# Patient Record
Sex: FEMALE | Race: WHITE | NOT HISPANIC OR LATINO | Employment: OTHER | URBAN - METROPOLITAN AREA
[De-identification: names, ages, dates, MRNs, and addresses within clinical notes are randomized per-mention and may not be internally consistent; named-entity substitution may affect disease eponyms.]

---

## 2017-01-18 ENCOUNTER — ALLSCRIPTS OFFICE VISIT (OUTPATIENT)
Dept: OTHER | Facility: OTHER | Age: 82
End: 2017-01-18

## 2017-09-11 ENCOUNTER — ALLSCRIPTS OFFICE VISIT (OUTPATIENT)
Dept: OTHER | Facility: OTHER | Age: 82
End: 2017-09-11

## 2017-09-11 DIAGNOSIS — E05.90 THYROTOXICOSIS WITHOUT THYROID STORM: ICD-10-CM

## 2017-09-11 DIAGNOSIS — E78.5 HYPERLIPIDEMIA: ICD-10-CM

## 2017-09-11 DIAGNOSIS — I10 ESSENTIAL (PRIMARY) HYPERTENSION: ICD-10-CM

## 2017-11-13 ENCOUNTER — ALLSCRIPTS OFFICE VISIT (OUTPATIENT)
Dept: OTHER | Facility: OTHER | Age: 82
End: 2017-11-13

## 2017-11-13 DIAGNOSIS — Z12.31 ENCOUNTER FOR SCREENING MAMMOGRAM FOR MALIGNANT NEOPLASM OF BREAST: ICD-10-CM

## 2018-01-13 VITALS
HEIGHT: 59 IN | SYSTOLIC BLOOD PRESSURE: 138 MMHG | BODY MASS INDEX: 20.99 KG/M2 | WEIGHT: 104.13 LBS | DIASTOLIC BLOOD PRESSURE: 70 MMHG

## 2018-01-14 VITALS
DIASTOLIC BLOOD PRESSURE: 70 MMHG | HEIGHT: 59 IN | SYSTOLIC BLOOD PRESSURE: 128 MMHG | RESPIRATION RATE: 16 BRPM | WEIGHT: 108 LBS | BODY MASS INDEX: 21.77 KG/M2 | HEART RATE: 74 BPM | TEMPERATURE: 98 F

## 2018-01-18 NOTE — PROGRESS NOTES
Assessment    1  Encounter for preventive health examination (V70 0) (Z00 00)    Plan  Encounter for screening mammogram for breast cancer    · * MAMMO SCREENING BILATERAL W CAD; Status:Hold For - Scheduling; Requested  for:13Nov2017;   PMH: History of polymyalgia rheumatica    · TraMADol HCl - 50 MG Oral Tablet; TAKE 1 TABLET 3 TIMES DAILY AS NEEDED    Discussion/Summary    Vitamin D 4000 units/day, tetanus shot at pharmacy  Impression: Initial Annual Wellness Visit, with preventive exam as well as age and risk appropriate counseling completed  Cardiovascular screening and counseling: screening is current  Diabetes screening and counseling: screening is current  Colorectal cancer screening and counseling: screening not indicated  Breast cancer screening and counseling: due for a screening mammogram    Cervical cancer screening and counseling: screening not indicated  Osteoporosis screening and counseling: screening not indicated  Abdominal aortic aneurysm screening and counseling: screening not indicated  Glaucoma screening and counseling: screening is current  HIV screening and counseling: screening not indicated  Immunizations: the patient declines the influenza vaccination, the lifetime pneumococcal vaccine has been completed, the risks and benefits of the Zostavax vaccine were discussed with the patient and the risks and benefits of the Tdap vaccine were discussed with the patient  Possible side effects of new medications were reviewed with the patient/guardian today  The treatment plan was reviewed with the patient/guardian  The patient/guardian understands and agrees with the treatment plan      Chief Complaint  Pt here for AWV      Advance Directives  Advance Directive [facility]:   YES - Patient has an advance health care directive     Durable Power of  For Healthcare:    Name: Aamda Fu   Relationship:    Alternate Health Care Proxy:    Name: Amada Fu   Relationship: son Capacity/Competence: This patient has full decision making capacity for discussion of advance care planning and This patient has full decision making competency for discussion of advance care planning  History of Present Illness  Welcome to Medicare and Wellness Visits: The patient is being seen for the initial annual wellness visit  Medicare Screening and Risk Factors   Hospitalizations: no previous hospitalizations and no hospitalization  Once per lifetime medicare screening tests: ECG has not been done and AAA screening US has not yet been done  Medicare Screening Tests Risk Questions   Abdominal aortic aneurysm risk assessment: none indicated  Osteoporosis risk assessment: , female gender and over 48years of age  HIV risk assessment: none indicated  Drug and Alcohol Use: The patient is a former cigarette smoker and quit smoking 30 years ago  The patient reports never drinking alcohol  Alcohol concern:   The patient has no concerns about alcohol abuse  She has never used illicit drugs  Diet and Physical Activity: Current diet includes well balanced meals  She exercises daily  Exercise: walking 30 minutes per day  Mood Disorder and Cognitive Impairment Screening: PHQ-9 Depression Scale   Over the past 2 weeks, how often have you been bothered by the following problems? 1 ) Little interest or pleasure in doing things? Not at all    2 ) Feeling down, depressed or hopeless? Not at all    3 ) Trouble falling asleep or sleeping too much? Not at all    4 ) Feeling tired or having little energy? Several days  5 ) Poor appetite or overeating? Not at all    6 ) Feeling bad about yourself, or that you are a failure, or have let yourself or your family down? Not at all    7 ) Trouble concentrating on things, such as reading a newspaper or watching television?  Not at all    8 ) Moving or speaking so slowly that other people could have noticed, or the opposite, moving or speaking faster than usual? Not at all  How difficult have these problems made it for you to do your work, take care of things at home, or get along with people? Not at all  Cognitive impairment screening: denies difficulty learning/retaining new information, denies difficulty handling complex tasks, denies difficulty with reasoning, denies difficulty with spatial ability and orientation, denies difficulty with language and denies difficulty with behavior  Functional Ability/Level of Safety: Hearing is normal bilaterally, normal in the right ear and normal in the left ear  She does not use a hearing aid  The patient is currently able to do activities of daily living with limitations, able to do instrumental activities of daily living with limitations, able to participate in social activities with limitations and unable to drive  Activities of daily living details: transportation help needed, needs help shopping, meal preparation help needed and needs help doing housework, but does not need help using the phone, does not need help doing laundry, does not need help managing medications and does not need help managing money  Fall risk factors: The patient fell 1 times in the past 12 months  tripped over walker  Injury History: previous fall  Home safety risk factors:  no unfamiliar surroundings, no loose rugs, no poor household lighting, no uneven floors, no household clutter, grab bars in the bathroom and handrails on the stairs  Advance Directives: Advance directives: living will, but no durable power of  for health care directives and no advance directives  Co-Managers and Medical Equipment/Suppliers: See Patient Care Team      Review of Systems    Constitutional: no malaise and no fatigue  Eyes: visual loss  ENT: no earache and no sore throat  Cardiovascular: no chest pain  Respiratory: no cough  Musculoskeletal: diffuse joint pain and back pain  Active Problems    1   Anxiety (300 00) (F41 9) 2  Arthritis (716 90) (M19 90)   3  Asymptomatic postmenopausal state (V49 81) (Z78 0)   4  Cancer (199 1) (C80 1)   5  Flu vaccine need (V04 81) (Z23)   6  Hyperlipidemia (272 4) (E78 5)   7  Hypertension (401 9) (I10)   8  Hyperthyroidism (242 90) (E05 90)   9  Insomnia (780 52) (G47 00)   10  Meningioma (225 2) (D32 9)   11  Need for Tdap vaccination (V06 1) (Z23)   12  Need for Zostavax administration (V04 89) (Z23)   13  Screening for genitourinary condition (V81 6) (Z13 89)    Past Medical History    · Denied: History of alcohol abuse   · Denied: History of mental problems   · Denied: History of substance abuse    The active problems and past medical history were reviewed and updated today  Surgical History    · History of Abdominal / Peritoneal Surgery   · History of Appendectomy   · History of Hysterectomy    The surgical history was reviewed and updated today  Family History  Father    · Family history of cardiac disorder (V17 49) (Z82 49)   · Family history of pulmonary embolism (V17 49) (Z82 49)  Sister    · Family history of dementia (V17 2) (Z81 8)  Other    · Family history of lung cancer (V16 1) (Z80 1)  Family History    · Denied: Family history of alcohol abuse   · Denied: Family history of mental disorder   · Denied: Family history of substance abuse    The family history was reviewed and updated today         Social History    · Active advance directive (V49 89) (Z78 9)   · Advance directive information unavailable   · Always uses seat belt   · Apartment   · lives in apartment as part of daughter's house   · Former smoker (I02 13) (Q40 357)   · Denied: History of domestic violence   ·    · No alcohol use   · Patient has living will (V49 89) (Z78 9)   · Power of  in existence   · Primary language is English   · Denied: History of Problem with [employer] beliefs regarding medical care   · Retired   · [employer]   · Smoking for 15 years   · Supportive and safe   · Two children  The social history was reviewed and updated today  Current Meds   1  Aspirin 81 MG TABS; Take 1 tablet daily Recorded   2  ClonazePAM 1 MG Oral Tablet; TAKE 1 TABLET 3 TIMES DAILY; Last Rx:03Oct2017   Ordered   3  Rozerem 8 MG Oral Tablet; TAKE 1 TABLET AT BEDTIME; Therapy: [id number] to (Evaluate:18Apr2017); Last Rx:18Jan2017 Ordered   4  TraMADol HCl - 50 MG Oral Tablet; TAKE 1 TABLET 3 TIMES DAILY AS NEEDED; Therapy: [id number] to (Evaluate:11Aug2017)  Requested for: 12Jun2017; Last   Rx:12Jun2017 Ordered   5  Triamterene-HCTZ 37 5-25 MG Oral Capsule; Take one capsule daily; Therapy: 16SXH9704 to (Evaluate:18May2017)  Requested for: 55RWG2710; Last   Rx:18Jan2017 Ordered   6  Triamterene-HCTZ 37 5-25 MG Oral Tablet; TAKE 1 TABLET DAILY; Therapy: 73YXT7546 to (Evaluate:27Jun2017)  Requested for: 12Jun2017; Last   Rx:12Jun2017 Ordered   7  Zetia 10 MG Oral Tablet; 1 po q day  Requested for: 21NVF1901; Last Rx:18Jan2017   Ordered    The medication list was reviewed and updated today  Allergies    1  No Known Drug Allergies    Immunizations   ** Printed in Appendix #1 below  Vitals  Signs    Temperature: 97 7 F, Temporal  Heart Rate: 72  Respiration: 14  Systolic: 363  Diastolic: 74  Height: 4 ft 11 in  Weight: 106 lb 4 oz  BMI Calculated: 21 46  BSA Calculated: 1 41    Physical Exam    Constitutional   General appearance: No acute distress, well appearing and well nourished  Ears, Nose, Mouth, and Throat   Otoscopic examination: Tympanic membranes translucent with normal light reflex  Canals patent without erythema  Oropharynx: Normal with no erythema, edema, exudate or lesions  Neck   Thyroid: Normal, no thyromegaly  Pulmonary   Auscultation of lungs: Clear to auscultation  Cardiovascular   Auscultation of heart: Normal rate and rhythm, normal S1 and S2, no murmurs  Abdomen   Abdomen: Non-tender, no masses      Lymphatic   Palpation of lymph nodes in neck: No lymphadenopathy  Musculoskeletal   Joints, bones, and muscles: Abnormal   Appearance - swelling  Palpation - lower mid-lumbar tenderness  Future Appointments    Date/Time Provider Specialty Site   2018 10:30 AM Xuan Horn MD [facility] [place] [address]     Signatures   Electronically signed by :  Conner Delgado MD; 2017 11:40AM EST                       (Author)    Appendix #1     Patient: Manuel Pedraza ; : 1933; MRN: [id number]      1 2 3 4    Influenza  Temporarily Deferred: Pt refuses, As of: [id number], Defer for 1 Years -2013  (08H) -2014  (15I) 2015  (79S)    Pneumo Other  Oct 2002  (68y) 26-Mar-2014  (80y)      Tdap  Temporarily Deferred: Pt refuses, NOT COVERED BY INSURANCE, As of: [id number], Defer for 2 Years       Zoster  Temporarily Deferred: Pt requests deferral, As of: [id number], Defer for 2 Years

## 2018-01-22 VITALS
TEMPERATURE: 97.7 F | RESPIRATION RATE: 14 BRPM | WEIGHT: 106.25 LBS | HEART RATE: 72 BPM | DIASTOLIC BLOOD PRESSURE: 74 MMHG | HEIGHT: 59 IN | BODY MASS INDEX: 21.42 KG/M2 | SYSTOLIC BLOOD PRESSURE: 122 MMHG

## 2018-02-19 ENCOUNTER — TELEPHONE (OUTPATIENT)
Dept: FAMILY MEDICINE CLINIC | Facility: CLINIC | Age: 83
End: 2018-02-19

## 2018-02-19 DIAGNOSIS — F41.9 ANXIETY: ICD-10-CM

## 2018-02-19 DIAGNOSIS — M19.90 ARTHRITIS: Primary | ICD-10-CM

## 2018-02-19 RX ORDER — TRAMADOL HYDROCHLORIDE 50 MG/1
1 TABLET ORAL 3 TIMES DAILY PRN
COMMUNITY
Start: 2014-07-08 | End: 2018-02-19 | Stop reason: SDUPTHER

## 2018-02-19 RX ORDER — EZETIMIBE 10 MG/1
TABLET ORAL DAILY
COMMUNITY
End: 2018-10-01 | Stop reason: SDUPTHER

## 2018-02-19 RX ORDER — CLONAZEPAM 1 MG/1
1 TABLET ORAL 3 TIMES DAILY
COMMUNITY
End: 2018-02-19 | Stop reason: SDUPTHER

## 2018-02-19 RX ORDER — TRIAMTERENE AND HYDROCHLOROTHIAZIDE 37.5; 25 MG/1; MG/1
1 CAPSULE ORAL DAILY
COMMUNITY
Start: 2014-08-11 | End: 2018-10-01 | Stop reason: SDUPTHER

## 2018-02-19 RX ORDER — TRAMADOL HYDROCHLORIDE 50 MG/1
50 TABLET ORAL 3 TIMES DAILY PRN
Qty: 90 TABLET | Refills: 0 | Status: SHIPPED | OUTPATIENT
Start: 2018-02-19 | End: 2018-04-11 | Stop reason: SDUPTHER

## 2018-02-19 RX ORDER — CLONAZEPAM 1 MG/1
1 TABLET ORAL 3 TIMES DAILY
Qty: 90 TABLET | Refills: 2 | Status: SHIPPED | OUTPATIENT
Start: 2018-02-19 | End: 2018-07-06 | Stop reason: SDUPTHER

## 2018-02-19 NOTE — TELEPHONE ENCOUNTER
Gina Ndiaye called in for wife  She needs her tramadol and her clonazepam  Only problem they do not have transportation to get to office  I will have to speak w/ rosana to see if we can call this in for patient(tramadol) and fax over clonazepam to [facility]  Call once sent or ready for

## 2018-02-19 NOTE — TELEPHONE ENCOUNTER
Justino Gore gave the okay this one time for patient's tramadol to be called into pharmacy and for clonazepam to be faxed to [facility]'s pharmacy  Please make patient aware that any future medications will need to be picked up  Thank you

## 2018-03-12 ENCOUNTER — TELEPHONE (OUTPATIENT)
Dept: FAMILY MEDICINE CLINIC | Facility: CLINIC | Age: 83
End: 2018-03-12

## 2018-03-12 NOTE — TELEPHONE ENCOUNTER
Wants to know when controlled substances can be sent to pharmacy and  feeling sick so cancelling appt-told her network working on e-prescribing of controlled substances

## 2018-04-11 DIAGNOSIS — M19.90 ARTHRITIS: ICD-10-CM

## 2018-04-11 RX ORDER — TRAMADOL HYDROCHLORIDE 50 MG/1
50 TABLET ORAL 3 TIMES DAILY PRN
Qty: 90 TABLET | Refills: 0 | Status: SHIPPED | OUTPATIENT
Start: 2018-04-11 | End: 2018-05-14 | Stop reason: SDUPTHER

## 2018-05-14 DIAGNOSIS — M19.90 ARTHRITIS: ICD-10-CM

## 2018-05-14 RX ORDER — TRAMADOL HYDROCHLORIDE 50 MG/1
50 TABLET ORAL 3 TIMES DAILY PRN
Qty: 90 TABLET | Refills: 0 | Status: SHIPPED | OUTPATIENT
Start: 2018-05-14 | End: 2018-06-14 | Stop reason: SDUPTHER

## 2018-05-24 ENCOUNTER — OFFICE VISIT (OUTPATIENT)
Dept: FAMILY MEDICINE CLINIC | Facility: CLINIC | Age: 83
End: 2018-05-24
Payer: COMMERCIAL

## 2018-05-24 VITALS
HEART RATE: 74 BPM | HEIGHT: 59 IN | RESPIRATION RATE: 14 BRPM | BODY MASS INDEX: 22.05 KG/M2 | WEIGHT: 109.38 LBS | SYSTOLIC BLOOD PRESSURE: 112 MMHG | DIASTOLIC BLOOD PRESSURE: 70 MMHG | TEMPERATURE: 98.3 F

## 2018-05-24 DIAGNOSIS — E78.5 HYPERLIPIDEMIA, UNSPECIFIED HYPERLIPIDEMIA TYPE: Primary | ICD-10-CM

## 2018-05-24 DIAGNOSIS — E05.90 HYPERTHYROIDISM: ICD-10-CM

## 2018-05-24 DIAGNOSIS — F41.9 ANXIETY: ICD-10-CM

## 2018-05-24 DIAGNOSIS — I10 ESSENTIAL HYPERTENSION: ICD-10-CM

## 2018-05-24 PROCEDURE — 99214 OFFICE O/P EST MOD 30 MIN: CPT | Performed by: FAMILY MEDICINE

## 2018-05-24 PROCEDURE — 1101F PT FALLS ASSESS-DOCD LE1/YR: CPT | Performed by: FAMILY MEDICINE

## 2018-05-24 PROCEDURE — 3725F SCREEN DEPRESSION PERFORMED: CPT | Performed by: FAMILY MEDICINE

## 2018-05-24 NOTE — PROGRESS NOTES
Assessment/Plan:    No problem-specific Assessment & Plan notes found for this encounter  Diagnoses and all orders for this visit:    Hyperlipidemia, unspecified hyperlipidemia type    Anxiety    Essential hypertension          Subjective:      Patient ID: Tramaine Velazquez is a 80 y o  female  Hypertension   This is a chronic problem  The current episode started more than 1 year ago  The problem is unchanged  The problem is controlled  Associated symptoms include anxiety  Pertinent negatives include no chest pain, headaches, palpitations, peripheral edema or shortness of breath  There are no associated agents to hypertension  Past treatments include diuretics  The current treatment provides significant improvement  There are no compliance problems  The following portions of the patient's history were reviewed and updated as appropriate: allergies, current medications, past family history, past medical history, past social history, past surgical history and problem list     Review of Systems   Constitutional: Negative for chills, fatigue and unexpected weight change  Respiratory: Negative for shortness of breath  Cardiovascular: Negative for chest pain and palpitations  Musculoskeletal: Positive for arthralgias  Neurological: Negative for headaches  Psychiatric/Behavioral: The patient is nervous/anxious  Objective:      /70 (BP Location: Left arm, Patient Position: Sitting, Cuff Size: Adult)   Pulse 74   Temp 98 3 °F (36 8 °C) (Temporal)   Resp 14   Ht 4' 11 01" (1 499 m)   Wt 49 6 kg (109 lb 6 oz)   BMI 22 08 kg/m²          Physical Exam   Constitutional: She appears well-developed and well-nourished  Neck: No thyromegaly present  Cardiovascular: Normal rate, regular rhythm and normal heart sounds  Pulmonary/Chest: Breath sounds normal    Musculoskeletal: She exhibits no edema  Lymphadenopathy:     She has no cervical adenopathy  Vitals reviewed

## 2018-06-14 DIAGNOSIS — M19.90 ARTHRITIS: ICD-10-CM

## 2018-06-14 RX ORDER — TRAMADOL HYDROCHLORIDE 50 MG/1
50 TABLET ORAL 3 TIMES DAILY PRN
Qty: 90 TABLET | Refills: 0 | Status: SHIPPED | OUTPATIENT
Start: 2018-06-14 | End: 2018-07-11 | Stop reason: SDUPTHER

## 2018-06-15 ENCOUNTER — TELEPHONE (OUTPATIENT)
Dept: FAMILY MEDICINE CLINIC | Facility: CLINIC | Age: 83
End: 2018-06-15

## 2018-06-22 ENCOUNTER — TELEPHONE (OUTPATIENT)
Dept: FAMILY MEDICINE CLINIC | Facility: CLINIC | Age: 83
End: 2018-06-22

## 2018-06-22 NOTE — TELEPHONE ENCOUNTER
Pt called in and asked If we can cancel her home draw blood work appointment due to her  being inside the hospital, I did call [facility] home draw and left a detail message to cancel appointment

## 2018-07-06 DIAGNOSIS — F41.9 ANXIETY: ICD-10-CM

## 2018-07-06 DIAGNOSIS — M19.90 ARTHRITIS: ICD-10-CM

## 2018-07-06 RX ORDER — CLONAZEPAM 1 MG/1
1 TABLET ORAL 3 TIMES DAILY
Qty: 90 TABLET | Refills: 1 | OUTPATIENT
Start: 2018-07-06

## 2018-07-06 RX ORDER — TRAMADOL HYDROCHLORIDE 50 MG/1
50 TABLET ORAL 3 TIMES DAILY PRN
Qty: 90 TABLET | Refills: 0 | OUTPATIENT
Start: 2018-07-06

## 2018-07-06 RX ORDER — CLONAZEPAM 1 MG/1
1 TABLET ORAL 3 TIMES DAILY
Qty: 90 TABLET | Refills: 2 | Status: SHIPPED | OUTPATIENT
Start: 2018-07-06 | End: 2018-11-27 | Stop reason: SDUPTHER

## 2018-07-06 NOTE — TELEPHONE ENCOUNTER
Pt forgot to mention she will be out of tramadol this weekend  Needs refill on tramadol 50mg tabs  pls send today  Thank you

## 2018-07-11 DIAGNOSIS — M19.90 ARTHRITIS: ICD-10-CM

## 2018-07-11 RX ORDER — TRAMADOL HYDROCHLORIDE 50 MG/1
50 TABLET ORAL 3 TIMES DAILY PRN
Qty: 90 TABLET | Refills: 0 | Status: SHIPPED | OUTPATIENT
Start: 2018-07-11 | End: 2018-08-15 | Stop reason: SDUPTHER

## 2018-08-15 DIAGNOSIS — M19.90 ARTHRITIS: ICD-10-CM

## 2018-08-15 RX ORDER — TRAMADOL HYDROCHLORIDE 50 MG/1
50 TABLET ORAL 3 TIMES DAILY PRN
Qty: 90 TABLET | Refills: 0 | Status: SHIPPED | OUTPATIENT
Start: 2018-08-15 | End: 2018-09-17 | Stop reason: SDUPTHER

## 2018-08-15 NOTE — TELEPHONE ENCOUNTER
Pt called requesting med refill for her tramadol 50mg to be sent to [facility]   Pt last seen 5/24/18, last bw 6/5/18

## 2018-09-17 DIAGNOSIS — M19.90 ARTHRITIS: ICD-10-CM

## 2018-09-17 RX ORDER — TRAMADOL HYDROCHLORIDE 50 MG/1
50 TABLET ORAL 3 TIMES DAILY PRN
Qty: 90 TABLET | Refills: 0 | Status: SHIPPED | OUTPATIENT
Start: 2018-09-17 | End: 2018-10-17 | Stop reason: SDUPTHER

## 2018-10-01 ENCOUNTER — TELEPHONE (OUTPATIENT)
Dept: FAMILY MEDICINE CLINIC | Facility: CLINIC | Age: 83
End: 2018-10-01

## 2018-10-01 ENCOUNTER — OFFICE VISIT (OUTPATIENT)
Dept: FAMILY MEDICINE CLINIC | Facility: CLINIC | Age: 83
End: 2018-10-01
Payer: COMMERCIAL

## 2018-10-01 VITALS
HEIGHT: 59 IN | SYSTOLIC BLOOD PRESSURE: 110 MMHG | HEART RATE: 56 BPM | BODY MASS INDEX: 20.72 KG/M2 | TEMPERATURE: 97.6 F | RESPIRATION RATE: 18 BRPM | WEIGHT: 102.8 LBS | DIASTOLIC BLOOD PRESSURE: 70 MMHG

## 2018-10-01 DIAGNOSIS — Z29.9 PREVENTIVE MEASURE: Primary | ICD-10-CM

## 2018-10-01 DIAGNOSIS — E05.90 HYPERTHYROIDISM: ICD-10-CM

## 2018-10-01 DIAGNOSIS — E78.5 HYPERLIPIDEMIA, UNSPECIFIED HYPERLIPIDEMIA TYPE: ICD-10-CM

## 2018-10-01 DIAGNOSIS — I10 ESSENTIAL HYPERTENSION: Primary | ICD-10-CM

## 2018-10-01 PROCEDURE — 1160F RVW MEDS BY RX/DR IN RCRD: CPT | Performed by: FAMILY MEDICINE

## 2018-10-01 PROCEDURE — 1036F TOBACCO NON-USER: CPT | Performed by: FAMILY MEDICINE

## 2018-10-01 PROCEDURE — 99214 OFFICE O/P EST MOD 30 MIN: CPT | Performed by: FAMILY MEDICINE

## 2018-10-01 PROCEDURE — 3078F DIAST BP <80 MM HG: CPT | Performed by: FAMILY MEDICINE

## 2018-10-01 PROCEDURE — 3074F SYST BP LT 130 MM HG: CPT | Performed by: FAMILY MEDICINE

## 2018-10-01 PROCEDURE — 4040F PNEUMOC VAC/ADMIN/RCVD: CPT | Performed by: FAMILY MEDICINE

## 2018-10-01 RX ORDER — TRIAMTERENE AND HYDROCHLOROTHIAZIDE 37.5; 25 MG/1; MG/1
1 CAPSULE ORAL DAILY
Qty: 90 CAPSULE | Refills: 1 | Status: SHIPPED | OUTPATIENT
Start: 2018-10-01 | End: 2020-08-19 | Stop reason: CLARIF

## 2018-10-01 RX ORDER — EZETIMIBE 10 MG/1
10 TABLET ORAL DAILY
Qty: 90 TABLET | Refills: 1 | Status: SHIPPED | OUTPATIENT
Start: 2018-10-01 | End: 2019-10-09 | Stop reason: CLARIF

## 2018-10-01 RX ORDER — OSELTAMIVIR PHOSPHATE 75 MG/1
75 CAPSULE ORAL DAILY
Qty: 5 CAPSULE | Refills: 0 | Status: SHIPPED | OUTPATIENT
Start: 2018-10-01 | End: 2018-10-06

## 2018-10-01 NOTE — TELEPHONE ENCOUNTER
It is for preventive treatment IT exposed to flu-should fill but NOT take unless has exposure-she and her  have problems with flu shot, same preventive treatment applies to him, DO NOT TAKe unless exposed

## 2018-10-01 NOTE — PROGRESS NOTES
Assessment/Plan:    Hypertension  bp stable    Hyperthyroidism  Await lab    Hyperlipidemia  Await lab       Diagnoses and all orders for this visit:    Essential hypertension    Hyperlipidemia, unspecified hyperlipidemia type    Hyperthyroidism          Subjective:      Patient ID: Sofie Pierce is a 80 y o  female  Hypertension   This is a chronic problem  The current episode started more than 1 year ago  The problem is unchanged  The problem is controlled  Pertinent negatives include no anxiety, blurred vision, chest pain, headaches, palpitations, peripheral edema or shortness of breath  There are no associated agents to hypertension  Risk factors for coronary artery disease include post-menopausal state and sedentary lifestyle  Past treatments include diuretics  The current treatment provides significant improvement  There are no compliance problems  Hyperlipidemia   Pertinent negatives include no chest pain or shortness of breath  The following portions of the patient's history were reviewed and updated as appropriate: allergies, current medications, past family history, past medical history, past social history, past surgical history and problem list       Review of Systems   Constitutional: Negative for activity change, appetite change, fatigue and unexpected weight change  Eyes: Negative for blurred vision  Respiratory: Negative for shortness of breath  Cardiovascular: Negative for chest pain and palpitations  Musculoskeletal: Positive for arthralgias  Neurological: Negative for headaches  Objective:      /70 (BP Location: Right arm, Patient Position: Sitting, Cuff Size: Adult)   Pulse 56   Temp 97 6 °F (36 4 °C) (Temporal)   Resp 18   Ht 4' 11 01" (1 499 m)   Wt 46 6 kg (102 lb 12 8 oz)   BMI 20 76 kg/m²          Physical Exam   Constitutional: She appears well-developed and well-nourished  Neck: No thyromegaly present     Cardiovascular: Normal rate, regular rhythm and normal heart sounds  Pulmonary/Chest: Breath sounds normal    Musculoskeletal: She exhibits no edema  Lymphadenopathy:     She has no cervical adenopathy  Vitals reviewed

## 2018-10-02 NOTE — TELEPHONE ENCOUNTER
Tamiflu is one per day for prevention if exposed and one twice per day for treatment-I wrote rx for one per day for prevention-pharmacist seems like he or she not quite sure, should fill as one per day as written please

## 2018-10-02 NOTE — TELEPHONE ENCOUNTER
One twice a day for 5 days if for treatment    If for prevention its one per day for 10 days per guidelines

## 2018-10-02 NOTE — TELEPHONE ENCOUNTER
As per [facility]él[facility] pharmacist concern lies on SIG  As per  of Tamiflu the preventive SIG is take 1 capsule daily for 10 days  Your SIG read take 1 capsule by PO for 5 days  Pls clarify

## 2018-10-03 NOTE — TELEPHONE ENCOUNTER
If new guidelines for prevention  Is 10 days instead of 5 days call pharmacy and change quantity to 10 for both pt and

## 2018-10-04 ENCOUNTER — TELEPHONE (OUTPATIENT)
Dept: FAMILY MEDICINE CLINIC | Facility: CLINIC | Age: 83
End: 2018-10-04

## 2018-10-04 DIAGNOSIS — R82.90 MALODOROUS URINE: Primary | ICD-10-CM

## 2018-10-04 RX ORDER — NITROFURANTOIN 25; 75 MG/1; MG/1
100 CAPSULE ORAL 2 TIMES DAILY
Qty: 14 CAPSULE | Refills: 0 | Status: SHIPPED | OUTPATIENT
Start: 2018-10-04 | End: 2018-10-11

## 2018-10-04 NOTE — TELEPHONE ENCOUNTER
Pt has strong odor to her urine and asked if you can send Rx for UTI to the pharmacy on file for her   She said she doesn't not have any pain associated with UTI complaint
Pt notify
rx sent
Clear

## 2018-10-17 DIAGNOSIS — M19.90 ARTHRITIS: ICD-10-CM

## 2018-10-17 RX ORDER — TRAMADOL HYDROCHLORIDE 50 MG/1
50 TABLET ORAL 3 TIMES DAILY PRN
Qty: 90 TABLET | Refills: 0 | Status: SHIPPED | OUTPATIENT
Start: 2018-10-17 | End: 2018-11-15 | Stop reason: SDUPTHER

## 2018-10-17 NOTE — TELEPHONE ENCOUNTER
Patient called in requesting medication refill on her Tramadol to be sent to [address]    Last ov was 10/1/2018

## 2018-11-02 ENCOUNTER — TELEPHONE (OUTPATIENT)
Dept: FAMILY MEDICINE CLINIC | Facility: CLINIC | Age: 83
End: 2018-11-02

## 2018-11-02 NOTE — TELEPHONE ENCOUNTER
Patient called in stating she was waiting for [facility] to come to her house to do her blood work  I called [facility] and left a voice mail for them to call me back  I told Delmer Botello I will call her back as soon as they return my call

## 2018-11-15 DIAGNOSIS — M19.90 ARTHRITIS: ICD-10-CM

## 2018-11-15 RX ORDER — TRAMADOL HYDROCHLORIDE 50 MG/1
50 TABLET ORAL 3 TIMES DAILY PRN
Qty: 90 TABLET | Refills: 0 | Status: SHIPPED | OUTPATIENT
Start: 2018-11-15 | End: 2018-12-15

## 2018-11-15 NOTE — TELEPHONE ENCOUNTER
Patient called in for refill   traMADol  50 mg tablet  30 days 90 qty  Sent to [facility] pharmacy on file  Last ov was 10/01/18

## 2018-11-27 DIAGNOSIS — F41.9 ANXIETY: ICD-10-CM

## 2018-11-27 RX ORDER — CLONAZEPAM 1 MG/1
1 TABLET ORAL 3 TIMES DAILY
Qty: 90 TABLET | Refills: 0 | Status: SHIPPED | OUTPATIENT
Start: 2018-11-27 | End: 2019-01-08 | Stop reason: SDUPTHER

## 2018-11-27 NOTE — TELEPHONE ENCOUNTER
Patient called in requesting medication refill on her Clonazepam to be sent to [facility]    Last ov was:10/1/2018

## 2018-12-17 ENCOUNTER — TELEPHONE (OUTPATIENT)
Dept: FAMILY MEDICINE CLINIC | Facility: CLINIC | Age: 83
End: 2018-12-17

## 2018-12-17 DIAGNOSIS — M19.90 ARTHRITIS: Primary | ICD-10-CM

## 2018-12-17 DIAGNOSIS — Z87.39 HX OF POLYMYALGIA RHEUMATICA: Primary | ICD-10-CM

## 2018-12-17 RX ORDER — TRAMADOL HYDROCHLORIDE 50 MG/1
50 TABLET ORAL EVERY 6 HOURS PRN
Qty: 90 TABLET | Refills: 0 | Status: SHIPPED | OUTPATIENT
Start: 2018-12-17 | End: 2019-01-21 | Stop reason: SDUPTHER

## 2018-12-17 RX ORDER — TRAMADOL HYDROCHLORIDE 50 MG/1
50 TABLET ORAL EVERY 6 HOURS PRN
Qty: 270 TABLET | Refills: 0 | Status: CANCELLED | OUTPATIENT
Start: 2018-12-17

## 2018-12-17 NOTE — TELEPHONE ENCOUNTER
As per [employer] pt takes tramadol HCl 50mg for hx of polymyalgia rheumatica take 1 tab 3x a day as needed 270 qty 90 days

## 2019-01-07 ENCOUNTER — TELEPHONE (OUTPATIENT)
Dept: FAMILY MEDICINE CLINIC | Facility: CLINIC | Age: 84
End: 2019-01-07

## 2019-01-07 NOTE — TELEPHONE ENCOUNTER
Needs refill on :  Klonopin 1 mg for a 30 day supply called into [facility] (she is aware she is calling in early)

## 2019-01-08 ENCOUNTER — TELEPHONE (OUTPATIENT)
Dept: FAMILY MEDICINE CLINIC | Facility: CLINIC | Age: 84
End: 2019-01-08

## 2019-01-08 DIAGNOSIS — F41.9 ANXIETY: ICD-10-CM

## 2019-01-08 RX ORDER — CLONAZEPAM 1 MG/1
1 TABLET ORAL 3 TIMES DAILY
Qty: 90 TABLET | Refills: 0 | Status: SHIPPED | OUTPATIENT
Start: 2019-01-08 | End: 2019-02-21 | Stop reason: SDUPTHER

## 2019-01-08 NOTE — TELEPHONE ENCOUNTER
Per Aristeo Haskins:      Needs refill on :  Klonopin 1 mg for a 30 day supply called into [facility] (she is aware she is calling in early)   Last ov was 10/1/2018

## 2019-01-10 DIAGNOSIS — M19.90 ARTHRITIS: ICD-10-CM

## 2019-01-10 DIAGNOSIS — F41.9 ANXIETY: ICD-10-CM

## 2019-01-10 RX ORDER — CLONAZEPAM 1 MG/1
1 TABLET ORAL 3 TIMES DAILY
Qty: 90 TABLET | Refills: 0 | OUTPATIENT
Start: 2019-01-10

## 2019-01-10 RX ORDER — TRAMADOL HYDROCHLORIDE 50 MG/1
50 TABLET ORAL EVERY 6 HOURS PRN
Qty: 90 TABLET | Refills: 0 | Status: CANCELLED | OUTPATIENT
Start: 2019-01-10

## 2019-01-11 NOTE — TELEPHONE ENCOUNTER
Pharmacy called back and is aware that Dr Tadeo Salines to continue with the patient taking clonazepam and tramadol

## 2019-01-21 ENCOUNTER — TELEPHONE (OUTPATIENT)
Dept: FAMILY MEDICINE CLINIC | Facility: CLINIC | Age: 84
End: 2019-01-21

## 2019-01-21 DIAGNOSIS — M19.90 ARTHRITIS: ICD-10-CM

## 2019-01-21 RX ORDER — TRAMADOL HYDROCHLORIDE 50 MG/1
50 TABLET ORAL EVERY 6 HOURS PRN
Qty: 90 TABLET | Refills: 0 | Status: SHIPPED | OUTPATIENT
Start: 2019-01-21 | End: 2019-02-21 | Stop reason: SDUPTHER

## 2019-01-21 NOTE — TELEPHONE ENCOUNTER
Ed from [address]  [facility] called stating patient is also taking Klonopin and Tramadol, her insurance considers a dangerous combination  Pharmacy wants to know if you are still okay prescribing together?

## 2019-02-21 DIAGNOSIS — M19.90 ARTHRITIS: ICD-10-CM

## 2019-02-21 DIAGNOSIS — F41.9 ANXIETY: ICD-10-CM

## 2019-02-21 RX ORDER — TRAMADOL HYDROCHLORIDE 50 MG/1
50 TABLET ORAL EVERY 6 HOURS PRN
Qty: 90 TABLET | Refills: 0 | Status: SHIPPED | OUTPATIENT
Start: 2019-02-21 | End: 2019-03-25 | Stop reason: SDUPTHER

## 2019-02-21 RX ORDER — CLONAZEPAM 1 MG/1
1 TABLET ORAL 3 TIMES DAILY
Qty: 90 TABLET | Refills: 0 | Status: SHIPPED | OUTPATIENT
Start: 2019-02-21 | End: 2019-04-03 | Stop reason: SDUPTHER

## 2019-03-25 ENCOUNTER — TELEPHONE (OUTPATIENT)
Dept: FAMILY MEDICINE CLINIC | Facility: CLINIC | Age: 84
End: 2019-03-25

## 2019-03-25 DIAGNOSIS — M19.90 ARTHRITIS: ICD-10-CM

## 2019-03-25 RX ORDER — TRAMADOL HYDROCHLORIDE 50 MG/1
50 TABLET ORAL EVERY 6 HOURS PRN
Qty: 90 TABLET | Refills: 0 | Status: SHIPPED | OUTPATIENT
Start: 2019-03-25 | End: 2019-04-24 | Stop reason: SDUPTHER

## 2019-03-25 NOTE — TELEPHONE ENCOUNTER
Patient called in requesting a refill on her Tramadol 50 mg to be sent to her [facility]    Last ov was:10/1/2018

## 2019-03-25 NOTE — TELEPHONE ENCOUNTER
Pharmacy called stating the received an e script for Tramdol for pt but insurance is denying  Insurance states pt is also taking Clonazepam  Insurance states those 2 medications are dangerous to take together and wants to know if provider prescribes these tow meds  Pharmacy would like you to call them back   At [phone]

## 2019-04-03 ENCOUNTER — OFFICE VISIT (OUTPATIENT)
Dept: FAMILY MEDICINE CLINIC | Facility: CLINIC | Age: 84
End: 2019-04-03
Payer: COMMERCIAL

## 2019-04-03 VITALS
WEIGHT: 103 LBS | HEART RATE: 64 BPM | RESPIRATION RATE: 17 BRPM | BODY MASS INDEX: 20.76 KG/M2 | DIASTOLIC BLOOD PRESSURE: 60 MMHG | HEIGHT: 59 IN | TEMPERATURE: 98.2 F | SYSTOLIC BLOOD PRESSURE: 138 MMHG

## 2019-04-03 DIAGNOSIS — E78.5 HYPERLIPIDEMIA, UNSPECIFIED HYPERLIPIDEMIA TYPE: ICD-10-CM

## 2019-04-03 DIAGNOSIS — I10 ESSENTIAL HYPERTENSION: ICD-10-CM

## 2019-04-03 DIAGNOSIS — Z00.00 MEDICARE ANNUAL WELLNESS VISIT, SUBSEQUENT: Primary | ICD-10-CM

## 2019-04-03 DIAGNOSIS — F41.9 ANXIETY: ICD-10-CM

## 2019-04-03 DIAGNOSIS — M19.90 ARTHRITIS: ICD-10-CM

## 2019-04-03 PROCEDURE — 1036F TOBACCO NON-USER: CPT | Performed by: FAMILY MEDICINE

## 2019-04-03 PROCEDURE — 1125F AMNT PAIN NOTED PAIN PRSNT: CPT | Performed by: FAMILY MEDICINE

## 2019-04-03 PROCEDURE — 1160F RVW MEDS BY RX/DR IN RCRD: CPT | Performed by: FAMILY MEDICINE

## 2019-04-03 PROCEDURE — G0439 PPPS, SUBSEQ VISIT: HCPCS | Performed by: FAMILY MEDICINE

## 2019-04-03 PROCEDURE — 1170F FXNL STATUS ASSESSED: CPT | Performed by: FAMILY MEDICINE

## 2019-04-03 PROCEDURE — 99214 OFFICE O/P EST MOD 30 MIN: CPT | Performed by: FAMILY MEDICINE

## 2019-04-03 RX ORDER — CLONAZEPAM 1 MG/1
1 TABLET ORAL 3 TIMES DAILY
Qty: 90 TABLET | Refills: 0 | Status: SHIPPED | OUTPATIENT
Start: 2019-04-03 | End: 2019-04-05 | Stop reason: SDUPTHER

## 2019-04-05 ENCOUNTER — TELEPHONE (OUTPATIENT)
Dept: FAMILY MEDICINE CLINIC | Facility: CLINIC | Age: 84
End: 2019-04-05

## 2019-04-05 DIAGNOSIS — F41.9 ANXIETY: ICD-10-CM

## 2019-04-05 RX ORDER — CLONAZEPAM 1 MG/1
1 TABLET ORAL 3 TIMES DAILY
Qty: 90 TABLET | Refills: 0 | Status: SHIPPED | OUTPATIENT
Start: 2019-04-05 | End: 2019-05-23 | Stop reason: SDUPTHER

## 2019-04-24 ENCOUNTER — TELEPHONE (OUTPATIENT)
Dept: FAMILY MEDICINE CLINIC | Facility: CLINIC | Age: 84
End: 2019-04-24

## 2019-04-24 DIAGNOSIS — M19.90 ARTHRITIS: ICD-10-CM

## 2019-04-24 RX ORDER — TRAMADOL HYDROCHLORIDE 50 MG/1
50 TABLET ORAL EVERY 6 HOURS PRN
Qty: 90 TABLET | Refills: 0 | Status: SHIPPED | OUTPATIENT
Start: 2019-04-24 | End: 2019-05-22 | Stop reason: SDUPTHER

## 2019-05-22 DIAGNOSIS — M19.90 ARTHRITIS: ICD-10-CM

## 2019-05-22 RX ORDER — TRAMADOL HYDROCHLORIDE 50 MG/1
50 TABLET ORAL EVERY 6 HOURS PRN
Qty: 90 TABLET | Refills: 0 | Status: SHIPPED | OUTPATIENT
Start: 2019-05-22 | End: 2019-06-25 | Stop reason: SDUPTHER

## 2019-05-23 DIAGNOSIS — F41.9 ANXIETY: ICD-10-CM

## 2019-05-23 RX ORDER — CLONAZEPAM 1 MG/1
1 TABLET ORAL 3 TIMES DAILY
Qty: 90 TABLET | Refills: 0 | Status: SHIPPED | OUTPATIENT
Start: 2019-05-23 | End: 2019-06-25 | Stop reason: SDUPTHER

## 2019-05-24 ENCOUNTER — TELEPHONE (OUTPATIENT)
Dept: FAMILY MEDICINE CLINIC | Facility: CLINIC | Age: 84
End: 2019-05-24

## 2019-06-25 DIAGNOSIS — F41.9 ANXIETY: ICD-10-CM

## 2019-06-25 DIAGNOSIS — M19.90 ARTHRITIS: ICD-10-CM

## 2019-06-25 RX ORDER — CLONAZEPAM 1 MG/1
1 TABLET ORAL 3 TIMES DAILY
Qty: 90 TABLET | Refills: 0 | Status: SHIPPED | OUTPATIENT
Start: 2019-06-25 | End: 2019-07-25 | Stop reason: SDUPTHER

## 2019-06-25 RX ORDER — TRAMADOL HYDROCHLORIDE 50 MG/1
50 TABLET ORAL EVERY 6 HOURS PRN
Qty: 90 TABLET | Refills: 0 | Status: SHIPPED | OUTPATIENT
Start: 2019-06-25 | End: 2019-07-25 | Stop reason: SDUPTHER

## 2019-06-26 ENCOUNTER — TELEPHONE (OUTPATIENT)
Dept: FAMILY MEDICINE CLINIC | Facility: CLINIC | Age: 84
End: 2019-06-26

## 2019-07-25 DIAGNOSIS — F41.9 ANXIETY: ICD-10-CM

## 2019-07-25 DIAGNOSIS — M19.90 ARTHRITIS: ICD-10-CM

## 2019-07-25 RX ORDER — CLONAZEPAM 1 MG/1
1 TABLET ORAL 3 TIMES DAILY
Qty: 90 TABLET | Refills: 0 | Status: SHIPPED | OUTPATIENT
Start: 2019-07-25 | End: 2019-08-27 | Stop reason: SDUPTHER

## 2019-07-25 RX ORDER — TRAMADOL HYDROCHLORIDE 50 MG/1
50 TABLET ORAL EVERY 6 HOURS PRN
Qty: 90 TABLET | Refills: 0 | Status: SHIPPED | OUTPATIENT
Start: 2019-07-25 | End: 2019-08-27 | Stop reason: SDUPTHER

## 2019-08-27 ENCOUNTER — TELEPHONE (OUTPATIENT)
Dept: FAMILY MEDICINE CLINIC | Facility: CLINIC | Age: 84
End: 2019-08-27

## 2019-08-27 DIAGNOSIS — M19.90 ARTHRITIS: ICD-10-CM

## 2019-08-27 DIAGNOSIS — F41.9 ANXIETY: ICD-10-CM

## 2019-08-27 RX ORDER — TRAMADOL HYDROCHLORIDE 50 MG/1
50 TABLET ORAL EVERY 6 HOURS PRN
Qty: 90 TABLET | Refills: 0 | Status: SHIPPED | OUTPATIENT
Start: 2019-08-27 | End: 2019-09-25 | Stop reason: SDUPTHER

## 2019-08-27 RX ORDER — CLONAZEPAM 1 MG/1
1 TABLET ORAL 3 TIMES DAILY
Qty: 90 TABLET | Refills: 0 | Status: SHIPPED | OUTPATIENT
Start: 2019-08-27 | End: 2019-09-25 | Stop reason: SDUPTHER

## 2019-08-27 NOTE — TELEPHONE ENCOUNTER
Ed from [employer] called regarding insurance feels that there could be an interaction with patient's Tramadol and Clonazepam  Please verify with physician that she can have both and call pharmacy

## 2019-08-27 NOTE — TELEPHONE ENCOUNTER
MF, Pt states she has a strong odor to her urine and would like to something called into pharmacy, Can we call something in or does pt need an appt?

## 2019-08-28 ENCOUNTER — TELEPHONE (OUTPATIENT)
Dept: FAMILY MEDICINE CLINIC | Facility: CLINIC | Age: 84
End: 2019-08-28

## 2019-08-28 NOTE — TELEPHONE ENCOUNTER
Patient called back and stated that nothing was called in for her UTI  Can something be called into [facility]

## 2019-08-29 ENCOUNTER — TELEPHONE (OUTPATIENT)
Dept: FAMILY MEDICINE CLINIC | Facility: CLINIC | Age: 84
End: 2019-08-29

## 2019-08-29 NOTE — TELEPHONE ENCOUNTER
MF, Pt called the other day and states she has a UTI and would like you to call something into the pharmacy for her, Can we do this or does pt need an appt? SEE OTHER TASK

## 2019-08-30 ENCOUNTER — OFFICE VISIT (OUTPATIENT)
Dept: URGENT CARE | Facility: CLINIC | Age: 84
End: 2019-08-30
Payer: COMMERCIAL

## 2019-08-30 VITALS
SYSTOLIC BLOOD PRESSURE: 138 MMHG | OXYGEN SATURATION: 97 % | DIASTOLIC BLOOD PRESSURE: 76 MMHG | TEMPERATURE: 97.6 F | RESPIRATION RATE: 18 BRPM | HEART RATE: 63 BPM | WEIGHT: 105 LBS | BODY MASS INDEX: 21.21 KG/M2

## 2019-08-30 DIAGNOSIS — N39.0 URINARY TRACT INFECTION WITHOUT HEMATURIA, SITE UNSPECIFIED: Primary | ICD-10-CM

## 2019-08-30 DIAGNOSIS — R35.0 URINARY FREQUENCY: ICD-10-CM

## 2019-08-30 LAB
SL AMB  POCT GLUCOSE, UA: NEGATIVE
SL AMB LEUKOCYTE ESTERASE,UA: ABNORMAL
SL AMB POCT BILIRUBIN,UA: NEGATIVE
SL AMB POCT BLOOD,UA: ABNORMAL
SL AMB POCT CLARITY,UA: ABNORMAL
SL AMB POCT COLOR,UA: ABNORMAL
SL AMB POCT KETONES,UA: NEGATIVE
SL AMB POCT NITRITE,UA: ABNORMAL
SL AMB POCT PH,UA: 5
SL AMB POCT SPECIFIC GRAVITY,UA: 1.01
SL AMB POCT URINE PROTEIN: 30
SL AMB POCT UROBILINOGEN: 0.2

## 2019-08-30 PROCEDURE — 87186 SC STD MICRODIL/AGAR DIL: CPT | Performed by: PHYSICIAN ASSISTANT

## 2019-08-30 PROCEDURE — 87077 CULTURE AEROBIC IDENTIFY: CPT | Performed by: PHYSICIAN ASSISTANT

## 2019-08-30 PROCEDURE — 99203 OFFICE O/P NEW LOW 30 MIN: CPT | Performed by: PHYSICIAN ASSISTANT

## 2019-08-30 PROCEDURE — 87086 URINE CULTURE/COLONY COUNT: CPT | Performed by: PHYSICIAN ASSISTANT

## 2019-08-30 RX ORDER — SULFAMETHOXAZOLE AND TRIMETHOPRIM 800; 160 MG/1; MG/1
1 TABLET ORAL 2 TIMES DAILY
Qty: 14 TABLET | Refills: 0 | Status: SHIPPED | OUTPATIENT
Start: 2019-08-30 | End: 2019-09-06

## 2019-08-30 NOTE — PATIENT INSTRUCTIONS
Urinary Tract Infection in Women   AMBULATORY CARE:   A urinary tract infection (UTI)  is caused by bacteria that get inside your urinary tract  Most bacteria that enter your urinary tract come out when you urinate  If the bacteria stay in your urinary tract, you may get an infection  Your urinary tract includes your kidneys, ureters, bladder, and urethra  Urine is made in your kidneys, and it flows from the ureters to the bladder  Urine leaves the bladder through the urethra  A UTI is more common in your lower urinary tract, which includes your bladder and urethra  Common symptoms include the following:   · Urinating more often or waking from sleep to urinate    · Pain or burning when you urinate    · Pain or pressure in your lower abdomen     · Urine that smells bad    · Blood in your urine    · Leaking urine  Seek care immediately if:   · You are urinating very little or not at all  · You have a high fever with shaking chills  · You have side or back pain that gets worse  Contact your healthcare provider if:   · You have a fever  · You do not feel better after 2 days of taking antibiotics  · You are vomiting  · You have questions or concerns about your condition or care  Treatment for a UTI  may include medicines to treat a bacterial infection  You may also need medicines to decrease pain and burning, or decrease the urge to urinate often  Prevent a UTI:   · Empty your bladder often  Urinate and empty your bladder as soon as you feel the need  Do not hold your urine for long periods of time  · Wipe from front to back after you urinate or have a bowel movement  This will help prevent germs from getting into your urinary tract through your urethra  · Drink liquids as directed  Ask how much liquid to drink each day and which liquids are best for you  You may need to drink more liquids than usual to help flush out the bacteria  Do not drink alcohol, caffeine, or citrus juices  These can irritate your bladder and increase your symptoms  Your healthcare provider may recommend cranberry juice to help prevent a UTI  · Urinate after you have sex  This can help flush out bacteria passed during sex  · Do not douche or use feminine deodorants  These can change the chemical balance in your vagina  · Change sanitary pads or tampons often  This will help prevent germs from getting into your urinary tract  · Do pelvic muscle exercises often  Pelvic muscle exercises may help you start and stop urinating  Strong pelvic muscles may help you empty your bladder easier  Squeeze these muscles tightly for 5 seconds like you are trying to hold back urine  Then relax for 5 seconds  Gradually work up to squeezing for 10 seconds  Do 3 sets of 15 repetitions a day, or as directed  Follow up with your healthcare provider as directed:  Write down your questions so you remember to ask them during your visits  © 2017 [employer] Information is for End User's use only and may not be sold, redistributed or otherwise used for commercial purposes  All illustrations and images included in CareNotes® are the copyrighted property of [employer]  or Jaden Wong  The above information is an  only  It is not intended as medical advice for individual conditions or treatments  Talk to your doctor, nurse or pharmacist before following any medical regimen to see if it is safe and effective for you

## 2019-08-30 NOTE — PROGRESS NOTES
[facility]        NAME: Amee Gamez is a 80 y o  female  : 1933    MRN: [id number]  DATE: 2019  TIME: 11:50 AM    Assessment and Plan   Urinary tract infection without hematuria, site unspecified [N39 0]  1  Urinary tract infection without hematuria, site unspecified  sulfamethoxazole-trimethoprim (BACTRIM DS) 800-160 mg per tablet   2  Urinary frequency  POCT urine dip auto non-scope    Urine culture         Patient Instructions     Start Bactrim as directed  Drink plenty of fluids  Follow up with PCP in 3-5 days  Proceed to  ER if symptoms worsen  Chief Complaint     Chief Complaint   Patient presents with    Urinary Frequency     since monday          History of Present Illness       Patient presents with urinary frequency past 4 days  She states the urine has an odor but denies any fever chills abdominal pain flank pain  Last urinary tract infection was approximately 2 years ago  Review of Systems   Review of Systems   Constitutional: Negative for chills and fever  Gastrointestinal: Negative for nausea and vomiting  Genitourinary: Positive for frequency  Negative for difficulty urinating, dysuria, flank pain and hematuria  Skin: Negative for rash  Neurological: Negative for light-headedness  Hematological: Negative for adenopathy           Current Medications       Current Outpatient Medications:     aspirin 81 MG tablet, Take 1 tablet by mouth daily, Disp: , Rfl:     clonazePAM (KlonoPIN) 1 mg tablet, Take 1 tablet (1 mg total) by mouth 3 (three) times a day, Disp: 90 tablet, Rfl: 0    ezetimibe (ZETIA) 10 mg tablet, Take 1 tablet (10 mg total) by mouth daily, Disp: 90 tablet, Rfl: 1    sulfamethoxazole-trimethoprim (BACTRIM DS) 800-160 mg per tablet, Take 1 tablet by mouth 2 (two) times a day for 7 days, Disp: 14 tablet, Rfl: 0    traMADol (ULTRAM) 50 mg tablet, Take 1 tablet (50 mg total) by mouth every 6 (six) hours as needed for moderate pain, Disp: 90 tablet, Rfl: 0    triamterene-hydrochlorothiazide (DYAZIDE) 37 5-25 mg per capsule, Take 1 capsule by mouth daily, Disp: 90 capsule, Rfl: 1    Current Allergies     Allergies as of 08/30/2019    (No Known Allergies)            The following portions of the patient's history were reviewed and updated as appropriate: allergies, current medications, past family history, past medical history, past social history, past surgical history and problem list      Past Medical History:   Diagnosis Date    Anxiety     Arthritis     Hyperlipidemia     Hypertension     Hyperthyroidism        Past Surgical History:   Procedure Laterality Date    ABDOMINAL SURGERY      peritoneal     APPENDECTOMY      HYSTERECTOMY         Family History   Problem Relation Age of Onset    Heart disease Father     Pulmonary embolism Father     Dementia Sister     Lung cancer Other     Diabetes Mother          Medications have been verified  Objective   /76   Pulse 63   Temp 97 6 °F (36 4 °C)   Resp 18   Wt 47 6 kg (105 lb)   SpO2 97%   BMI 21 21 kg/m²        Physical Exam     Physical Exam   Constitutional: She is oriented to person, place, and time  She appears well-developed and well-nourished  HENT:   Head: Normocephalic and atraumatic  Neck: Normal range of motion  Cardiovascular: Normal rate and regular rhythm  Pulmonary/Chest: Effort normal    Neurological: She is alert and oriented to person, place, and time  Skin: Skin is warm and dry  Psychiatric: She has a normal mood and affect  Her behavior is normal    Nursing note and vitals reviewed

## 2019-08-30 NOTE — TELEPHONE ENCOUNTER
MF, I spoke to pt and she refuses to make an appt or go to an Urgent Care but I did make pt aware how dangerous this could be the elderly if she does have a UTI

## 2019-09-01 LAB — BACTERIA UR CULT: ABNORMAL

## 2019-09-25 DIAGNOSIS — M19.90 ARTHRITIS: ICD-10-CM

## 2019-09-25 DIAGNOSIS — F41.9 ANXIETY: ICD-10-CM

## 2019-09-27 RX ORDER — TRAMADOL HYDROCHLORIDE 50 MG/1
50 TABLET ORAL EVERY 6 HOURS PRN
Qty: 90 TABLET | Refills: 0 | Status: SHIPPED | OUTPATIENT
Start: 2019-09-27 | End: 2019-11-12 | Stop reason: SDUPTHER

## 2019-09-27 RX ORDER — CLONAZEPAM 1 MG/1
1 TABLET ORAL 2 TIMES DAILY
Qty: 60 TABLET | Refills: 0 | Status: SHIPPED | OUTPATIENT
Start: 2019-09-27 | End: 2019-10-30 | Stop reason: SDUPTHER

## 2019-10-09 ENCOUNTER — OFFICE VISIT (OUTPATIENT)
Dept: FAMILY MEDICINE CLINIC | Facility: CLINIC | Age: 84
End: 2019-10-09
Payer: COMMERCIAL

## 2019-10-09 VITALS
HEART RATE: 56 BPM | SYSTOLIC BLOOD PRESSURE: 116 MMHG | DIASTOLIC BLOOD PRESSURE: 62 MMHG | RESPIRATION RATE: 16 BRPM | TEMPERATURE: 98.1 F

## 2019-10-09 DIAGNOSIS — I10 ESSENTIAL HYPERTENSION: ICD-10-CM

## 2019-10-09 DIAGNOSIS — E78.5 HYPERLIPIDEMIA, UNSPECIFIED HYPERLIPIDEMIA TYPE: ICD-10-CM

## 2019-10-09 DIAGNOSIS — M19.90 ARTHRITIS: ICD-10-CM

## 2019-10-09 DIAGNOSIS — F41.9 ANXIETY: Primary | ICD-10-CM

## 2019-10-09 PROCEDURE — 99214 OFFICE O/P EST MOD 30 MIN: CPT | Performed by: FAMILY MEDICINE

## 2019-10-09 RX ORDER — BUSPIRONE HYDROCHLORIDE 5 MG/1
5 TABLET ORAL 2 TIMES DAILY
Qty: 60 TABLET | Refills: 5 | Status: SHIPPED | OUTPATIENT
Start: 2019-10-09 | End: 2020-04-30 | Stop reason: SDUPTHER

## 2019-10-09 NOTE — PATIENT INSTRUCTIONS
rec fish oil 3000 mg/day-keep in frig, change to buspar and make Klonopin as needed, rec tetanus shot at pharmacy

## 2019-10-09 NOTE — PROGRESS NOTES
Assessment and Plan:    Problem List Items Addressed This Visit        Cardiovascular and Mediastinum    Hypertension     BP stable            Musculoskeletal and Integument    Arthritis     Taking 3 tramadol per day for arthritis, could increase baby aspirin to 1 twice /day, suggested to decrease to 1 tramadol in am, 1/2 at lunch, 1 at bedtiem            Other    Anxiety - Primary     Will change to low dose buspar and then take Klonopin  Only as needed         Relevant Medications    busPIRone (BUSPAR) 5 mg tablet    Hyperlipidemia     rec fish oil-this should help joints also                      Diagnoses and all orders for this visit:    Anxiety  -     busPIRone (BUSPAR) 5 mg tablet; Take 1 tablet (5 mg total) by mouth 2 (two) times a day    Hyperlipidemia, unspecified hyperlipidemia type    Essential hypertension    Arthritis              Subjective:      Patient ID: Riya Mendez is a 80 y o  female  CC:    Chief Complaint   Patient presents with    Follow-up     Patietnt present today for her Routine follow up  Patient does not have any concerns at this time  HPI:    F/u anxiety , HTN and chronic pain-now ankles hurting, stopped cholesterol med after        The following portions of the patient's history were reviewed and updated as appropriate: allergies, current medications, past family history, past medical history, past social history, past surgical history and problem list       Review of Systems   Constitutional: Negative for activity change, appetite change and unexpected weight change  Respiratory: Negative for shortness of breath  Cardiovascular: Negative for chest pain  Neurological: Negative for dizziness and headaches  Psychiatric/Behavioral: The patient is nervous/anxious            Data to review:       Objective:    Vitals:    10/09/19 1026   BP: 116/62   BP Location: Right arm   Patient Position: Sitting   Cuff Size: Standard   Pulse: 56   Resp: 16   Temp: 98 1 °F (36 7 °C)   TempSrc: Temporal        Physical Exam   Constitutional: She appears well-developed and well-nourished  Neck: No thyromegaly present  Cardiovascular: Normal rate, regular rhythm and normal heart sounds  Pulmonary/Chest: Effort normal and breath sounds normal    Musculoskeletal: She exhibits tenderness  She exhibits no edema  Ankles sore to touch   Lymphadenopathy:     She has no cervical adenopathy  Vitals reviewed

## 2019-10-09 NOTE — ASSESSMENT & PLAN NOTE
Taking 3 tramadol per day for arthritis, could increase baby aspirin to 1 twice /day, suggested to decrease to 1 tramadol in am, 1/2 at lunch, 1 at bedtiem

## 2019-10-30 DIAGNOSIS — F41.9 ANXIETY: ICD-10-CM

## 2019-10-30 RX ORDER — CLONAZEPAM 1 MG/1
1 TABLET ORAL 2 TIMES DAILY
Qty: 60 TABLET | Refills: 2 | Status: SHIPPED | OUTPATIENT
Start: 2019-10-30 | End: 2019-12-03 | Stop reason: SDUPTHER

## 2019-11-08 DIAGNOSIS — M19.90 ARTHRITIS: ICD-10-CM

## 2019-11-08 RX ORDER — TRAMADOL HYDROCHLORIDE 50 MG/1
50 TABLET ORAL EVERY 6 HOURS PRN
Qty: 90 TABLET | Refills: 0 | Status: CANCELLED | OUTPATIENT
Start: 2019-11-08

## 2019-11-12 DIAGNOSIS — M19.90 ARTHRITIS: ICD-10-CM

## 2019-11-12 RX ORDER — TRAMADOL HYDROCHLORIDE 50 MG/1
50 TABLET ORAL EVERY 6 HOURS PRN
Qty: 90 TABLET | Refills: 0 | Status: SHIPPED | OUTPATIENT
Start: 2019-11-12 | End: 2019-12-20 | Stop reason: SDUPTHER

## 2019-12-02 ENCOUNTER — TELEPHONE (OUTPATIENT)
Dept: FAMILY MEDICINE CLINIC | Facility: CLINIC | Age: 84
End: 2019-12-02

## 2019-12-02 NOTE — TELEPHONE ENCOUNTER
PT IS CALLING IN FOR A REFILL ON HER  LORAZEPAM, HER "PAIN PILL"  PHARM[facility]Y IS [facility] [place], PHONE NUMBER IS  [phone]  THANK YOU

## 2019-12-03 ENCOUNTER — TELEPHONE (OUTPATIENT)
Dept: FAMILY MEDICINE CLINIC | Facility: CLINIC | Age: 84
End: 2019-12-03

## 2019-12-03 DIAGNOSIS — F41.9 ANXIETY: ICD-10-CM

## 2019-12-03 RX ORDER — CLONAZEPAM 1 MG/1
TABLET ORAL
Qty: 45 TABLET | Refills: 2 | Status: SHIPPED | OUTPATIENT
Start: 2019-12-03 | End: 2020-02-27 | Stop reason: SDUPTHER

## 2019-12-03 NOTE — TELEPHONE ENCOUNTER
Pharmacy called stating that when they put pts Rx through insurance it comes up Clonazepam Denied due to pt also taking Tramadol, Pharmacy states this risky for pt to take both and they were just making you aware of this, Do you still want to prescribe this?

## 2019-12-03 NOTE — TELEPHONE ENCOUNTER
Pt takes clonazepam not lorazepam and is not due for pain pill until next Wednesday at earliest-how many tramadol does she have left?

## 2019-12-20 DIAGNOSIS — M19.90 ARTHRITIS: ICD-10-CM

## 2019-12-20 RX ORDER — TRAMADOL HYDROCHLORIDE 50 MG/1
50 TABLET ORAL EVERY 6 HOURS PRN
Qty: 90 TABLET | Refills: 0 | Status: SHIPPED | OUTPATIENT
Start: 2019-12-20 | End: 2020-01-31 | Stop reason: SDUPTHER

## 2019-12-20 NOTE — TELEPHONE ENCOUNTER
11/12/2019  2  11/12/2019  TRAMADOL HCL 50 MG TABLET  90 0  22  MA SHERRY  [id number]  [facility] ([id number])  0  20 45 MME  [place]  [place]

## 2020-01-31 DIAGNOSIS — M19.90 ARTHRITIS: ICD-10-CM

## 2020-01-31 RX ORDER — TRAMADOL HYDROCHLORIDE 50 MG/1
50 TABLET ORAL EVERY 6 HOURS PRN
Qty: 90 TABLET | Refills: 0 | Status: SHIPPED | OUTPATIENT
Start: 2020-01-31 | End: 2020-03-19 | Stop reason: SDUPTHER

## 2020-02-04 DIAGNOSIS — M19.90 ARTHRITIS: ICD-10-CM

## 2020-02-04 RX ORDER — TRAMADOL HYDROCHLORIDE 50 MG/1
50 TABLET ORAL EVERY 6 HOURS PRN
Qty: 90 TABLET | Refills: 0 | OUTPATIENT
Start: 2020-02-04

## 2020-02-05 ENCOUNTER — TELEPHONE (OUTPATIENT)
Dept: FAMILY MEDICINE CLINIC | Facility: CLINIC | Age: 85
End: 2020-02-05

## 2020-02-05 NOTE — TELEPHONE ENCOUNTER
Yes I know she is taking both and has done so for quite some time-am working on  Weaning pt once nancy rnicer

## 2020-02-05 NOTE — TELEPHONE ENCOUNTER
[facility] calling in regards to patients medication  States that insurance would like to verify with Dr Dori Gaxiola that patient is suppose to be taking tramadol and klonopin   Please clarify and call pharmacy

## 2020-02-27 DIAGNOSIS — F41.9 ANXIETY: ICD-10-CM

## 2020-02-27 RX ORDER — CLONAZEPAM 1 MG/1
TABLET ORAL
Qty: 45 TABLET | Refills: 2 | Status: SHIPPED | OUTPATIENT
Start: 2020-02-27 | End: 2020-05-21 | Stop reason: SDUPTHER

## 2020-02-28 ENCOUNTER — TELEPHONE (OUTPATIENT)
Dept: FAMILY MEDICINE CLINIC | Facility: CLINIC | Age: 85
End: 2020-02-28

## 2020-02-28 NOTE — TELEPHONE ENCOUNTER
MF, Pharmacy states that there is a risk between Tramadol and Clonazepam, Pharmacy states that the 2 meds together increases the risk of overdose, Do you still want pt to take both?

## 2020-03-19 DIAGNOSIS — M19.90 ARTHRITIS: ICD-10-CM

## 2020-03-19 RX ORDER — TRAMADOL HYDROCHLORIDE 50 MG/1
50 TABLET ORAL EVERY 6 HOURS PRN
Qty: 90 TABLET | Refills: 0 | Status: SHIPPED | OUTPATIENT
Start: 2020-03-19 | End: 2020-05-05 | Stop reason: SDUPTHER

## 2020-04-02 DIAGNOSIS — R30.0 DYSURIA: Primary | ICD-10-CM

## 2020-04-09 ENCOUNTER — TELEMEDICINE (OUTPATIENT)
Dept: FAMILY MEDICINE CLINIC | Facility: CLINIC | Age: 85
End: 2020-04-09
Payer: COMMERCIAL

## 2020-04-09 DIAGNOSIS — I10 ESSENTIAL HYPERTENSION: ICD-10-CM

## 2020-04-09 DIAGNOSIS — M19.90 ARTHRITIS: ICD-10-CM

## 2020-04-09 DIAGNOSIS — R35.0 URINARY FREQUENCY: Primary | ICD-10-CM

## 2020-04-09 PROCEDURE — 99213 OFFICE O/P EST LOW 20 MIN: CPT | Performed by: FAMILY MEDICINE

## 2020-04-09 PROCEDURE — 3288F FALL RISK ASSESSMENT DOCD: CPT | Performed by: FAMILY MEDICINE

## 2020-04-09 PROCEDURE — 1101F PT FALLS ASSESS-DOCD LE1/YR: CPT | Performed by: FAMILY MEDICINE

## 2020-04-09 PROCEDURE — 1160F RVW MEDS BY RX/DR IN RCRD: CPT | Performed by: FAMILY MEDICINE

## 2020-04-09 RX ORDER — NITROFURANTOIN 25; 75 MG/1; MG/1
100 CAPSULE ORAL 2 TIMES DAILY
Qty: 10 CAPSULE | Refills: 0 | Status: SHIPPED | OUTPATIENT
Start: 2020-04-09 | End: 2020-04-14

## 2020-04-30 DIAGNOSIS — F41.9 ANXIETY: ICD-10-CM

## 2020-04-30 RX ORDER — BUSPIRONE HYDROCHLORIDE 5 MG/1
5 TABLET ORAL 2 TIMES DAILY
Qty: 60 TABLET | Refills: 5 | Status: SHIPPED | OUTPATIENT
Start: 2020-04-30 | End: 2020-10-30 | Stop reason: SDUPTHER

## 2020-05-05 ENCOUNTER — TELEPHONE (OUTPATIENT)
Dept: FAMILY MEDICINE CLINIC | Facility: CLINIC | Age: 85
End: 2020-05-05

## 2020-05-05 DIAGNOSIS — M19.90 ARTHRITIS: ICD-10-CM

## 2020-05-05 RX ORDER — TRAMADOL HYDROCHLORIDE 50 MG/1
50 TABLET ORAL EVERY 6 HOURS PRN
Qty: 90 TABLET | Refills: 0 | Status: SHIPPED | OUTPATIENT
Start: 2020-05-05 | End: 2020-06-17 | Stop reason: SDUPTHER

## 2020-05-21 DIAGNOSIS — F41.9 ANXIETY: ICD-10-CM

## 2020-05-21 RX ORDER — CLONAZEPAM 1 MG/1
TABLET ORAL
Qty: 45 TABLET | Refills: 2 | Status: SHIPPED | OUTPATIENT
Start: 2020-05-21 | End: 2020-08-17 | Stop reason: SDUPTHER

## 2020-06-17 DIAGNOSIS — M19.90 ARTHRITIS: ICD-10-CM

## 2020-06-18 RX ORDER — TRAMADOL HYDROCHLORIDE 50 MG/1
50 TABLET ORAL EVERY 6 HOURS PRN
Qty: 90 TABLET | Refills: 0 | Status: SHIPPED | OUTPATIENT
Start: 2020-06-18 | End: 2020-07-29 | Stop reason: SDUPTHER

## 2020-07-29 DIAGNOSIS — F41.9 ANXIETY: Primary | ICD-10-CM

## 2020-07-29 DIAGNOSIS — M19.90 ARTHRITIS: ICD-10-CM

## 2020-07-29 RX ORDER — TRAMADOL HYDROCHLORIDE 50 MG/1
50 TABLET ORAL EVERY 6 HOURS PRN
Qty: 90 TABLET | Refills: 0 | Status: SHIPPED | OUTPATIENT
Start: 2020-07-29 | End: 2020-09-15 | Stop reason: SDUPTHER

## 2020-07-29 RX ORDER — NALOXONE HYDROCHLORIDE 4 MG/.1ML
SPRAY NASAL
Qty: 1 EACH | Refills: 1 | Status: SHIPPED | OUTPATIENT
Start: 2020-07-29

## 2020-07-29 NOTE — TELEPHONE ENCOUNTER
Needs rx for narcan nasal spray since on both pain meds and anxiety med sin case takes too many pain pills

## 2020-07-30 NOTE — TELEPHONE ENCOUNTER
Patient present today requesting medication refill on her Tramadol to be sent to her [address] [place] [address],9D  Last Ov was 5/24/2018  Neuro

## 2020-08-17 DIAGNOSIS — F41.9 ANXIETY: ICD-10-CM

## 2020-08-17 RX ORDER — CLONAZEPAM 1 MG/1
TABLET ORAL
Qty: 45 TABLET | Refills: 2 | Status: SHIPPED | OUTPATIENT
Start: 2020-08-17 | End: 2020-11-16 | Stop reason: SDUPTHER

## 2020-08-17 NOTE — TELEPHONE ENCOUNTER
07/29/2020  1  07/29/2020  TRAMADOL HCL 50 MG TABLET  90 0  22  MA SHERRY  [id number]  [facility] (8392)  0  20 45 MME  [place]  [place]    07/16/2020  1  05/21/2020  CLONAZEPAM 1 MG TABLET  45 0  30  MA SHERRY  [id number]  [facility] (8392)  2   [place]    06/18/2020  1  06/18/2020  TRAMADOL HCL 50 MG TABLET  90 0  22  MA SHERRY  [id number]  [facility] (8392)  0  20 45 MME  [place]    06/17/2020  1  05/21/2020  CLONAZEPAM 1 MG TABLET  45 0  30  MA SHERRY  [id number]  [facility] (8392)  1   [place]    05/21/2020  1  05/21/2020  CLONAZEPAM 1 MG TABLET  45 0  30  MA SHERRY  [id number]  [facility] (8392)  0   [place]    05/05/2020  1  05/05/2020  TRAMADOL HCL 50 MG TABLET  90 0  22  MA SHERRY  [id number]  [facility] (8392)  0  20 45 MME  [place]    04/24/2020  1  02/27/2020  CLONAZEPAM 1 MG TABLET  45 0  30  MA SHERRY  [id number]  [facility] (8392)  2   [place]

## 2020-08-19 ENCOUNTER — TELEMEDICINE (OUTPATIENT)
Dept: FAMILY MEDICINE CLINIC | Facility: CLINIC | Age: 85
End: 2020-08-19
Payer: COMMERCIAL

## 2020-08-19 ENCOUNTER — TELEPHONE (OUTPATIENT)
Dept: FAMILY MEDICINE CLINIC | Facility: CLINIC | Age: 85
End: 2020-08-19

## 2020-08-19 VITALS — DIASTOLIC BLOOD PRESSURE: 73 MMHG | SYSTOLIC BLOOD PRESSURE: 142 MMHG | HEART RATE: 66 BPM

## 2020-08-19 DIAGNOSIS — Z00.00 MEDICARE ANNUAL WELLNESS VISIT, SUBSEQUENT: ICD-10-CM

## 2020-08-19 DIAGNOSIS — E05.90 HYPERTHYROIDISM: ICD-10-CM

## 2020-08-19 DIAGNOSIS — M19.90 ARTHRITIS: ICD-10-CM

## 2020-08-19 DIAGNOSIS — I10 ESSENTIAL HYPERTENSION: ICD-10-CM

## 2020-08-19 DIAGNOSIS — E78.5 HYPERLIPIDEMIA, UNSPECIFIED HYPERLIPIDEMIA TYPE: ICD-10-CM

## 2020-08-19 DIAGNOSIS — F41.9 ANXIETY: Primary | ICD-10-CM

## 2020-08-19 PROCEDURE — 4040F PNEUMOC VAC/ADMIN/RCVD: CPT | Performed by: FAMILY MEDICINE

## 2020-08-19 PROCEDURE — 1036F TOBACCO NON-USER: CPT | Performed by: FAMILY MEDICINE

## 2020-08-19 PROCEDURE — 1160F RVW MEDS BY RX/DR IN RCRD: CPT | Performed by: FAMILY MEDICINE

## 2020-08-19 PROCEDURE — 1125F AMNT PAIN NOTED PAIN PRSNT: CPT | Performed by: FAMILY MEDICINE

## 2020-08-19 PROCEDURE — 3077F SYST BP >= 140 MM HG: CPT | Performed by: FAMILY MEDICINE

## 2020-08-19 PROCEDURE — 3078F DIAST BP <80 MM HG: CPT | Performed by: FAMILY MEDICINE

## 2020-08-19 PROCEDURE — 99214 OFFICE O/P EST MOD 30 MIN: CPT | Performed by: FAMILY MEDICINE

## 2020-08-19 PROCEDURE — 1170F FXNL STATUS ASSESSED: CPT | Performed by: FAMILY MEDICINE

## 2020-08-19 PROCEDURE — G0439 PPPS, SUBSEQ VISIT: HCPCS | Performed by: FAMILY MEDICINE

## 2020-08-19 NOTE — PROGRESS NOTES
Virtual Regular Visit      Assessment/Plan:    Problem List Items Addressed This Visit        Endocrine    Hyperthyroidism    Relevant Orders    TSH, 3rd generation    CBC and differential       Cardiovascular and Mediastinum    Hypertension     bp stable         Relevant Orders    Comprehensive metabolic panel    CBC and differential       Musculoskeletal and Integument    Arthritis     Stretching meds out, no early refills         Relevant Orders    TRAMADOL, URINE       Other    Anxiety - Primary     Doing well on meds         Relevant Orders    Toxicology screen, urine    Hyperlipidemia    Relevant Orders    Lipid panel               Reason for visit is   Chief Complaint   Patient presents with    Follow-up     4 month    Virtual Regular Visit        Encounter provider Tony Castillo MD    Provider located at [address] [place] [address] [address]      Recent Visits  No visits were found meeting these conditions  Showing recent visits within past 7 days and meeting all other requirements     Today's Visits  Date Type Provider Dept   08/19/20 Telemedicine Tony Castillo MD [facility]   Showing today's visits and meeting all other requirements     Future Appointments  No visits were found meeting these conditions  Showing future appointments within next 150 days and meeting all other requirements        The patient was identified by name and date of birth  Gaye Fidencio was informed that this is a telemedicine visit and that the visit is being conducted through [employer] and patient was informed that this is not a secure, HIPAA-complaint platform  She agrees to proceed     My office door was closed  No one else was in the room  She acknowledged consent and understanding of privacy and security of the video platform  The patient has agreed to participate and understands they can discontinue the visit at any time      Patient is aware this is a billable service  Subjective  Vinita Nair is a 80 y o  female       F/u chronic pain from arthritis and anxiety-doing well on meds, does not take Tramadol or clonazepam every day, stopped BP med  And BP fine, not retaining any fluid       Past Medical History:   Diagnosis Date    Anxiety     Arthritis     Hyperlipidemia     Hypertension     Hyperthyroidism        Past Surgical History:   Procedure Laterality Date    ABDOMINAL SURGERY      peritoneal     APPENDECTOMY      HYSTERECTOMY         Current Outpatient Medications   Medication Sig Dispense Refill    aspirin 81 MG tablet Take 1 tablet by mouth daily      busPIRone (BUSPAR) 5 mg tablet Take 1 tablet (5 mg total) by mouth 2 (two) times a day 60 tablet 5    clonazePAM (KlonoPIN) 1 mg tablet 1/2 am, whole pm 45 tablet 2    naloxone (NARCAN) 4 mg/0 1 mL nasal spray Administer 1 spray into a nostril if takes extra Tramadol  1 each 1    traMADol (ULTRAM) 50 mg tablet Take 1 tablet (50 mg total) by mouth every 6 (six) hours as needed for moderate pain 90 tablet 0     No current facility-administered medications for this visit  No Known Allergies    Review of Systems   Constitutional: Negative for activity change, appetite change, fatigue and unexpected weight change  Respiratory: Negative for shortness of breath  Cardiovascular: Negative for chest pain and leg swelling  Musculoskeletal: Positive for arthralgias  Neurological: Negative for dizziness and headaches  Psychiatric/Behavioral: The patient is nervous/anxious  Video Exam    Vitals:    08/19/20 0901   BP: 142/73   Pulse: 66       Physical Exam  Vitals signs reviewed  Constitutional:       Appearance: Normal appearance  Eyes:      General:         Right eye: No discharge  Left eye: No discharge  Pulmonary:      Effort: Pulmonary effort is normal  No respiratory distress     Musculoskeletal:      Comments: Mildly uncomfortable   Neurological: Mental Status: She is alert  Psychiatric:         Mood and Affect: Mood normal           I spent 10 minutes directly with the patient during this visit      VIRTUAL VISIT Anne Marie Vaughn acknowledges that she has consented to an online visit or consultation  She understands that the online visit is based solely on information provided by her, and that, in the absence of a face-to-face physical evaluation by the physician, the diagnosis she receives is both limited and provisional in terms of accuracy and completeness  This is not intended to replace a full medical face-to-face evaluation by the physician  Joanna Husain understands and accepts these terms

## 2020-08-19 NOTE — PROGRESS NOTES
Assessment and Plan:     Problem List Items Addressed This Visit        Endocrine    Hyperthyroidism    Relevant Orders    TSH, 3rd generation    CBC and differential       Cardiovascular and Mediastinum    Hypertension     bp stable         Relevant Orders    Comprehensive metabolic panel    CBC and differential       Musculoskeletal and Integument    Arthritis     Stretching meds out, no early refills         Relevant Orders    TRAMADOL, URINE       Other    Anxiety - Primary     Doing well on meds         Relevant Orders    Toxicology screen, urine    Hyperlipidemia    Relevant Orders    Lipid panel           Preventive health issues were discussed with patient, and age appropriate screening tests were ordered as noted in patient's After Visit Summary  Personalized health advice and appropriate referrals for health education or preventive services given if needed, as noted in patient's After Visit Summary       History of Present Illness:     Patient presents for Medicare Annual Wellness visit    Patient Care Team:  Jorge Littlejohn MD as PCP - General     Problem List:     Patient Active Problem List   Diagnosis    Anxiety    Arthritis    Hyperlipidemia    Hypertension    Hyperthyroidism    Meningioma [facility])      Past Medical and Surgical History:     Past Medical History:   Diagnosis Date    Anxiety     Arthritis     Hyperlipidemia     Hypertension     Hyperthyroidism      Past Surgical History:   Procedure Laterality Date    ABDOMINAL SURGERY      peritoneal     APPENDECTOMY      HYSTERECTOMY        Family History:     Family History   Problem Relation Age of Onset    Heart disease Father     Pulmonary embolism Father     Dementia Sister     Lung cancer Other     Diabetes Mother       Social History:        Social History     Socioeconomic History    Marital status: /Civil Union     Spouse name: None    Number of children: 2    Years of education: None    Highest education level: None Occupational History    Occupation: retired   Social Needs    Financial resource strain: None    Food insecurity     Worry: None     Inability: None    Transportation needs     Medical: None     Non-medical: None   Tobacco Use    Smoking status: Former Smoker     Years: 15 00    Smokeless tobacco: Never Used   Substance and Sexual Activity    Alcohol use: No    Drug use: No    Sexual activity: Never   Lifestyle    Physical activity     Days per week: None     Minutes per session: None    Stress: None   Relationships    Social connections     Talks on phone: None     Gets together: None     Attends Spiritism service: None     Active member of club or organization: None     Attends meetings of clubs or organizations: None     Relationship status: None    Intimate partner violence     Fear of current or ex partner: None     Emotionally abused: None     Physically abused: None     Forced sexual activity: None   Other Topics Concern    None   Social History Narrative    Always uses seatbelt    Apartment lives in apt as part of daughters house    Pt has a living will    Power of  in existence    Jain     Supportive and safe    Active advance directive    Advance directive information unavailable      Medications and Allergies:     Current Outpatient Medications   Medication Sig Dispense Refill    aspirin 81 MG tablet Take 1 tablet by mouth daily      busPIRone (BUSPAR) 5 mg tablet Take 1 tablet (5 mg total) by mouth 2 (two) times a day 60 tablet 5    clonazePAM (KlonoPIN) 1 mg tablet 1/2 am, whole pm 45 tablet 2    naloxone (NARCAN) 4 mg/0 1 mL nasal spray Administer 1 spray into a nostril if takes extra Tramadol  1 each 1    traMADol (ULTRAM) 50 mg tablet Take 1 tablet (50 mg total) by mouth every 6 (six) hours as needed for moderate pain 90 tablet 0     No current facility-administered medications for this visit        No Known Allergies   Immunizations:     Immunization History Administered Date(s) Administered    Influenza Quadrivalent Preservative Free 3 years and older IM 11/24/2014    Influenza Split High Dose Preservative Free IM 11/12/2015    Influenza TIV (IM) 11/06/2013    Pneumococcal Polysaccharide PPV23 10/01/2002, 03/26/2014      Health Maintenance: There are no preventive care reminders to display for this patient  There are no preventive care reminders to display for this patient  Medicare Health Risk Assessment:     /73   Pulse 66      Alexis Snare is here for her Subsequent Wellness visit  Health Risk Assessment:   Patient rates overall health as good  Patient feels that their physical health rating is same  Eyesight was rated as slightly worse  Hearing was rated as same  Patient feels that their emotional and mental health rating is same  Pain experienced in the last 7 days has been some  Patient's pain rating has been 5/10  Patient states that she has experienced no weight loss or gain in last 6 months  back    Fall Risk Screening: In the past year, patient has experienced: no history of falling in past year      Urinary Incontinence Screening:   Patient has not leaked urine accidently in the last six months  Nutrition:   Current diet is Regular  Medications:   Patient is currently taking over-the-counter supplements  OTC medications include: see medication list  Patient is able to manage medications  Activities of Daily Living (ADLs)/Instrumental Activities of Daily Living (IADLs):   Walk and transfer into and out of bed and chair?: Yes  Dress and groom yourself?: Yes    Bathe or shower yourself?: Yes    Feed yourself?  Yes  Do your laundry/housekeeping?: No  Manage your money, pay your bills and track your expenses?: Yes  Make your own meals?: No    Do your own shopping?: No    ADL comments: Son and daughter in law do shopping, housekeeping,help with food prep and paying bills    Advance Care Planning:   Living will: Yes    Durable POA for healthcare:  Yes    Advanced directive: Yes      Cognitive Screening:   Provider or family/friend/caregiver concerned regarding cognition?: No    PREVENTIVE SCREENINGS      Cardiovascular Screening:    General: History Lipid Disorder and Risks and Benefits Discussed    Due for: Lipid Panel      Diabetes Screening:     General: Risks and Benefits Discussed    Due for: Blood Glucose      Colorectal Cancer Screening:     General: Screening Not Indicated      Breast Cancer Screening:     General: Screening Not Indicated      Cervical Cancer Screening:    General: Screening Not Indicated      Osteoporosis Screening:    General: Screening Not Indicated      Abdominal Aortic Aneurysm (AAA) Screening:        General: Screening Not Indicated      Lung Cancer Screening:     General: Screening Not Indicated      Paris Escalona MD

## 2020-08-20 NOTE — TELEPHONE ENCOUNTER
Is she considered Home Bound? And what lab usually goes to her home? I can't find anything with that info

## 2020-09-15 DIAGNOSIS — M19.90 ARTHRITIS: ICD-10-CM

## 2020-09-15 DIAGNOSIS — M19.90 ARTHRITIS: Primary | ICD-10-CM

## 2020-09-15 DIAGNOSIS — F41.9 ANXIETY: ICD-10-CM

## 2020-09-15 RX ORDER — TRAMADOL HYDROCHLORIDE 50 MG/1
50 TABLET ORAL EVERY 6 HOURS PRN
Qty: 90 TABLET | Refills: 0 | Status: SHIPPED | OUTPATIENT
Start: 2020-09-15 | End: 2020-10-29 | Stop reason: SDUPTHER

## 2020-09-16 ENCOUNTER — TELEPHONE (OUTPATIENT)
Dept: FAMILY MEDICINE CLINIC | Facility: CLINIC | Age: 85
End: 2020-09-16

## 2020-09-16 NOTE — TELEPHONE ENCOUNTER
Call from [facility] stating they are having trouble filling script for Tramadol, insurance is refusing since pt is already on Clonazepam and also indicates a interaction with the Buspar, please call pharmacy and let them know if  is aware of these interactions

## 2020-09-16 NOTE — TELEPHONE ENCOUNTER
Yes I am aware, pt green snot take either tramadol or clonazepam every day if they look at refill history, both meds are prn, ok to fill

## 2020-10-29 DIAGNOSIS — M19.90 ARTHRITIS: ICD-10-CM

## 2020-10-29 DIAGNOSIS — F41.9 ANXIETY: ICD-10-CM

## 2020-10-29 RX ORDER — TRAMADOL HYDROCHLORIDE 50 MG/1
50 TABLET ORAL EVERY 6 HOURS PRN
Qty: 90 TABLET | Refills: 0 | Status: SHIPPED | OUTPATIENT
Start: 2020-10-29 | End: 2020-12-14 | Stop reason: SDUPTHER

## 2020-10-30 RX ORDER — BUSPIRONE HYDROCHLORIDE 5 MG/1
5 TABLET ORAL 2 TIMES DAILY
Qty: 60 TABLET | Refills: 5 | Status: SHIPPED | OUTPATIENT
Start: 2020-10-30 | End: 2021-04-30 | Stop reason: SDUPTHER

## 2020-11-16 DIAGNOSIS — F41.9 ANXIETY: ICD-10-CM

## 2020-11-16 RX ORDER — CLONAZEPAM 1 MG/1
TABLET ORAL
Qty: 45 TABLET | Refills: 2 | Status: SHIPPED | OUTPATIENT
Start: 2020-11-16 | End: 2021-02-15 | Stop reason: SDUPTHER

## 2020-12-14 DIAGNOSIS — M19.90 ARTHRITIS: ICD-10-CM

## 2020-12-14 RX ORDER — TRAMADOL HYDROCHLORIDE 50 MG/1
50 TABLET ORAL EVERY 6 HOURS PRN
Qty: 90 TABLET | Refills: 0 | Status: SHIPPED | OUTPATIENT
Start: 2020-12-14 | End: 2021-01-28 | Stop reason: SDUPTHER

## 2021-01-28 DIAGNOSIS — M19.90 ARTHRITIS: ICD-10-CM

## 2021-01-29 RX ORDER — TRAMADOL HYDROCHLORIDE 50 MG/1
50 TABLET ORAL EVERY 6 HOURS PRN
Qty: 60 TABLET | Refills: 0 | Status: SHIPPED | OUTPATIENT
Start: 2021-01-29 | End: 2021-02-26 | Stop reason: SDUPTHER

## 2021-02-15 DIAGNOSIS — F41.9 ANXIETY: ICD-10-CM

## 2021-02-15 RX ORDER — CLONAZEPAM 1 MG/1
TABLET ORAL
Qty: 45 TABLET | Refills: 2 | Status: SHIPPED | OUTPATIENT
Start: 2021-02-15 | End: 2021-05-12 | Stop reason: SDUPTHER

## 2021-02-15 NOTE — TELEPHONE ENCOUNTER
Spoke to pt, informed her med was sent to pharmacy and f/u was needed   She stated will call in two weeks to schedule f/u

## 2021-02-15 NOTE — TELEPHONE ENCOUNTER
Pt called requesting refill for pending medication  Pts last OV was 8/19/20 for f/u  Please send pending med to [facility] if appropriate   Thank you

## 2021-02-22 NOTE — PROGRESS NOTES
Assessment & Plan:     E05 90  Hyperthyroidism  I have evaluated the patient and found the condition to be: Stable  I have evaluated the patient and: Recommended continue with same treatment plan     I10  Hypertension  I have evaluated the patient and found the condition to be: Stable  I have evaluated the patient and: Recommended continue with same treatment plan     D32 9  Meningioma ([facility] [address] )  I have evaluated the patient and found the condition to be: Stable  I have evaluated the patient and: Recommended continue with same treatment plan  The patient should continue treatment and follow-up with: pt asymptomatic and does not wish further testing    M19 90  Arthritis  I have evaluated the patient and found the condition to be: Worsening  I have evaluated the patient and: Recommended continue with same treatment plan  The patient should continue treatment and follow-up with: pt takes Tramadol , hands crippled arthritis, offered ortho eval    E78 5  Hyperlipidemia  I have evaluated the patient and found the condition to be: Stable  I have evaluated the patient and: Recommended continue with same treatment plan    F41 9  Anxiety  I have evaluated the patient and found the condition to be: Stable  I have evaluated the patient and: Recommended continue with same treatment plan         Subjective:     Patient ID: Annelise Zavala is a 80 y o  female     No chief complaint on file  HPI    Review of Systems    Objective: There were no vitals taken for this visit      Problem List Items Addressed This Visit     None          Physical Exam

## 2021-02-26 DIAGNOSIS — M19.90 ARTHRITIS: ICD-10-CM

## 2021-02-26 RX ORDER — TRAMADOL HYDROCHLORIDE 50 MG/1
50 TABLET ORAL EVERY 6 HOURS PRN
Qty: 60 TABLET | Refills: 0 | Status: SHIPPED | OUTPATIENT
Start: 2021-02-26 | End: 2021-03-29 | Stop reason: SDUPTHER

## 2021-03-04 ENCOUNTER — TELEPHONE (OUTPATIENT)
Dept: FAMILY MEDICINE CLINIC | Facility: CLINIC | Age: 86
End: 2021-03-04

## 2021-03-04 ENCOUNTER — OFFICE VISIT (OUTPATIENT)
Dept: FAMILY MEDICINE CLINIC | Facility: CLINIC | Age: 86
End: 2021-03-04
Payer: COMMERCIAL

## 2021-03-04 VITALS
WEIGHT: 117 LBS | DIASTOLIC BLOOD PRESSURE: 86 MMHG | TEMPERATURE: 97.6 F | BODY MASS INDEX: 23.63 KG/M2 | SYSTOLIC BLOOD PRESSURE: 142 MMHG | HEART RATE: 66 BPM

## 2021-03-04 DIAGNOSIS — E78.5 HYPERLIPIDEMIA, UNSPECIFIED HYPERLIPIDEMIA TYPE: ICD-10-CM

## 2021-03-04 DIAGNOSIS — F41.9 ANXIETY: ICD-10-CM

## 2021-03-04 DIAGNOSIS — M19.90 ARTHRITIS: ICD-10-CM

## 2021-03-04 DIAGNOSIS — E05.90 HYPERTHYROIDISM: ICD-10-CM

## 2021-03-04 DIAGNOSIS — I10 ESSENTIAL HYPERTENSION: Primary | ICD-10-CM

## 2021-03-04 PROCEDURE — 1160F RVW MEDS BY RX/DR IN RCRD: CPT | Performed by: FAMILY MEDICINE

## 2021-03-04 PROCEDURE — T1015 CLINIC SERVICE: HCPCS | Performed by: FAMILY MEDICINE

## 2021-03-04 PROCEDURE — 99214 OFFICE O/P EST MOD 30 MIN: CPT | Performed by: FAMILY MEDICINE

## 2021-03-04 PROCEDURE — 1036F TOBACCO NON-USER: CPT | Performed by: FAMILY MEDICINE

## 2021-03-04 PROCEDURE — 3725F SCREEN DEPRESSION PERFORMED: CPT | Performed by: FAMILY MEDICINE

## 2021-03-04 NOTE — PROGRESS NOTES
Assessment/Plan:    Anxiety  Stable on meds, gave urine with last home draw but never got results    Arthritis  Stable on meds, gave urine with last home draw but never got results    Hypertension  Bp stable    Hyperthyroidism  Lab stable off meds    Hyperlipidemia  refrigerate fish oil, pt does not wish change in meds       Diagnoses and all orders for this visit:    Essential hypertension    Hyperthyroidism    Arthritis    Anxiety    Hyperlipidemia, unspecified hyperlipidemia type          Subjective:      Patient ID: Roxi Cintron is a 80 y o  female  F/u chronic health conditions, no cp sob, headaches, arthritis really bothering her, declines covid shot      The following portions of the patient's history were reviewed and updated as appropriate: allergies, current medications, past family history, past medical history, past social history, past surgical history and problem list       Review of Systems   Constitutional: Negative for activity change, appetite change and fatigue  Respiratory: Negative for shortness of breath  Cardiovascular: Negative for chest pain  Musculoskeletal: Positive for arthralgias and joint swelling  Neurological: Negative for dizziness and headaches  Objective:      /86 (BP Location: Left arm, Patient Position: Sitting, Cuff Size: Adult)   Pulse 66   Temp 97 6 °F (36 4 °C) (Temporal)   Wt 53 1 kg (117 lb)   BMI 23 63 kg/m²          Physical Exam  Vitals signs reviewed  Constitutional:       Appearance: Normal appearance  Neck:      Vascular: No carotid bruit  Cardiovascular:      Rate and Rhythm: Normal rate and regular rhythm  Pulses: Normal pulses  Heart sounds: Normal heart sounds  Pulmonary:      Effort: Pulmonary effort is normal       Breath sounds: Normal breath sounds  Musculoskeletal:         General: Tenderness and deformity present  Lymphadenopathy:      Cervical: No cervical adenopathy     Neurological:      Mental Status: She is alert     Psychiatric:         Mood and Affect: Mood normal

## 2021-03-20 ENCOUNTER — TELEPHONE (OUTPATIENT)
Dept: FAMILY MEDICINE CLINIC | Facility: CLINIC | Age: 86
End: 2021-03-20

## 2021-03-20 NOTE — TELEPHONE ENCOUNTER
Ariana from the lab called asking if the toxicology should be screened for 5,7,9,10? Unsure what she referring to? Can you please clarify what you would prefer

## 2021-03-22 DIAGNOSIS — M19.90 ARTHRITIS: Primary | ICD-10-CM

## 2021-03-23 NOTE — TELEPHONE ENCOUNTER
lilliam from [facility] called again from the [facility] states she still hasnt received the new orders for the klonopin  After looking at the notes it was not ordered but the tramadol was ordered again  So they will just do the tramadol only

## 2021-03-29 DIAGNOSIS — M19.90 ARTHRITIS: ICD-10-CM

## 2021-03-29 RX ORDER — TRAMADOL HYDROCHLORIDE 50 MG/1
50 TABLET ORAL EVERY 6 HOURS PRN
Qty: 60 TABLET | Refills: 0 | Status: SHIPPED | OUTPATIENT
Start: 2021-03-29 | End: 2021-04-27 | Stop reason: SDUPTHER

## 2021-04-15 ENCOUNTER — TELEPHONE (OUTPATIENT)
Dept: FAMILY MEDICINE CLINIC | Facility: CLINIC | Age: 86
End: 2021-04-15

## 2021-04-15 NOTE — TELEPHONE ENCOUNTER
Ed from [facility] called regarding pt's Klonopin  The insurance is concerned about a potential interaction between the Klonopin & the Tramadol  The pharmacy needs to confirm that Dr Sabi Rousseau would like the pt on both medications  You can call the pharmacy at [phone] - they open at 9 & cannot take calls prior to 9  Thank you!

## 2021-04-27 DIAGNOSIS — M19.90 ARTHRITIS: ICD-10-CM

## 2021-04-27 RX ORDER — TRAMADOL HYDROCHLORIDE 50 MG/1
50 TABLET ORAL EVERY 6 HOURS PRN
Qty: 60 TABLET | Refills: 0 | Status: SHIPPED | OUTPATIENT
Start: 2021-04-27 | End: 2021-05-26 | Stop reason: SDUPTHER

## 2021-04-30 DIAGNOSIS — F41.9 ANXIETY: ICD-10-CM

## 2021-04-30 RX ORDER — BUSPIRONE HYDROCHLORIDE 5 MG/1
5 TABLET ORAL 2 TIMES DAILY
Qty: 60 TABLET | Refills: 5 | Status: SHIPPED | OUTPATIENT
Start: 2021-04-30 | End: 2021-10-27 | Stop reason: SDUPTHER

## 2021-05-12 DIAGNOSIS — F41.9 ANXIETY: ICD-10-CM

## 2021-05-12 RX ORDER — CLONAZEPAM 1 MG/1
TABLET ORAL
Qty: 45 TABLET | Refills: 2 | Status: SHIPPED | OUTPATIENT
Start: 2021-05-12 | End: 2021-08-09 | Stop reason: SDUPTHER

## 2021-05-26 DIAGNOSIS — M19.90 ARTHRITIS: ICD-10-CM

## 2021-05-26 RX ORDER — TRAMADOL HYDROCHLORIDE 50 MG/1
50 TABLET ORAL EVERY 6 HOURS PRN
Qty: 60 TABLET | Refills: 0 | Status: SHIPPED | OUTPATIENT
Start: 2021-05-26 | End: 2021-06-24 | Stop reason: SDUPTHER

## 2021-06-24 DIAGNOSIS — M19.90 ARTHRITIS: ICD-10-CM

## 2021-06-24 RX ORDER — TRAMADOL HYDROCHLORIDE 50 MG/1
50 TABLET ORAL EVERY 6 HOURS PRN
Qty: 60 TABLET | Refills: 0 | Status: SHIPPED | OUTPATIENT
Start: 2021-06-24 | End: 2021-07-26 | Stop reason: SDUPTHER

## 2021-06-25 NOTE — TELEPHONE ENCOUNTER
Received call from Treva Hood at [facility], please contact at [phone] for approval of tramadol/clonazepam as noted

## 2021-07-26 DIAGNOSIS — M19.90 ARTHRITIS: ICD-10-CM

## 2021-07-26 DIAGNOSIS — F41.9 ANXIETY: ICD-10-CM

## 2021-07-26 RX ORDER — TRAMADOL HYDROCHLORIDE 50 MG/1
50 TABLET ORAL EVERY 6 HOURS PRN
Qty: 60 TABLET | Refills: 0 | Status: SHIPPED | OUTPATIENT
Start: 2021-07-26 | End: 2021-08-24 | Stop reason: SDUPTHER

## 2021-07-26 RX ORDER — CLONAZEPAM 1 MG/1
TABLET ORAL
Qty: 45 TABLET | Refills: 2 | OUTPATIENT
Start: 2021-07-26

## 2021-07-26 RX ORDER — TRAMADOL HYDROCHLORIDE 50 MG/1
50 TABLET ORAL EVERY 6 HOURS PRN
Qty: 60 TABLET | Refills: 0 | OUTPATIENT
Start: 2021-07-26

## 2021-08-09 ENCOUNTER — TELEPHONE (OUTPATIENT)
Dept: FAMILY MEDICINE CLINIC | Facility: CLINIC | Age: 86
End: 2021-08-09

## 2021-08-09 DIAGNOSIS — F41.9 ANXIETY: ICD-10-CM

## 2021-08-09 RX ORDER — CLONAZEPAM 1 MG/1
TABLET ORAL
Qty: 45 TABLET | Refills: 2 | Status: SHIPPED | OUTPATIENT
Start: 2021-08-09 | End: 2021-11-08 | Stop reason: SDUPTHER

## 2021-08-09 NOTE — TELEPHONE ENCOUNTER
[facility] called in stating patient's insurance denied her rx for Clonazepam because they do not like the combination of Clonazepam and Tramadol  Per Dr Sally Berrios it is okay to take both

## 2021-08-24 DIAGNOSIS — M19.90 ARTHRITIS: ICD-10-CM

## 2021-08-24 RX ORDER — TRAMADOL HYDROCHLORIDE 50 MG/1
50 TABLET ORAL EVERY 6 HOURS PRN
Qty: 60 TABLET | Refills: 0 | Status: SHIPPED | OUTPATIENT
Start: 2021-08-24 | End: 2021-09-23 | Stop reason: SDUPTHER

## 2021-09-08 ENCOUNTER — OFFICE VISIT (OUTPATIENT)
Dept: FAMILY MEDICINE CLINIC | Facility: CLINIC | Age: 86
End: 2021-09-08
Payer: COMMERCIAL

## 2021-09-08 VITALS
WEIGHT: 113.5 LBS | TEMPERATURE: 97.6 F | BODY MASS INDEX: 22.92 KG/M2 | SYSTOLIC BLOOD PRESSURE: 142 MMHG | HEART RATE: 96 BPM | DIASTOLIC BLOOD PRESSURE: 90 MMHG | OXYGEN SATURATION: 97 %

## 2021-09-08 DIAGNOSIS — F41.9 ANXIETY: ICD-10-CM

## 2021-09-08 DIAGNOSIS — I10 ESSENTIAL HYPERTENSION: ICD-10-CM

## 2021-09-08 DIAGNOSIS — M19.90 ARTHRITIS: ICD-10-CM

## 2021-09-08 DIAGNOSIS — Z00.00 MEDICARE ANNUAL WELLNESS VISIT, SUBSEQUENT: Primary | ICD-10-CM

## 2021-09-08 PROCEDURE — 3725F SCREEN DEPRESSION PERFORMED: CPT | Performed by: FAMILY MEDICINE

## 2021-09-08 PROCEDURE — G0439 PPPS, SUBSEQ VISIT: HCPCS | Performed by: FAMILY MEDICINE

## 2021-09-08 PROCEDURE — 1101F PT FALLS ASSESS-DOCD LE1/YR: CPT | Performed by: FAMILY MEDICINE

## 2021-09-08 PROCEDURE — 3288F FALL RISK ASSESSMENT DOCD: CPT | Performed by: FAMILY MEDICINE

## 2021-09-08 PROCEDURE — 1036F TOBACCO NON-USER: CPT | Performed by: FAMILY MEDICINE

## 2021-09-08 PROCEDURE — 1125F AMNT PAIN NOTED PAIN PRSNT: CPT | Performed by: FAMILY MEDICINE

## 2021-09-08 PROCEDURE — 1170F FXNL STATUS ASSESSED: CPT | Performed by: FAMILY MEDICINE

## 2021-09-08 PROCEDURE — 1160F RVW MEDS BY RX/DR IN RCRD: CPT | Performed by: FAMILY MEDICINE

## 2021-09-08 PROCEDURE — 99214 OFFICE O/P EST MOD 30 MIN: CPT | Performed by: FAMILY MEDICINE

## 2021-09-08 NOTE — PROGRESS NOTES
Assessment and Plan:    Problem List Items Addressed This Visit        Cardiovascular and Mediastinum    Hypertension     bp stable            Musculoskeletal and Integument    Arthritis     Stable on Tramadol, no early refills, utd uds, pain contract signed            Other    Anxiety     Stable on Clonazepam           Other Visit Diagnoses     Medicare annual wellness visit, subsequent    -  Primary                 Diagnoses and all orders for this visit:    Medicare annual wellness visit, subsequent    Essential hypertension    Anxiety    Arthritis              Subjective:      Patient ID: Erendira Oliver is a 80 y o  female  CC:    Chief Complaint   Patient presents with    Follow-up     for chronic conditions  mgb    Medicare Wellness Visit     mgb       HPI:    Here for f/u HTN, arthritis, anxiety, stable on meds, no cp, no shortness of breath, no headaches, last lab stable      The following portions of the patient's history were reviewed and updated as appropriate: allergies, current medications, past family history, past medical history, past social history, past surgical history and problem list       Review of Systems   Constitutional: Negative for activity change, appetite change and fatigue  Respiratory: Negative for shortness of breath  Cardiovascular: Negative for chest pain  Musculoskeletal: Positive for arthralgias and myalgias  Neurological: Negative for dizziness and headaches  Psychiatric/Behavioral: The patient is nervous/anxious  Data to review:       Objective:    Vitals:    09/08/21 1110   BP: 142/90   BP Location: Right arm   Patient Position: Sitting   Cuff Size: Standard   Pulse: 96   Temp: 97 6 °F (36 4 °C)   TempSrc: Temporal   SpO2: 97%   Weight: 51 5 kg (113 lb 8 oz)        Physical Exam  Vitals reviewed  Constitutional:       Appearance: Normal appearance  HENT:      Nose: No congestion or rhinorrhea  Neck:      Vascular: No carotid bruit  Cardiovascular:      Rate and Rhythm: Normal rate and regular rhythm  Pulses: Normal pulses  Heart sounds: Normal heart sounds  Pulmonary:      Effort: Pulmonary effort is normal       Breath sounds: Normal breath sounds  Musculoskeletal:      Right lower leg: No edema  Left lower leg: No edema  Lymphadenopathy:      Cervical: No cervical adenopathy  Neurological:      Mental Status: She is alert     Psychiatric:         Mood and Affect: Mood normal

## 2021-09-08 NOTE — PROGRESS NOTES
Assessment and Plan:     Problem List Items Addressed This Visit        Cardiovascular and Mediastinum    Hypertension     bp stable            Musculoskeletal and Integument    Arthritis     Stable on Tramadol, no early refills, utd uds, pain contract signed            Other    Anxiety     Stable on Clonazepam           Other Visit Diagnoses     Medicare annual wellness visit, subsequent    -  Primary           Preventive health issues were discussed with patient, and age appropriate screening tests were ordered as noted in patient's After Visit Summary  Personalized health advice and appropriate referrals for health education or preventive services given if needed, as noted in patient's After Visit Summary       History of Present Illness:     Patient presents for Medicare Annual Wellness visit    Patient Care Team:  Yolis Byrd MD as PCP - Arleth Dickson MD as PCP - PCP-[facility] Attributed-Roster     Problem List:     Patient Active Problem List   Diagnosis    Anxiety    Arthritis    Hyperlipidemia    Hypertension    Hyperthyroidism    Meningioma [facility])      Past Medical and Surgical History:     Past Medical History:   Diagnosis Date    Anxiety     Arthritis     Hyperlipidemia     Hypertension     Hyperthyroidism      Past Surgical History:   Procedure Laterality Date    ABDOMINAL SURGERY      peritoneal     APPENDECTOMY      HYSTERECTOMY        Family History:     Family History   Problem Relation Age of Onset    Heart disease Father     Pulmonary embolism Father     Dementia Sister     Lung cancer Other     Diabetes Mother       Social History:     Social History     Socioeconomic History    Marital status: /Civil Union     Spouse name: None    Number of children: 2    Years of education: None    Highest education level: None   Occupational History    Occupation: retired   Tobacco Use    Smoking status: Former Smoker     Years: 15 00    Smokeless tobacco: Never Used   Substance and Sexual Activity    Alcohol use: No    Drug use: No    Sexual activity: Never   Other Topics Concern    None   Social History Narrative    Always uses seatbelt    Apartment lives in apt as part of daughters house    Pt has a living will    Power of  in existence    Muslim     Supportive and safe    Active advance directive    Advance directive information unavailable     Social Determinants of Health     Financial Resource Strain:     Difficulty of Paying Living Expenses:    Food Insecurity:     Worried About Running Out of Food in the Last Year:     Ran Out of Food in the Last Year:    Transportation Needs:     Lack of Transportation (Medical):  Lack of Transportation (Non-Medical):    Physical Activity:     Days of Exercise per Week:     Minutes of Exercise per Session:    Stress:     Feeling of Stress :    Social Connections:     Frequency of Communication with Friends and Family:     Frequency of Social Gatherings with Friends and Family:     Attends Scientologist Services:     Active Member of Clubs or Organizations:     Attends Club or Organization Meetings:     Marital Status:    Intimate Partner Violence:     Fear of Current or Ex-Partner:     Emotionally Abused:     Physically Abused:     Sexually Abused:       Medications and Allergies:     Current Outpatient Medications   Medication Sig Dispense Refill    aspirin 81 MG tablet Take 1 tablet by mouth daily      busPIRone (BUSPAR) 5 mg tablet Take 1 tablet (5 mg total) by mouth 2 (two) times a day 60 tablet 5    clonazePAM (KlonoPIN) 1 mg tablet 1/2 am, whole pm 45 tablet 2    traMADol (ULTRAM) 50 mg tablet Take 1 tablet (50 mg total) by mouth every 6 (six) hours as needed for moderate pain As per MF Ok to refill  60 tablet 0    naloxone (NARCAN) 4 mg/0 1 mL nasal spray Administer 1 spray into a nostril if takes extra Tramadol   (Patient not taking: Reported on 9/8/2021) 1 each 1     No current facility-administered medications for this visit  No Known Allergies   Immunizations:     Immunization History   Administered Date(s) Administered    Influenza Quadrivalent Preservative Free 3 years and older IM 11/24/2014    Influenza Split High Dose Preservative Free IM 11/12/2015    Influenza, seasonal, injectable 11/06/2013    Pneumococcal Polysaccharide PPV23 10/01/2002, 03/26/2014      Health Maintenance: There are no preventive care reminders to display for this patient  Topic Date Due    COVID-19 Vaccine (1) Never done    Influenza Vaccine (1) 09/01/2021      Medicare Health Risk Assessment:     /90 (BP Location: Right arm, Patient Position: Sitting, Cuff Size: Standard)   Pulse 96   Temp 97 6 °F (36 4 °C) (Temporal)   Wt 51 5 kg (113 lb 8 oz)   SpO2 97%   BMI 22 92 kg/m²      Joy Guerni is here for her Subsequent Wellness visit  Health Risk Assessment:   Patient rates overall health as good  Patient feels that their physical health rating is same  Patient is satisfied with their life  Eyesight was rated as same  Hearing was rated as same  Patient feels that their emotional and mental health rating is same  Patients states they are never, rarely angry  Patient states they are never, rarely unusually tired/fatigued  Pain experienced in the last 7 days has been some  Patient's pain rating has been 8/10  Patient states that she has experienced no weight loss or gain in last 6 months  Arthritis is hurting her all over    Depression Screening:   PHQ-2 Score: 0      Fall Risk Screening: In the past year, patient has experienced: no history of falling in past year      Urinary Incontinence Screening:   Patient has leaked urine accidently in the last six months  Home Safety:  Patient has trouble with stairs inside or outside of their home  Patient has working smoke alarms and has working carbon monoxide detector  Home safety hazards include: none       Nutrition:   Current diet is Regular  Medications:   Patient is currently taking over-the-counter supplements  OTC medications include: see medication list  Patient is able to manage medications  Activities of Daily Living (ADLs)/Instrumental Activities of Daily Living (IADLs):   Walk and transfer into and out of bed and chair?: Yes  Dress and groom yourself?: Yes    Bathe or shower yourself?: Yes    Feed yourself? Yes  Do your laundry/housekeeping?: No  Manage your money, pay your bills and track your expenses?: Yes  Make your own meals?: No    Do your own shopping?: No    Previous Hospitalizations:   Any hospitalizations or ED visits within the last 12 months?: No      Advance Care Planning:   Living will: Yes    Durable POA for healthcare: Yes    Advanced directive: Yes      Cognitive Screening:   Provider or family/friend/caregiver concerned regarding cognition?: No    PREVENTIVE SCREENINGS      Cardiovascular Screening:    General: History Lipid Disorder and Screening Current      Diabetes Screening:     General: Screening Current      Colorectal Cancer Screening:     General: Screening Not Indicated      Breast Cancer Screening:     General: Screening Not Indicated      Cervical Cancer Screening:    General: Screening Not Indicated      Osteoporosis Screening:    General: Screening Not Indicated      Abdominal Aortic Aneurysm (AAA) Screening:        General: Screening Not Indicated      Lung Cancer Screening:     General: Screening Not Indicated      Hepatitis C Screening:    General: Patient Declines    Screening, Brief Intervention, and Referral to Treatment (SBIRT)    Screening  Typical number of drinks in a day: 0  Typical number of drinks in a week: 0  Interpretation: Low risk drinking behavior      Single Item Drug Screening:  How often have you used an illegal drug (including marijuana) or a prescription medication for non-medical reasons in the past year? never    Single Item Drug Screen Score: 0  Interpretation: Negative screen for possible drug use disorder    Review of Current Opioid Use  Opioid Risk Tool (ORT) Score: 0  Opioid Risk Tool (ORT) Interpretation: Score 0-3: Low risk for opioid misuse    Declines covid vaccine, will think about tetanus shot  Musa Alva MD

## 2021-09-23 DIAGNOSIS — M19.90 ARTHRITIS: ICD-10-CM

## 2021-09-23 RX ORDER — TRAMADOL HYDROCHLORIDE 50 MG/1
50 TABLET ORAL EVERY 6 HOURS PRN
Qty: 60 TABLET | Refills: 0 | Status: SHIPPED | OUTPATIENT
Start: 2021-09-23 | End: 2021-10-21 | Stop reason: SDUPTHER

## 2021-10-21 DIAGNOSIS — M19.90 ARTHRITIS: ICD-10-CM

## 2021-10-22 RX ORDER — TRAMADOL HYDROCHLORIDE 50 MG/1
50 TABLET ORAL EVERY 6 HOURS PRN
Qty: 60 TABLET | Refills: 0 | Status: SHIPPED | OUTPATIENT
Start: 2021-10-22 | End: 2021-11-22 | Stop reason: SDUPTHER

## 2021-10-27 DIAGNOSIS — F41.9 ANXIETY: ICD-10-CM

## 2021-10-27 RX ORDER — BUSPIRONE HYDROCHLORIDE 5 MG/1
5 TABLET ORAL 2 TIMES DAILY
Qty: 60 TABLET | Refills: 5 | Status: SHIPPED | OUTPATIENT
Start: 2021-10-27 | End: 2022-04-28 | Stop reason: SDUPTHER

## 2021-11-08 DIAGNOSIS — F41.9 ANXIETY: ICD-10-CM

## 2021-11-08 RX ORDER — CLONAZEPAM 1 MG/1
TABLET ORAL
Qty: 45 TABLET | Refills: 2 | Status: SHIPPED | OUTPATIENT
Start: 2021-11-08 | End: 2022-02-07

## 2021-11-22 DIAGNOSIS — M19.90 ARTHRITIS: ICD-10-CM

## 2021-11-22 RX ORDER — TRAMADOL HYDROCHLORIDE 50 MG/1
50 TABLET ORAL EVERY 6 HOURS PRN
Qty: 60 TABLET | Refills: 0 | Status: SHIPPED | OUTPATIENT
Start: 2021-11-22 | End: 2021-12-22 | Stop reason: SDUPTHER

## 2021-11-23 ENCOUNTER — TELEPHONE (OUTPATIENT)
Dept: FAMILY MEDICINE CLINIC | Facility: CLINIC | Age: 86
End: 2021-11-23

## 2021-11-30 ENCOUNTER — TELEPHONE (OUTPATIENT)
Dept: FAMILY MEDICINE CLINIC | Facility: CLINIC | Age: 86
End: 2021-11-30

## 2021-11-30 DIAGNOSIS — R39.9 UTI SYMPTOMS: Primary | ICD-10-CM

## 2021-11-30 RX ORDER — CEPHALEXIN 500 MG/1
500 CAPSULE ORAL EVERY 8 HOURS SCHEDULED
Qty: 21 CAPSULE | Refills: 0 | Status: SHIPPED | OUTPATIENT
Start: 2021-11-30 | End: 2021-12-07

## 2021-12-22 DIAGNOSIS — M19.90 ARTHRITIS: ICD-10-CM

## 2021-12-22 RX ORDER — TRAMADOL HYDROCHLORIDE 50 MG/1
50 TABLET ORAL EVERY 6 HOURS PRN
Qty: 60 TABLET | Refills: 0 | Status: SHIPPED | OUTPATIENT
Start: 2021-12-22 | End: 2022-01-24 | Stop reason: SDUPTHER

## 2022-01-01 NOTE — TELEPHONE ENCOUNTER
[facility]   HISTORY &  PHYSICAL    PATIENT: Jeff Mejía  AGE: 1   : 2022      CHIEF COMPLAINT: circumcision    HPI: 2-3 week old infant.  Parents desire circumcision.    PRESENT MEDICATIONS: vitamins  ALLERGIES: none  PAST MEDICAL/SURGICAL HISTORY: neg  FAMILY HISTORY: neg  REVIEW OF SYSTEMS: unremarkable    PHYSICAL EXAM:  HEENT: neg  EXTREMITIES:  no edema  HEART: RRR, no murmur  NEURO:  neg  LUNGS:  Clear  MUSCULOSKELETAL: neg  : normal male      IMPRESSION: Male infant  PLAN: circumcision   Pt pharmacy called in and wanted to check the dose for the tamiflu she did state the dose that was giving is not the normal dose and she will like to make sure you wanted pt to take medication for only 5 Days and not 10 days please advise

## 2022-01-24 DIAGNOSIS — M19.90 ARTHRITIS: ICD-10-CM

## 2022-01-24 RX ORDER — TRAMADOL HYDROCHLORIDE 50 MG/1
50 TABLET ORAL EVERY 6 HOURS PRN
Qty: 60 TABLET | Refills: 0 | Status: SHIPPED | OUTPATIENT
Start: 2022-01-24 | End: 2022-02-22 | Stop reason: SDUPTHER

## 2022-02-07 DIAGNOSIS — F41.9 ANXIETY: ICD-10-CM

## 2022-02-07 RX ORDER — CLONAZEPAM 1 MG/1
TABLET ORAL
Qty: 45 TABLET | Refills: 1 | Status: SHIPPED | OUTPATIENT
Start: 2022-02-07 | End: 2022-04-08 | Stop reason: SDUPTHER

## 2022-02-07 NOTE — TELEPHONE ENCOUNTER
Per Ed at [facility], Tramadol and Clonazepam are red flagging taken together  Per Dr Char Abreu, it's ok  She's been on them a long time

## 2022-02-22 DIAGNOSIS — M19.90 ARTHRITIS: ICD-10-CM

## 2022-02-22 RX ORDER — TRAMADOL HYDROCHLORIDE 50 MG/1
50 TABLET ORAL EVERY 6 HOURS PRN
Qty: 60 TABLET | Refills: 0 | Status: SHIPPED | OUTPATIENT
Start: 2022-02-22 | End: 2022-03-22 | Stop reason: SDUPTHER

## 2022-03-21 ENCOUNTER — OFFICE VISIT (OUTPATIENT)
Dept: FAMILY MEDICINE CLINIC | Facility: CLINIC | Age: 87
End: 2022-03-21
Payer: COMMERCIAL

## 2022-03-21 ENCOUNTER — APPOINTMENT (OUTPATIENT)
Dept: LAB | Facility: CLINIC | Age: 87
End: 2022-03-21
Payer: COMMERCIAL

## 2022-03-21 VITALS
HEIGHT: 59 IN | WEIGHT: 115 LBS | SYSTOLIC BLOOD PRESSURE: 120 MMHG | BODY MASS INDEX: 23.18 KG/M2 | DIASTOLIC BLOOD PRESSURE: 68 MMHG | RESPIRATION RATE: 16 BRPM | HEART RATE: 88 BPM

## 2022-03-21 DIAGNOSIS — F41.9 ANXIETY: ICD-10-CM

## 2022-03-21 DIAGNOSIS — R30.0 DYSURIA: ICD-10-CM

## 2022-03-21 DIAGNOSIS — E78.5 HYPERLIPIDEMIA, UNSPECIFIED HYPERLIPIDEMIA TYPE: ICD-10-CM

## 2022-03-21 DIAGNOSIS — E05.90 HYPERTHYROIDISM: ICD-10-CM

## 2022-03-21 DIAGNOSIS — M19.90 ARTHRITIS: ICD-10-CM

## 2022-03-21 DIAGNOSIS — I10 PRIMARY HYPERTENSION: ICD-10-CM

## 2022-03-21 DIAGNOSIS — I10 PRIMARY HYPERTENSION: Primary | ICD-10-CM

## 2022-03-21 LAB
ALBUMIN SERPL BCP-MCNC: 3.8 G/DL (ref 3.5–5)
ALP SERPL-CCNC: 55 U/L (ref 46–116)
ALT SERPL W P-5'-P-CCNC: 18 U/L (ref 12–78)
ANION GAP SERPL CALCULATED.3IONS-SCNC: 4 MMOL/L (ref 4–13)
AST SERPL W P-5'-P-CCNC: 14 U/L (ref 5–45)
BASOPHILS # BLD AUTO: 0.05 THOUSANDS/ΜL (ref 0–0.1)
BASOPHILS NFR BLD AUTO: 1 % (ref 0–1)
BILIRUB SERPL-MCNC: 0.45 MG/DL (ref 0.2–1)
BUN SERPL-MCNC: 22 MG/DL (ref 5–25)
CALCIUM SERPL-MCNC: 9.5 MG/DL (ref 8.3–10.1)
CHLORIDE SERPL-SCNC: 106 MMOL/L (ref 100–108)
CHOLEST SERPL-MCNC: 235 MG/DL
CO2 SERPL-SCNC: 28 MMOL/L (ref 21–32)
CREAT SERPL-MCNC: 1.16 MG/DL (ref 0.6–1.3)
CRP SERPL QL: 4.6 MG/L
EOSINOPHIL # BLD AUTO: 0.13 THOUSAND/ΜL (ref 0–0.61)
EOSINOPHIL NFR BLD AUTO: 2 % (ref 0–6)
ERYTHROCYTE [DISTWIDTH] IN BLOOD BY AUTOMATED COUNT: 12.9 % (ref 11.6–15.1)
GFR SERPL CREATININE-BSD FRML MDRD: 42 ML/MIN/1.73SQ M
GLUCOSE SERPL-MCNC: 102 MG/DL (ref 65–140)
HCT VFR BLD AUTO: 39.7 % (ref 34.8–46.1)
HDLC SERPL-MCNC: 56 MG/DL
HGB BLD-MCNC: 13 G/DL (ref 11.5–15.4)
IMM GRANULOCYTES # BLD AUTO: 0.03 THOUSAND/UL (ref 0–0.2)
IMM GRANULOCYTES NFR BLD AUTO: 0 % (ref 0–2)
LDLC SERPL CALC-MCNC: 157 MG/DL (ref 0–100)
LYMPHOCYTES # BLD AUTO: 1.25 THOUSANDS/ΜL (ref 0.6–4.47)
LYMPHOCYTES NFR BLD AUTO: 19 % (ref 14–44)
MCH RBC QN AUTO: 32.3 PG (ref 26.8–34.3)
MCHC RBC AUTO-ENTMCNC: 32.7 G/DL (ref 31.4–37.4)
MCV RBC AUTO: 99 FL (ref 82–98)
MONOCYTES # BLD AUTO: 0.42 THOUSAND/ΜL (ref 0.17–1.22)
MONOCYTES NFR BLD AUTO: 6 % (ref 4–12)
NEUTROPHILS # BLD AUTO: 4.81 THOUSANDS/ΜL (ref 1.85–7.62)
NEUTS SEG NFR BLD AUTO: 72 % (ref 43–75)
NONHDLC SERPL-MCNC: 179 MG/DL
NRBC BLD AUTO-RTO: 0 /100 WBCS
PLATELET # BLD AUTO: 229 THOUSANDS/UL (ref 149–390)
PMV BLD AUTO: 11.2 FL (ref 8.9–12.7)
POTASSIUM SERPL-SCNC: 4.1 MMOL/L (ref 3.5–5.3)
PROT SERPL-MCNC: 7.8 G/DL (ref 6.4–8.2)
RBC # BLD AUTO: 4.02 MILLION/UL (ref 3.81–5.12)
SODIUM SERPL-SCNC: 138 MMOL/L (ref 136–145)
TRIGL SERPL-MCNC: 110 MG/DL
WBC # BLD AUTO: 6.69 THOUSAND/UL (ref 4.31–10.16)

## 2022-03-21 PROCEDURE — 80061 LIPID PANEL: CPT

## 2022-03-21 PROCEDURE — 86430 RHEUMATOID FACTOR TEST QUAL: CPT

## 2022-03-21 PROCEDURE — 1101F PT FALLS ASSESS-DOCD LE1/YR: CPT | Performed by: FAMILY MEDICINE

## 2022-03-21 PROCEDURE — 3725F SCREEN DEPRESSION PERFORMED: CPT | Performed by: FAMILY MEDICINE

## 2022-03-21 PROCEDURE — 86140 C-REACTIVE PROTEIN: CPT

## 2022-03-21 PROCEDURE — 86618 LYME DISEASE ANTIBODY: CPT

## 2022-03-21 PROCEDURE — 85025 COMPLETE CBC W/AUTO DIFF WBC: CPT

## 2022-03-21 PROCEDURE — 36415 COLL VENOUS BLD VENIPUNCTURE: CPT

## 2022-03-21 PROCEDURE — 3288F FALL RISK ASSESSMENT DOCD: CPT | Performed by: FAMILY MEDICINE

## 2022-03-21 PROCEDURE — 1160F RVW MEDS BY RX/DR IN RCRD: CPT | Performed by: FAMILY MEDICINE

## 2022-03-21 PROCEDURE — 80053 COMPREHEN METABOLIC PANEL: CPT

## 2022-03-21 PROCEDURE — 99214 OFFICE O/P EST MOD 30 MIN: CPT | Performed by: FAMILY MEDICINE

## 2022-03-21 PROCEDURE — 1036F TOBACCO NON-USER: CPT | Performed by: FAMILY MEDICINE

## 2022-03-21 PROCEDURE — 86038 ANTINUCLEAR ANTIBODIES: CPT

## 2022-03-21 RX ORDER — NITROFURANTOIN 25; 75 MG/1; MG/1
100 CAPSULE ORAL 2 TIMES DAILY
Qty: 10 CAPSULE | Refills: 0 | Status: SHIPPED | OUTPATIENT
Start: 2022-03-21 | End: 2022-03-26

## 2022-03-21 NOTE — PROGRESS NOTES
Assessment and Plan:    Problem List Items Addressed This Visit        Endocrine    Hyperthyroidism     Await lab            Cardiovascular and Mediastinum    Hypertension - Primary     BP stable         Relevant Orders    Comprehensive metabolic panel       Musculoskeletal and Integument    Arthritis     Only partial relief with tramadol         Relevant Orders    CBC and differential    GIOVANNI Screen w/ Reflex to Titer/Pattern    RF Screen w/ Reflex to Titer    Lyme Antibody Profile with reflex to WB    C-reactive protein       Other    Anxiety     Stable on meds         Hyperlipidemia     Await  lab         Relevant Orders    Lipid panel      Other Visit Diagnoses     Dysuria        Relevant Medications    nitrofurantoin (MACROBID) 100 mg capsule                 Diagnoses and all orders for this visit:    Primary hypertension  -     Comprehensive metabolic panel; Future    Hyperthyroidism    Hyperlipidemia, unspecified hyperlipidemia type  -     Lipid panel; Future    Arthritis  -     CBC and differential; Future  -     GIOVANNI Screen w/ Reflex to Titer/Pattern; Future  -     RF Screen w/ Reflex to Titer; Future  -     Lyme Antibody Profile with reflex to WB; Future  -     C-reactive protein; Future    Anxiety    Dysuria  -     nitrofurantoin (MACROBID) 100 mg capsule; Take 1 capsule (100 mg total) by mouth 2 (two) times a day for 5 days              Subjective:      Patient ID: Luz Maria Jenkins is a 80 y o  female  CC:    Chief Complaint   Patient presents with    Follow-up     pt here for a follow up   Rcruz       HPI:    F/u lipids, arthritis, anxiety, doesn't feel like Tramadol really working for pain, due for lab, getting really  Hard to get around,still with odor to urine-got Keflex in 11/21, would like something else, unable to give urine      The following portions of the patient's history were reviewed and updated as appropriate: allergies, current medications, past family history, past medical history, past social history, past surgical history and problem list       Review of Systems   Constitutional: Negative for activity change, appetite change and fatigue  Respiratory: Negative for shortness of breath  Cardiovascular: Negative for chest pain  Genitourinary: Positive for dysuria  Musculoskeletal: Positive for arthralgias and myalgias  Neurological: Negative for dizziness and headaches  Psychiatric/Behavioral: The patient is not nervous/anxious  Data to review:       Objective:    Vitals:    03/21/22 1103   BP: 120/68   BP Location: Left arm   Patient Position: Sitting   Cuff Size: Standard   Pulse: 88   Resp: 16   Weight: 52 2 kg (115 lb)   Height: 4' 11" (1 499 m)        Physical Exam  Vitals reviewed  Constitutional:       Appearance: Normal appearance  Cardiovascular:      Rate and Rhythm: Normal rate and regular rhythm  Pulses: Normal pulses  Heart sounds: Normal heart sounds  Pulmonary:      Effort: Pulmonary effort is normal       Breath sounds: Normal breath sounds  Musculoskeletal:      Right lower leg: No edema  Left lower leg: No edema  Lymphadenopathy:      Cervical: No cervical adenopathy  Neurological:      Mental Status: She is alert     Psychiatric:         Mood and Affect: Mood normal

## 2022-03-22 ENCOUNTER — APPOINTMENT (OUTPATIENT)
Dept: LAB | Facility: CLINIC | Age: 87
End: 2022-03-22
Payer: COMMERCIAL

## 2022-03-22 DIAGNOSIS — M19.90 ARTHRITIS: ICD-10-CM

## 2022-03-22 LAB
B BURGDOR IGG+IGM SER-ACNC: 7
RHEUMATOID FACT SER QL LA: NEGATIVE

## 2022-03-22 PROCEDURE — 80307 DRUG TEST PRSMV CHEM ANLYZR: CPT | Performed by: FAMILY MEDICINE

## 2022-03-22 RX ORDER — TRAMADOL HYDROCHLORIDE 50 MG/1
50 TABLET ORAL EVERY 8 HOURS PRN
Qty: 60 TABLET | Refills: 0 | Status: SHIPPED | OUTPATIENT
Start: 2022-03-22 | End: 2022-03-24

## 2022-03-23 LAB
LABORATORY COMMENT REPORT: ABNORMAL
RYE IGE QN: NEGATIVE
TRAMADOL UR QL SCN: POSITIVE NG/ML

## 2022-03-24 ENCOUNTER — TELEPHONE (OUTPATIENT)
Dept: FAMILY MEDICINE CLINIC | Facility: CLINIC | Age: 87
End: 2022-03-24

## 2022-03-24 DIAGNOSIS — M19.90 ARTHRITIS: Primary | ICD-10-CM

## 2022-03-24 RX ORDER — ACETAMINOPHEN AND CODEINE PHOSPHATE 300; 30 MG/1; MG/1
1 TABLET ORAL EVERY 8 HOURS PRN
Qty: 90 TABLET | Refills: 0 | Status: SHIPPED | OUTPATIENT
Start: 2022-03-24 | End: 2022-04-08 | Stop reason: CLARIF

## 2022-03-24 NOTE — TELEPHONE ENCOUNTER
----- Message from Hill Ritchie MD sent at 3/23/2022  1:46 PM EDT -----  No underlying reason for arthritis-would pt like to change to Tylenol with codeine when runs out of Tramadol next month?

## 2022-03-24 NOTE — TELEPHONE ENCOUNTER
Patient notified of lab results and recommendations  Pt is willing to try Tylenol with codeine as suggested below

## 2022-04-04 ENCOUNTER — TELEPHONE (OUTPATIENT)
Dept: FAMILY MEDICINE CLINIC | Facility: CLINIC | Age: 87
End: 2022-04-04

## 2022-04-04 NOTE — TELEPHONE ENCOUNTER
Pt has been taking Tylenol #3 for her arthritis since Thursday 3/31  She takes it 3 times a day  Pt states the medication does not help  She is asking what Dr Sharyle Darner suggests now?

## 2022-04-04 NOTE — TELEPHONE ENCOUNTER
Pt states she would like to go back to tramadol 50 mg, states she does have at home  States taking two a day did not work  How many can she take of this medication a day as she does not want to do Tylenol #3 four times daily

## 2022-04-07 ENCOUNTER — TELEPHONE (OUTPATIENT)
Dept: FAMILY MEDICINE CLINIC | Facility: CLINIC | Age: 87
End: 2022-04-07

## 2022-04-07 DIAGNOSIS — F41.9 ANXIETY: ICD-10-CM

## 2022-04-07 NOTE — TELEPHONE ENCOUNTER
MF, Pt called requesting a refill for her Tramadol 50 mg but I do not see this on her med list anymore, Is pt still taking this?

## 2022-04-08 RX ORDER — CLONAZEPAM 1 MG/1
TABLET ORAL
Qty: 45 TABLET | Refills: 1 | Status: SHIPPED | OUTPATIENT
Start: 2022-04-08 | End: 2022-06-07 | Stop reason: SDUPTHER

## 2022-04-08 RX ORDER — TRAMADOL HYDROCHLORIDE 50 MG/1
50 TABLET ORAL 3 TIMES DAILY
Qty: 90 TABLET | Refills: 1 | Status: CANCELLED | OUTPATIENT
Start: 2022-04-08

## 2022-04-08 NOTE — TELEPHONE ENCOUNTER
Last rx for Tramadol 3/23-should have enough-take 1 twice per day and 1/2 for 3rd dose and see how she does with that, rx sent for Clonazepam

## 2022-04-08 NOTE — TELEPHONE ENCOUNTER
Pt tried Tylenol with codeine instead but it did not work for arthritis-does she want 3 times/day of Trmaadol? 80

## 2022-04-08 NOTE — TELEPHONE ENCOUNTER
Patient called back advising she need the Tramadol 50mg 3x a day and her clonazepam 1mg tab sent to [address] [place] [address] [place]

## 2022-04-12 NOTE — TELEPHONE ENCOUNTER
Spoke to patient she advises she takes 3 tramadol a day and it has helped with sxs  Pt advises she has enough Tramadol medication

## 2022-04-21 ENCOUNTER — TELEPHONE (OUTPATIENT)
Dept: FAMILY MEDICINE CLINIC | Facility: CLINIC | Age: 87
End: 2022-04-21

## 2022-04-21 DIAGNOSIS — M19.90 ARTHRITIS: Primary | ICD-10-CM

## 2022-04-21 RX ORDER — TRAMADOL HYDROCHLORIDE 50 MG/1
50 TABLET ORAL EVERY 8 HOURS PRN
Qty: 90 TABLET | Refills: 0 | Status: SHIPPED | OUTPATIENT
Start: 2022-04-21 | End: 2022-05-19 | Stop reason: SDUPTHER

## 2022-04-28 DIAGNOSIS — F41.9 ANXIETY: ICD-10-CM

## 2022-04-28 RX ORDER — BUSPIRONE HYDROCHLORIDE 5 MG/1
5 TABLET ORAL 2 TIMES DAILY
Qty: 60 TABLET | Refills: 5 | Status: SHIPPED | OUTPATIENT
Start: 2022-04-28

## 2022-05-19 ENCOUNTER — TELEPHONE (OUTPATIENT)
Dept: FAMILY MEDICINE CLINIC | Facility: CLINIC | Age: 87
End: 2022-05-19

## 2022-05-19 DIAGNOSIS — M19.90 ARTHRITIS: ICD-10-CM

## 2022-05-19 RX ORDER — TRAMADOL HYDROCHLORIDE 50 MG/1
50 TABLET ORAL EVERY 8 HOURS PRN
Qty: 90 TABLET | Refills: 0 | Status: SHIPPED | OUTPATIENT
Start: 2022-05-19 | End: 2022-06-22 | Stop reason: SDUPTHER

## 2022-06-07 DIAGNOSIS — F41.9 ANXIETY: ICD-10-CM

## 2022-06-07 RX ORDER — CLONAZEPAM 1 MG/1
TABLET ORAL
Qty: 45 TABLET | Refills: 1 | Status: SHIPPED | OUTPATIENT
Start: 2022-06-07 | End: 2022-08-08 | Stop reason: SDUPTHER

## 2022-06-21 ENCOUNTER — TELEPHONE (OUTPATIENT)
Dept: FAMILY MEDICINE CLINIC | Facility: CLINIC | Age: 87
End: 2022-06-21

## 2022-06-21 NOTE — TELEPHONE ENCOUNTER
Please notify patient that to continue her opioid therapy she must come in    This is a state mandated and Dr Adams Choi has to follow the mandate to continue opioid therapy

## 2022-06-21 NOTE — TELEPHONE ENCOUNTER
PATIENT IS ASKING TO CANCEL HER OPIOID VISIT ON 07/01/2022, STATES SHE IS SEEING DR Vanessa Hunter AS HOUSE CALL IN September AND IT IS TOO HARD FOR HER TO GET IN AND OUT OF HER SON'S VAN TO COME IN July  I DID NOT CANCEL APPOINTMENT - WAITING FOR DR HOLT'S RESPONSE       PLEASE CALL PATIENT

## 2022-06-21 NOTE — TELEPHONE ENCOUNTER
Patient was advised of the below recommendation from TS  Pt understood and will keep appointment since she needs her pain meds

## 2022-06-22 DIAGNOSIS — M19.90 ARTHRITIS: ICD-10-CM

## 2022-06-24 RX ORDER — TRAMADOL HYDROCHLORIDE 50 MG/1
50 TABLET ORAL EVERY 8 HOURS PRN
Qty: 90 TABLET | Refills: 0 | Status: SHIPPED | OUTPATIENT
Start: 2022-06-24 | End: 2022-07-25 | Stop reason: SDUPTHER

## 2022-07-01 ENCOUNTER — OFFICE VISIT (OUTPATIENT)
Dept: FAMILY MEDICINE CLINIC | Facility: CLINIC | Age: 87
End: 2022-07-01
Payer: COMMERCIAL

## 2022-07-01 VITALS — HEIGHT: 59 IN | BODY MASS INDEX: 23.23 KG/M2

## 2022-07-01 DIAGNOSIS — F41.9 ANXIETY: ICD-10-CM

## 2022-07-01 DIAGNOSIS — M19.90 ARTHRITIS: Primary | ICD-10-CM

## 2022-07-01 DIAGNOSIS — F11.20 CONTINUOUS OPIOID DEPENDENCE (HCC): ICD-10-CM

## 2022-07-01 PROCEDURE — 99214 OFFICE O/P EST MOD 30 MIN: CPT | Performed by: FAMILY MEDICINE

## 2022-07-01 PROCEDURE — 1160F RVW MEDS BY RX/DR IN RCRD: CPT | Performed by: FAMILY MEDICINE

## 2022-07-01 NOTE — PATIENT INSTRUCTIONS
madeline Baca signed  Goals of care:  Maximize your health and quality of life by: Increasing your level of function and activity  Decreasing the negative effects of pain on your life  Minimizing the risks and side effects of medications and ensuring safe use of opioid medication     Ways for you to help meet your goals:  Maintain a healthy lifestyle  This includes proper nutrition, regular physical activity as able, try for 8 hours of sleep per night, use stress reduction strategies, avoid triggers  Risks and side effects of opioid use:  Prescription opioids carry serious risks of addiction and  overdose, especially with prolonged use  An opioid overdose,  often marked by slowed breathing, can cause sudden death  The  use of prescription opioids can have a number of side effects as  well, even when taken as directed: Tolerance--meaning you might need to take more of a medication for the same pain relief  Physical dependence--meaning you have symptoms of withdrawal when a medication is stopped  Increased sensitivity to pain  Constipation  Nausea, vomiting, dry mouth  Sleepiness and dizziness   Confusion  Depression  Low levels of testosterone that can result in lower sex drive, energy, and strength  Itching and sweating    If you are prescribed opioids for pain:  Never take opioids in greater amounts or more often than prescribed  Help prevent misuse and abuse  - Never sell or share prescription opioids         - Never use another persons prescription opioids  Store prescription opioids in a secure place and out of reach of others (this may include visitors, children, friends, and family)  Safely dispose of unused prescription opioids: Find your community drug take-back program or your pharmacy mail-back program, or flush them down the toilet, following guidance from the Food and Drug Administration (www fda gov/Drugs/ResourcesForYou)    Visit www cdc gov/drugoverdose to learn about the risks of opioid abuse and overdose  If you believe you may be struggling with addiction, tell your health care provider and ask for guidance or call [address] at 4-095-117-KFDN

## 2022-07-01 NOTE — PROGRESS NOTES
Assessment/Plan     Problem List Items Addressed This Visit        Musculoskeletal and Integument    Arthritis - Primary    Relevant Orders    [facility] All Prescribed Meds    Amphetamines, Methamphetamines    Butalbital    Phenobarbital    Secobarbital    Temazepam    Alprazolam    Clonazepam    Diazepam    Lorazepam    Oxazepam    Gabapentin    Pregabalin    Cocaine    Heroin    Buprenorphine    Levorphanol    Meperidine    Naltrexone    Fentanyl    Methadone    Oxycodone    Oxymorphone    Tapentadol    THC    Tramadol    Codeine, Hydrocodone, Hydropmorphone, Morphine    Bath Salts    Ethyl Glucuronide/Ethyl Sulfate    Kratom    Spice    Methylphenidate    Phentermine    Validity Oxidant    Validity Creatinine    Validity pH    Validity Specific       Other    Anxiety    Relevant Orders    [facility] All Prescribed Meds    Amphetamines, Methamphetamines    Butalbital    Phenobarbital    Secobarbital    Temazepam    Alprazolam    Clonazepam    Diazepam    Lorazepam    Oxazepam    Gabapentin    Pregabalin    Cocaine    Heroin    Buprenorphine    Levorphanol    Meperidine    Naltrexone    Fentanyl    Methadone    Oxycodone    Oxymorphone    Tapentadol    THC    Tramadol    Codeine, Hydrocodone, Hydropmorphone, Morphine    Bath Salts    Ethyl Glucuronide/Ethyl Sulfate    Kratom    Spice    Methylphenidate    Phentermine    Validity Oxidant    Validity Creatinine    Validity pH    Validity Specific      Other Visit Diagnoses     Continuous opioid dependence ([facility] [address] )        Relevant Orders    [facility] All Prescribed Meds    Amphetamines, Methamphetamines    Butalbital    Phenobarbital    Secobarbital    Temazepam    Alprazolam    Clonazepam    Diazepam    Lorazepam    Oxazepam    Gabapentin    Pregabalin    Cocaine    Heroin    Buprenorphine    Levorphanol    Meperidine    Naltrexone    Fentanyl    Methadone    Oxycodone    Oxymorphone    Tapentadol    THC    Tramadol    Codeine, Hydrocodone, Hydropmorphone, Morphine Bath Salts    Ethyl Glucuronide/Ethyl Sulfate    Kratom    Spice    Methylphenidate    Phentermine    Validity Oxidant    Validity Creatinine    Validity pH    Validity Specific         Treatment Plan: rec copper knee brace    Treatment Goals: Decrease pain, increase functional capacity    Opiate risks  There are risks associated with opioid medications, including dependence, addiction and tolerance  The patient understands and agrees to use these medications only as prescribed  Potential side effects of the medications include, but are not limited to, constipation, drowsiness, addiction, impaired judgment and risk of fatal overdose if not taken as prescribed  The patient was warned against driving while taking sedation medications  Sharing medications is a felony  At this point in time, the patient is showing no signs of addiction, abuse, diversion or suicidal ideation  Pateint is taking concurrent benzodiazepines  Has been counseled on the risks of taking opioids and benzodiazepines including sedation, increased fall risk, dizziness, addictive potential and death  Opioid agreement  Pain management agreement was reviewed with patient and signed/updated during visit      Drug screen  Drug screen collected during today's visit      PD[place] review  [place]  reviewed   No red flags were identified; safe to proceed with prescription          Subjective     Opioid Management:   Type of visit: Initial    Arthritis for years, due to advanced  Age can't tale NSAIDs, tolerates low dose Tramadol is wheelchair bound out in public, uses walker at home    Current pain description: 6-7/10 good day, bad day 10/10, pain the worse in knees and legs    Functional status: Able to do activities of daily living with meds    Goals of care: Decrease pain, increase functional capacity    Social Support System  Patient receives support from their: son    Screening Tools/Assessments:    PHQ-2/9:  Last PHQ-2 score: 0 (Last PHQ-2 date: 3/21/2022)    Opioid Risk Tool (ORT):  Current ORT Score: 0 (low risk for opiate abuse)    SOAPP:  Current SOAPP Score: 0 (negative, low risk patient)    Brief Pain Inventory (BPI):  1) Throughout our lives, most of us have had pain from time to time (such as minor headaches, sprains, and toothaches)  Have you had pain other than these everyday kinds of pain today? No  2) Where is your pain located? Lower back, bilateral shoulder,forearm, hands, knee and foot  3) Rate your pain at its worst in the last 24 hours: 7  4) Rate your pain at its least in the last 24 hours: 7  5) Rate your average level of pain: 7  6) Rate your pain right now: 7  7) What treatments or medications are you receiving for your pain? Tramadol  8) In the past 24 hours, how much relief have pain treatments or medication provided?  60%  9) During the past 24 hours, pain has interfered with your:     A) General activity: 7     B) Mood: 6     C) Walking ability: 8     D) Normal work (work outside the home & housework): 7     E) Relations with other people: 5     F) Sleep: 5     G) Enjoyment of life: 5    Drug Screen:  Last drug screen was performed more than 365 days ago  Drug screen result based off current prescriptions: consistent    Opioid agreement:  Active Opioid agreement on file?: Yes    Opioid agreement signed date: 9/8/2021  Opioid agreement expiration date: 9/8/2022    Naloxone:  Currently prescribed Naloxone (Narcan): Yes      Other treatments tried/failed:   Rest, heat, cold compresses, topical patches (OTC), topical creams (OTC), narcotic analgesics and home exercises    Narcotic analgesics tried/failed: tramadol    Prior referrals:  Physical therapy and Orthopedics    F/u chronic pain knees and legs hurt    Pain Medications             aspirin 81 MG tablet Take 1 tablet by mouth daily    clonazePAM (KlonoPIN) 1 mg tablet 1/2 po tid prn anxiety    traMADol (Ultram) 50 mg tablet Take 1 tablet (50 mg total) by mouth every 8 (eight) hours as needed for moderate pain         Outpatient Morphine Milligram Equivalents Per Day     7/1/22 and after 15 MME/Day    Order Name Dose Route Frequency Maximum MME/Day     traMADol (Ultram) 50 mg tablet 50 mg Oral Every 8 hours PRN 15 MME/Day    Total Potential Morphine Milligram Equivalents Per Day 15 MME/Day    Calculation Information        traMADol (Ultram) 50 mg tablet    traMADol 50 mg Tabs: single dose of 50 mg * 3 doses per day * morphine equivalence factor of 0 1 = 15 MME/Day                         PDMP Review       Value Time User    PDMP Reviewed  Yes 6/24/2022  6:29 PM Ashish Amado MD         Review of Systems   Constitutional: Negative for activity change, appetite change and fatigue  Respiratory: Negative for cough  Cardiovascular: Negative for chest pain  Musculoskeletal: Positive for arthralgias and myalgias  Neurological: Negative for dizziness and headaches  Psychiatric/Behavioral: The patient is nervous/anxious  Objective     Ht 4' 11" (1 499 m)   BMI 23 23 kg/m²     Physical Exam  Vitals reviewed  Constitutional:       Appearance: Normal appearance  Cardiovascular:      Rate and Rhythm: Normal rate and regular rhythm  Pulses: Normal pulses  Heart sounds: Normal heart sounds  Pulmonary:      Effort: Pulmonary effort is normal       Breath sounds: Normal breath sounds  Musculoskeletal:      Right knee: Crepitus present  Tenderness present over the medial joint line  Left knee: Crepitus present  Tenderness present over the medial joint line  Lymphadenopathy:      Cervical: No cervical adenopathy  Neurological:      Mental Status: She is alert           Ashish Amado MD

## 2022-07-08 ENCOUNTER — TELEPHONE (OUTPATIENT)
Dept: FAMILY MEDICINE CLINIC | Facility: CLINIC | Age: 87
End: 2022-07-08

## 2022-07-08 LAB
6MAM UR QL CFM: NEGATIVE NG/ML
7AMINOCLONAZEPAM UR QL CFM: NORMAL NG/ML
A-OH ALPRAZ UR QL CFM: NEGATIVE NG/ML
ACCEPTABLE CREAT UR QL: NORMAL MG/DL
ACCEPTIBLE SP GR UR QL: NORMAL
AMPHET UR QL CFM: NEGATIVE NG/ML
BUPRENORPHINE UR QL CFM: NEGATIVE NG/ML
BUTALBITAL UR QL CFM: NEGATIVE NG/ML
BZE UR QL CFM: NEGATIVE NG/ML
CODEINE UR QL CFM: NEGATIVE NG/ML
EDDP UR QL CFM: NEGATIVE NG/ML
ETHYL GLUCURONIDE UR QL CFM: NEGATIVE NG/ML
ETHYL SULFATE UR QL SCN: NEGATIVE NG/ML
EUTYLONE UR QL: NEGATIVE NG/ML
FENTANYL UR QL CFM: NEGATIVE NG/ML
GLIADIN IGG SER IA-ACNC: NEGATIVE NG/ML
HYDROCODONE UR QL CFM: NEGATIVE NG/ML
HYDROMORPHONE UR QL CFM: NEGATIVE NG/ML
LORAZEPAM UR QL CFM: NEGATIVE NG/ML
ME-PHENIDATE UR QL CFM: NEGATIVE NG/ML
MEPERIDINE UR QL CFM: NEGATIVE NG/ML
METHADONE UR QL CFM: NEGATIVE NG/ML
METHAMPHET UR QL CFM: NEGATIVE NG/ML
MORPHINE UR QL CFM: NEGATIVE NG/ML
NALTREXONE UR QL CFM: NEGATIVE NG/ML
NITRITE UR QL: NORMAL UG/ML
NORBUPRENORPHINE UR QL CFM: NEGATIVE NG/ML
NORDIAZEPAM UR QL CFM: NEGATIVE NG/ML
NORFENTANYL UR QL CFM: NEGATIVE NG/ML
NORHYDROCODONE UR QL CFM: NEGATIVE NG/ML
NORMEPERIDINE UR QL CFM: NEGATIVE NG/ML
NOROXYCODONE UR QL CFM: NEGATIVE NG/ML
OXAZEPAM UR QL CFM: NEGATIVE NG/ML
OXYCODONE UR QL CFM: NEGATIVE NG/ML
OXYMORPHONE UR QL CFM: NEGATIVE NG/ML
OXYMORPHONE UR QL CFM: NEGATIVE NG/ML
PHENOBARB UR QL CFM: NEGATIVE NG/ML
RESULT ALL_PRESCRIBED MEDS AND SPECIAL INSTRUCTIONS: NORMAL
SECOBARBITAL UR QL CFM: NEGATIVE NG/ML
SL AMB 3-METHYL-FENTANYL QUANTIFICATION: NORMAL NG/ML
SL AMB 4-ANPP QUANTIFICATION: NORMAL NG/ML
SL AMB 4-FIBF QUANTIFICATION: NORMAL NG/ML
SL AMB 5F-ADB-M7 METABOLITE QUANTIFICATION: NEGATIVE NG/ML
SL AMB 7-OH-MITRAGYNINE (KRATOM ALKALOID) QUANTIFICATION: NEGATIVE NG/ML
SL AMB AB-FUBINACA-M3 METABOLITE QUANTIFICATION: NEGATIVE NG/ML
SL AMB ACETYL FENTANYL QUANTIFICATION: NORMAL NG/ML
SL AMB ACETYL NORFENTANYL QUANTIFICATION: NORMAL NG/ML
SL AMB ACRYL FENTANYL QUANTIFICATION: NORMAL NG/ML
SL AMB BUTRYL FENTANYL QUANTIFICATION: NORMAL NG/ML
SL AMB CARFENTANIL QUANTIFICATION: NORMAL NG/ML
SL AMB CTHC (MARIJUANA METABOLITE) QUANTIFICATION: NEGATIVE NG/ML
SL AMB CYCLOPROPYL FENTANYL QUANTIFICATION: NORMAL NG/ML
SL AMB DEXTRORPHAN (DEXTROMETHORPHAN METABOLITE) QUANT: NEGATIVE NG/ML
SL AMB FURANYL FENTANYL QUANTIFICATION: NORMAL NG/ML
SL AMB GABAPENTIN QUANTIFICATION: NEGATIVE
SL AMB JWH018 METABOLITE QUANTIFICATION: NEGATIVE NG/ML
SL AMB JWH073 METABOLITE QUANTIFICATION: NEGATIVE NG/ML
SL AMB MDMB-FUBINACA-M1 METABOLITE QUANTIFICATION: NEGATIVE NG/ML
SL AMB METHOXYACETYL FENTANYL QUANTIFICATION: NORMAL NG/ML
SL AMB METHYLONE QUANTIFICATION: NEGATIVE NG/ML
SL AMB N-DESMETHYL U-47700 QUANTIFICATION: NORMAL NG/ML
SL AMB N-DESMETHYL-TRAMADOL QUANTIFICATION: NORMAL NG/ML
SL AMB PHENTERMINE QUANTIFICATION: NEGATIVE NG/ML
SL AMB PREGABALIN QUANTIFICATION: NEGATIVE
SL AMB RCS4 METABOLITE QUANTIFICATION: NEGATIVE NG/ML
SL AMB RITALINIC ACID QUANTIFICATION: NEGATIVE NG/ML
SL AMB U-47700 QUANTIFICATION: NORMAL NG/ML
SMOOTH MUSCLE AB TITR SER IF: NEGATIVE NG/ML
SPECIMEN DRAWN SERPL: NEGATIVE NG/ML
SPECIMEN PH ACCEPTABLE UR: NORMAL
TAPENTADOL UR QL CFM: NEGATIVE NG/ML
TEMAZEPAM UR QL CFM: NEGATIVE NG/ML
TEMAZEPAM UR QL CFM: NEGATIVE NG/ML
TRAMADOL UR QL CFM: NORMAL NG/ML
URATE/CREAT 24H UR: NORMAL NG/ML

## 2022-07-08 NOTE — TELEPHONE ENCOUNTER
----- Message from Concepcion Salgado MD sent at 7/8/2022  9:15 AM EDT -----  Urine consistent with prescribed medications

## 2022-07-25 DIAGNOSIS — M19.90 ARTHRITIS: ICD-10-CM

## 2022-07-25 RX ORDER — TRAMADOL HYDROCHLORIDE 50 MG/1
50 TABLET ORAL EVERY 8 HOURS PRN
Qty: 90 TABLET | Refills: 0 | Status: SHIPPED | OUTPATIENT
Start: 2022-07-25 | End: 2022-08-18 | Stop reason: SDUPTHER

## 2022-07-25 NOTE — TELEPHONE ENCOUNTER
Last OV with Office: 7/1/2022   Last visit with PCP : Visit date not found      Next visit with the Office :9/22/2022   Next visit with PCP : Visit date not found    Pt is currently out of medication

## 2022-08-08 DIAGNOSIS — F41.9 ANXIETY: ICD-10-CM

## 2022-08-08 RX ORDER — CLONAZEPAM 1 MG/1
TABLET ORAL
Qty: 45 TABLET | Refills: 1 | Status: SHIPPED | OUTPATIENT
Start: 2022-08-08 | End: 2022-10-05 | Stop reason: SDUPTHER

## 2022-08-08 NOTE — TELEPHONE ENCOUNTER
Last OV with Office: 7/1/2022   Last visit with PCP : 7/1/2022      Next visit with the Office :9/22/2022   Next visit with PCP : Visit date not found

## 2022-08-18 DIAGNOSIS — M19.90 ARTHRITIS: ICD-10-CM

## 2022-08-18 RX ORDER — TRAMADOL HYDROCHLORIDE 50 MG/1
50 TABLET ORAL EVERY 8 HOURS PRN
Qty: 90 TABLET | Refills: 0 | Status: SHIPPED | OUTPATIENT
Start: 2022-08-18 | End: 2022-09-22 | Stop reason: SDUPTHER

## 2022-08-22 NOTE — TELEPHONE ENCOUNTER
Per pt she did not realize she had called in to soon and apologizes  I reassured pt she can  the meds on 8/24

## 2022-08-23 ENCOUNTER — TELEPHONE (OUTPATIENT)
Dept: FAMILY MEDICINE CLINIC | Facility: CLINIC | Age: 87
End: 2022-08-23

## 2022-08-23 NOTE — TELEPHONE ENCOUNTER
Ed from [address] advised that [facility] don't like the combination of tramadol with clonazepam  Pharmacist wanted to confirm is ok for pt to be taking both medications  Pharmacist was advised pt has been on tramadol medication since 2015

## 2022-09-07 ENCOUNTER — TELEPHONE (OUTPATIENT)
Dept: FAMILY MEDICINE CLINIC | Facility: CLINIC | Age: 87
End: 2022-09-07

## 2022-09-14 ENCOUNTER — RA CDI HCC (OUTPATIENT)
Dept: OTHER | Facility: HOSPITAL | Age: 87
End: 2022-09-14

## 2022-09-14 NOTE — PROGRESS NOTES
[facility]  coding opportunities       Chart reviewed, no opportunity found: CHART REVIEWED, NO OPPORTUNITY FOUND        Patients Insurance     Medicare Insurance: [employer] [facility] Advantage

## 2022-09-22 ENCOUNTER — OFFICE VISIT (OUTPATIENT)
Dept: FAMILY MEDICINE CLINIC | Facility: CLINIC | Age: 87
End: 2022-09-22
Payer: COMMERCIAL

## 2022-09-22 VITALS — DIASTOLIC BLOOD PRESSURE: 64 MMHG | SYSTOLIC BLOOD PRESSURE: 110 MMHG | HEART RATE: 64 BPM

## 2022-09-22 DIAGNOSIS — E05.90 HYPERTHYROIDISM: ICD-10-CM

## 2022-09-22 DIAGNOSIS — M19.90 ARTHRITIS: ICD-10-CM

## 2022-09-22 DIAGNOSIS — D32.9 MENINGIOMA (HCC): ICD-10-CM

## 2022-09-22 DIAGNOSIS — E78.5 HYPERLIPIDEMIA, UNSPECIFIED HYPERLIPIDEMIA TYPE: ICD-10-CM

## 2022-09-22 DIAGNOSIS — Z00.00 MEDICARE ANNUAL WELLNESS VISIT, SUBSEQUENT: ICD-10-CM

## 2022-09-22 DIAGNOSIS — N18.32 STAGE 3B CHRONIC KIDNEY DISEASE (HCC): Primary | ICD-10-CM

## 2022-09-22 DIAGNOSIS — F41.9 ANXIETY: ICD-10-CM

## 2022-09-22 DIAGNOSIS — N39.46 MIXED STRESS AND URGE URINARY INCONTINENCE: ICD-10-CM

## 2022-09-22 PROCEDURE — 99349 HOME/RES VST EST MOD MDM 40: CPT | Performed by: FAMILY MEDICINE

## 2022-09-22 PROCEDURE — 1101F PT FALLS ASSESS-DOCD LE1/YR: CPT | Performed by: FAMILY MEDICINE

## 2022-09-22 PROCEDURE — 1125F AMNT PAIN NOTED PAIN PRSNT: CPT | Performed by: FAMILY MEDICINE

## 2022-09-22 PROCEDURE — 3725F SCREEN DEPRESSION PERFORMED: CPT | Performed by: FAMILY MEDICINE

## 2022-09-22 PROCEDURE — 1160F RVW MEDS BY RX/DR IN RCRD: CPT | Performed by: FAMILY MEDICINE

## 2022-09-22 PROCEDURE — G0439 PPPS, SUBSEQ VISIT: HCPCS | Performed by: FAMILY MEDICINE

## 2022-09-22 PROCEDURE — 1170F FXNL STATUS ASSESSED: CPT | Performed by: FAMILY MEDICINE

## 2022-09-22 PROCEDURE — 3288F FALL RISK ASSESSMENT DOCD: CPT | Performed by: FAMILY MEDICINE

## 2022-09-22 RX ORDER — TRAMADOL HYDROCHLORIDE 50 MG/1
50 TABLET ORAL EVERY 8 HOURS PRN
Qty: 90 TABLET | Refills: 0 | Status: SHIPPED | OUTPATIENT
Start: 2022-09-22 | End: 2022-10-17 | Stop reason: SDUPTHER

## 2022-09-22 RX ORDER — NAPROXEN 375 MG/1
375 TABLET ORAL 2 TIMES DAILY WITH MEALS
Qty: 60 TABLET | Refills: 2 | Status: SHIPPED | OUTPATIENT
Start: 2022-09-22 | End: 2022-10-05

## 2022-09-22 NOTE — PROGRESS NOTES
Assessment and Plan:     Problem List Items Addressed This Visit        Endocrine    Hyperthyroidism     Await lab         Relevant Orders    TSH, 3rd generation       Nervous and Auditory    Meningioma ([facility] [address] )     Asymptomatic, declines  Follow up            Musculoskeletal and Integument    Arthritis     Sx worse , will add low dose Naprosyn to Tramadol         Relevant Medications    traMADol (Ultram) 50 mg tablet    naproxen (NAPROSYN) 375 mg tablet       Genitourinary    Stage 3b chronic kidney disease ([facility] [address] ) - Primary     Lab Results   Component Value Date    EGFR 42 03/21/2022    CREATININE 1 16 03/21/2022    CREATININE 1 12 11/25/2015    CREATININE 0 99 10/21/2015   recheck kidney function after starting Naprosyn         Relevant Orders    Basic metabolic panel       Other    Anxiety     Stable on low dose Clonazepam         Hyperlipidemia     Up a tad, pt  Declines treatment, doesn't want"morepills"           Other Visit Diagnoses     Medicare annual wellness visit, subsequent        Mixed stress and urge urinary incontinence               Preventive health issues were discussed with patient, and age appropriate screening tests were ordered as noted in patient's After Visit Summary  Personalized health advice and appropriate referrals for health education or preventive services given if needed, as noted in patient's After Visit Summary       History of Present Illness:     Patient presents for a Medicare Wellness Visit    F/u chronic pain, discussed whether she  Should take 3  Or 4 Tramadol/day, would like to try antiinflammatory, last GFR  42, says everything  Hurts-walking quite slowly with walker, other issue is bladder control, feels she can't get there in time due to arthritis  So wearing Depends, no other acute compalints, does have slight tremor of face and hand     Patient Care Team:  Rhoda Mcbride MD as PCP - Darrius Rausch MD as PCP - PCP-[facility] Attributed-Roster     Review of Systems:     Review of Systems   HENT: Negative for congestion  Respiratory: Negative for chest tightness and shortness of breath  Cardiovascular: Negative for chest pain  Gastrointestinal: Negative for constipation  Genitourinary:        Urinary continence issues   Musculoskeletal: Positive for arthralgias, gait problem and joint swelling  Neurological: Positive for tremors  Negative for dizziness and headaches  Psychiatric/Behavioral: The patient is nervous/anxious           Problem List:     Patient Active Problem List   Diagnosis    Anxiety    Arthritis    Hyperlipidemia    Hyperthyroidism    Meningioma ([facility] )    Stage 3b chronic kidney disease ([facility] )      Past Medical and Surgical History:     Past Medical History:   Diagnosis Date    Anxiety     Arthritis     Hyperlipidemia     Hypertension     Hyperthyroidism      Past Surgical History:   Procedure Laterality Date    ABDOMINAL SURGERY      peritoneal     APPENDECTOMY      HYSTERECTOMY        Family History:     Family History   Problem Relation Age of Onset    Heart disease Father     Pulmonary embolism Father     Dementia Sister     Lung cancer Other     Diabetes Mother       Social History:     Social History     Socioeconomic History    Marital status: /Civil Union     Spouse name: None    Number of children: 2    Years of education: None    Highest education level: None   Occupational History    Occupation: retired   Tobacco Use    Smoking status: Former Smoker     Years: 15 00    Smokeless tobacco: Never Used   Substance and Sexual Activity    Alcohol use: No    Drug use: No    Sexual activity: Never   Other Topics Concern    None   Social History Narrative    Always uses seatbelt    Apartment lives in apt as part of daughters house    Pt has a living will    Power of  in existence    Oriental orthodox     Supportive and safe    Active advance directive    Advance directive information unavailable Social Determinants of Health     Financial Resource Strain: Low Risk     Difficulty of Paying Living Expenses: Not hard at all   Food Insecurity: Not on file   Transportation Needs: No Transportation Needs    Lack of Transportation (Medical): No    Lack of Transportation (Non-Medical): No   Physical Activity: Not on file   Stress: Not on file   Social Connections: Not on file   Intimate Partner Violence: Not on file   Housing Stability: Not on file      Medications and Allergies:     Current Outpatient Medications   Medication Sig Dispense Refill    aspirin 81 MG tablet Take 1 tablet by mouth daily      busPIRone (BUSPAR) 5 mg tablet Take 1 tablet (5 mg total) by mouth 2 (two) times a day 60 tablet 5    clonazePAM (KlonoPIN) 1 mg tablet 1/2 po tid prn anxiety 45 tablet 1    naloxone (NARCAN) 4 mg/0 1 mL nasal spray Administer 1 spray into a nostril if takes extra Tramadol  1 each 1    naproxen (NAPROSYN) 375 mg tablet Take 1 tablet (375 mg total) by mouth 2 (two) times a day with meals 60 tablet 2    traMADol (Ultram) 50 mg tablet Take 1 tablet (50 mg total) by mouth every 8 (eight) hours as needed for moderate pain 90 tablet 0     No current facility-administered medications for this visit  No Known Allergies   Immunizations:     Immunization History   Administered Date(s) Administered    Influenza Quadrivalent Preservative Free 3 years and older IM 11/24/2014    Influenza Split High Dose Preservative Free IM 11/12/2015    Influenza, seasonal, injectable 11/06/2013    Pneumococcal Polysaccharide PPV23 10/01/2002, 03/26/2014      Health Maintenance: There are no preventive care reminders to display for this patient  Topic Date Due    COVID-19 Vaccine (1) Never done    Pneumococcal Vaccine: 65+ Years (2 - PCV) 03/26/2015    Influenza Vaccine (1) 09/01/2022      Medicare Screening Tests and Risk Assessments:     Ruiz Peterson is here for her Subsequent Wellness visit       Health Risk Assessment:   Patient rates overall health as fair  Patient feels that their physical health rating is slightly worse  Patient is satisfied with their life  Eyesight was rated as same  Hearing was rated as same  Patient feels that their emotional and mental health rating is same  Patients states they are never, rarely angry  Patient states they are sometimes unusually tired/fatigued  Pain experienced in the last 7 days has been some  Patient's pain rating has been 8/10  Patient states that she has experienced no weight loss or gain in last 6 months  Depression Screening:   PHQ-2 Score: 1      Fall Risk Screening: In the past year, patient has experienced: history of falling in past year    Number of falls: 1  Injured during fall?: No    Feels unsteady when standing or walking?: Yes    Worried about falling?: Yes      Urinary Incontinence Screening:   Patient has leaked urine accidently in the last six months  Screening done, advised to do Kegel exercises    Home Safety:  Patient has trouble with stairs inside or outside of their home  Patient has working smoke alarms and has working carbon monoxide detector  Home safety hazards include: none  Nutrition:   Current diet is Regular  Medications:   Patient is not currently taking any over-the-counter supplements  Patient is able to manage medications  Activities of Daily Living (ADLs)/Instrumental Activities of Daily Living (IADLs):   Walk and transfer into and out of bed and chair?: Yes  Dress and groom yourself?: Yes    Bathe or shower yourself?: Yes    Feed yourself?  Yes  Do your laundry/housekeeping?: Yes  Manage your money, pay your bills and track your expenses?: No  Make your own meals?: No    Do your own shopping?: No    ADL comments: Son and daughter in law  Help with  Meals and do shopping    Previous Hospitalizations:   Any hospitalizations or ED visits within the last 12 months?: No      Advance Care Planning:     Durable POA for healthcare: Yes      Comments: Son Dawood Monterroso is POA-she lives with him and his wife Jean Lange    Cognitive Screening:   Provider or family/friend/caregiver concerned regarding cognition?: No    PREVENTIVE SCREENINGS      Cardiovascular Screening:    General: History Lipid Disorder and Screening Current      Diabetes Screening:     General: Screening Current      Colorectal Cancer Screening:     General: Screening Not Indicated      Breast Cancer Screening:     General: Screening Not Indicated      Cervical Cancer Screening:    General: Screening Not Indicated      Osteoporosis Screening:    General: Screening Not Indicated      Abdominal Aortic Aneurysm (AAA) Screening:        General: Screening Not Indicated      Lung Cancer Screening:     General: Screening Not Indicated      Hepatitis C Screening:    General: Screening Not Indicated    Screening, Brief Intervention, and Referral to Treatment (SBIRT)    Screening  Typical number of drinks in a day: 0  Typical number of drinks in a week: 0  Interpretation: Low risk drinking behavior  Single Item Drug Screening:  How often have you used an illegal drug (including marijuana) or a prescription medication for non-medical reasons in the past year? never    Single Item Drug Screen Score: 0  Interpretation: Negative screen for possible drug use disorder    Review of Current Opioid Use  Opioid Risk Tool (ORT) Score: 0  Opioid Risk Tool (ORT) Interpretation: Score 0-3: Low risk for opioid misuse    [place] or [place]  reviewed  No red flags were identified    Current treatment plan: cont Tramadol and add Naprosyn 375  Educational information on non-opioid treatment options provided    Other Counseling Topics:   Skin self-exam and calcium and vitamin D intake and regular weightbearing exercise       No exam data present     Physical Exam:     /64 (BP Location: Left arm, Patient Position: Sitting, Cuff Size: Standard)   Pulse 64   Breastfeeding No Comment: postmenopausal    Physical Exam  Vitals reviewed  Constitutional:       Appearance: She is normal weight  Comments: Frail appearing , no acute distress   Cardiovascular:      Rate and Rhythm: Normal rate and regular rhythm  Pulses: Normal pulses  Heart sounds: Normal heart sounds  Pulmonary:      Effort: Pulmonary effort is normal       Breath sounds: Normal breath sounds  Musculoskeletal:         General: Tenderness and deformity present  Right lower leg: No edema  Left lower leg: No edema  Comments: Hands  Swollen ,diffuse  Tenderness hands and  Low back,knees   Lymphadenopathy:      Cervical: No cervical adenopathy  Neurological:      Mental Status: She is alert  Gait: Gait abnormal           Paris Escalona MD  Falls Plan of Care: Balance, strength, and gait training instructions were provided

## 2022-09-23 NOTE — ASSESSMENT & PLAN NOTE
Lab Results   Component Value Date    EGFR 42 03/21/2022    CREATININE 1 16 03/21/2022    CREATININE 1 12 11/25/2015    CREATININE 0 99 10/21/2015   recheck kidney function after starting Naprosyn

## 2022-09-23 NOTE — PATIENT INSTRUCTIONS
Add Naprosyn, await  lab,same dose Tramadol  Urinary Incontinence   AMBULATORY CARE:   Urinary incontinence (UI)  is when you lose control of your bladder  UI develops because your bladder cannot store or empty urine properly  The 3 most common types of UI are stress incontinence, urge incontinence, or both  Common symptoms include the following: You feel like your bladder does not empty completely when you urinate  You urinate often and need to urinate immediately  You leak urine when you sleep, or you wake up with the urge to urinate  You leak urine when you cough, sneeze, exercise, or laugh  Call your doctor if:   You have severe pain  You are confused or cannot think clearly  You have a fever  You see blood in your urine  You have pain when you urinate  You have new or worse pain, even after treatment  Your mouth feels dry or you have vision changes  Your urine is cloudy or smells bad  You have questions or concerns about your condition or care  Medicines:   Medicines  may be given to help strengthen your bladder control  Take your medicine as directed  Contact your healthcare provider if you think your medicine is not helping or if you have side effects  Tell him or her if you are allergic to any medicine  Keep a list of the medicines, vitamins, and herbs you take  Include the amounts, and when and why you take them  Bring the list or the pill bottles to follow-up visits  Carry your medicine list with you in case of an emergency  Do pelvic muscle exercises often:  Your pelvic muscles help you stop urinating  Squeeze these muscles tight for 5 seconds, then relax for 5 seconds  Gradually work up to squeezing for 10 seconds  Do 3 sets of 15 repetitions a day, or as directed  This will help strengthen your pelvic muscles and improve bladder control  Train your bladder:  Go to the bathroom at set times, such as every 2 hours, even if you do not feel the urge to go  You can also try to hold your urine when you feel the urge to go  For example, hold your urine for 5 minutes when you feel the urge to go  As that becomes easier, hold your urine for 10 minutes  Self-care:   Keep a UI record  Write down how often you leak urine and how much you leak  Make a note of what you were doing when you leaked urine  Drink liquids as directed  Ask your healthcare provider how much liquid to drink each day and which liquids are best for you  You may need to limit the amount of liquid you drink to help control your urine leakage  Do not drink any liquid right before you go to bed  Limit or do not have drinks that contain caffeine or alcohol  Prevent constipation  Eat a variety of high-fiber foods  Good examples are high-fiber cereals, beans, vegetables, and whole-grain breads  Prune juice may help make your bowel movement softer  Walking is the best way to trigger your intestines to have a bowel movement  Exercise regularly and maintain a healthy weight  Ask your healthcare provider how much you should weigh and about the best exercise plan for you  Weight loss and exercise will decrease pressure on your bladder and help you control your leakage  Ask him or her to help you create a weight loss plan if you are overweight  Use a catheter as directed  to help empty your bladder  A catheter is a tiny, plastic tube that is put into your bladder to drain your urine  Your healthcare provider may tell you to use a catheter to prevent your bladder from getting too full and leaking urine  Go to behavior therapy as directed  Behavior therapy may be used to help you learn to control your urge to urinate  Follow up with your doctor as directed:  Write down your questions so you remember to ask them during your visits  © Copyright [employer] 2022 Information is for End User's use only and may not be sold, redistributed or otherwise used for commercial purposes   All illustrations and images included in CareNotes® are the copyrighted property of [employer]  or [employer]   The above information is an  only  It is not intended as medical advice for individual conditions or treatments  Talk to your doctor, nurse or pharmacist before following any medical regimen to see if it is safe and effective for you  Kegel Exercises for Women   AMBULATORY CARE:   Kegel exercises  help strengthen your pelvic muscles  Pelvic muscles hold your pelvic organs, such as your bladder and uterus, in place  Kegel exercises help prevent or control problems with urine incontinence (leakage)  Incontinence may be caused by pregnancy, childbirth, or menopause  Contact your healthcare provider if:   You cannot feel your muscles tighten or relax  You continue to leak urine  You have questions or concerns about your condition or care  Use the correct muscles:  Pelvic muscles are the muscles you use to control urine flow  To target these muscles, stop and start the flow of urine several times  This will help you become familiar with how it feels to tighten and relax these muscles  How to do Kegel exercises:   Empty your bladder  You may lie down, stand up, or sit down to do these exercises  When you first try to do these exercises, it may be easier if you lie down  Tighten or squeeze your pelvic muscles slowly  It may feel like you are trying to hold back urine or gas  Hold this position for 3 seconds  Relax for 3 seconds  Repeat this cycle 10 times  Do 10 sets of Kegel exercises, at least 3 times a day  Do not hold your breath when you do Kegel exercises  Keep your stomach, back, and leg muscles relaxed  As your muscles get stronger, you will be able to hold the squeeze longer  Your healthcare provider may ask that you increase your pelvic muscle squeeze to 10 seconds  After you squeeze for 10 seconds, relax for 10 seconds      What else you should know:   Once you know how to do Kegel exercises, use different positions  You can do these exercises while you lie on the floor, sit at your desk or watch TV, and while you stand  You may notice improved bladder control within about 6 weeks  Tighten your pelvic muscles before you sneeze, cough, or lift to prevent urine leakage  Follow up with your doctor as directed:  Write down your questions so you remember to ask them during your visits  © Copyright [employer] 2022 Information is for End User's use only and may not be sold, redistributed or otherwise used for commercial purposes  All illustrations and images included in CareNotes® are the copyrighted property of [employer] D A M [employer] Inc  or [employer]   The above information is an  only  It is not intended as medical advice for individual conditions or treatments  Talk to your doctor, nurse or pharmacist before following any medical regimen to see if it is safe and effective for you  Fall Prevention   AMBULATORY CARE:   Fall prevention  includes ways to make your home and other areas safer  It also includes ways you can move more carefully to prevent a fall  Health conditions that cause changes in your blood pressure, vision, or muscle strength and coordination may increase your risk for falls  Medicines may also increase your risk for falls if they make you dizzy, weak, or sleepy  Call 911 or have someone else call if:   You have fallen and are unconscious  You have fallen and cannot move part of your body  Contact your healthcare provider if:   You have fallen and have pain or a headache  You have questions or concerns about your condition or care  Fall prevention tips:   Stand or sit up slowly  This may help you keep your balance and prevent falls  Use assistive devices as directed  Your healthcare provider may suggest that you use a cane or walker to help you keep your balance   You may need to have grab bars put in your bathroom near the toilet or in the shower  Wear shoes that fit well and have soles that   Wear shoes both inside and outside  Use slippers with good   Do not wear shoes with high heels  Wear a personal alarm  This is a device that allows you to call 911 if you fall and need help  Ask your healthcare provider for more information  Stay active  Exercise can help strengthen your muscles and improve your balance  Your healthcare provider may recommend water aerobics or walking  He or she may also recommend physical therapy to improve your coordination  Never start an exercise program without talking to your healthcare provider first          Manage your medical conditions  Keep all appointments with your healthcare providers  Visit your eye doctor as directed  Home safety tips: Add items to prevent falls in the bathroom  Put nonslip strips on your bath or shower floor to prevent you from slipping  Use a bath mat if you do not have carpet in the bathroom  This will prevent you from falling when you step out of the bath or shower  Use a shower seat so you do not need to stand while you shower  Sit on the toilet or a chair in your bathroom to dry yourself and put on clothing  This will prevent you from losing your balance from drying or dressing yourself while you are standing  Keep paths clear  Remove books, shoes, and other objects from walkways and stairs  Place cords for telephones and lamps out of the way so that you do not need to walk over them  Tape them down if you cannot move them  Remove small rugs  If you cannot remove a rug, secure it with double-sided tape  This will prevent you from tripping  Install bright lights in your home  Use night lights to help light paths to the bathroom or kitchen  Always turn on the light before you start walking  Keep items you use often on shelves within reach  Do not use a step stool to help you reach an item      Paint or place reflective tape on the edges of your stairs  This will help you see the stairs better  Follow up with your doctor as directed:  Write down your questions so you remember to ask them during your visits  © Copyright [employer] 2022 Information is for End User's use only and may not be sold, redistributed or otherwise used for commercial purposes  All illustrations and images included in CareNotes® are the copyrighted property of A D A M , Inc  or Diamante Millard  The above information is an  only  It is not intended as medical advice for individual conditions or treatments  Talk to your doctor, nurse or pharmacist before following any medical regimen to see if it is safe and effective for you

## 2022-10-05 ENCOUNTER — TELEPHONE (OUTPATIENT)
Dept: FAMILY MEDICINE CLINIC | Facility: CLINIC | Age: 87
End: 2022-10-05

## 2022-10-05 DIAGNOSIS — F41.9 ANXIETY: ICD-10-CM

## 2022-10-05 DIAGNOSIS — M19.90 ARTHRITIS: Primary | ICD-10-CM

## 2022-10-05 RX ORDER — CLONAZEPAM 1 MG/1
TABLET ORAL
Qty: 45 TABLET | Refills: 1 | Status: SHIPPED | OUTPATIENT
Start: 2022-10-05

## 2022-10-05 RX ORDER — MELOXICAM 7.5 MG/1
7.5 TABLET ORAL DAILY
Qty: 30 TABLET | Refills: 1 | Status: SHIPPED | OUTPATIENT
Start: 2022-10-05

## 2022-10-05 NOTE — TELEPHONE ENCOUNTER
Patient agreeable to trying Meloxicam and asked it to be sent to [facility] in [address] [place] [address]

## 2022-10-05 NOTE — TELEPHONE ENCOUNTER
Pt is calling regarding her Naproxen  She states that it makes her have difficulty keeping her balance, which makes walking difficult  She was taking it with meals as directed  She stopped taking it on Sunday  What does Dr Jose Antonio Mandujano recommend?

## 2022-10-17 DIAGNOSIS — M19.90 ARTHRITIS: ICD-10-CM

## 2022-10-17 RX ORDER — TRAMADOL HYDROCHLORIDE 50 MG/1
50 TABLET ORAL EVERY 8 HOURS PRN
Qty: 90 TABLET | Refills: 0 | Status: SHIPPED | OUTPATIENT
Start: 2022-10-17 | End: 2022-10-19 | Stop reason: SDUPTHER

## 2022-10-17 NOTE — TELEPHONE ENCOUNTER
Requested Prescriptions     Pending Prescriptions Disp Refills   • traMADol (Ultram) 50 mg tablet 90 tablet 0     Sig: Take 1 tablet (50 mg total) by mouth every 8 (eight) hours as needed for moderate pain     LOV 9/22/22, F/U non scheduled, labs active

## 2022-10-19 ENCOUNTER — TELEPHONE (OUTPATIENT)
Dept: FAMILY MEDICINE CLINIC | Facility: CLINIC | Age: 87
End: 2022-10-19

## 2022-10-19 DIAGNOSIS — M19.90 ARTHRITIS: ICD-10-CM

## 2022-10-19 RX ORDER — TRAMADOL HYDROCHLORIDE 50 MG/1
50 TABLET ORAL EVERY 8 HOURS PRN
Qty: 90 TABLET | Refills: 0 | Status: SHIPPED | OUTPATIENT
Start: 2022-10-19

## 2022-10-19 NOTE — TELEPHONE ENCOUNTER
The pharmacy is calling regarding patient's Tramadol prescription  They received a prescription yesterday that was post-dated for 10/22/22  However they received another script today  They want to know which script they should be using?

## 2022-10-19 NOTE — TELEPHONE ENCOUNTER
MF, Pt called requesting a refill for her Tramadol ,But as per pharmacy this Rx has a date to be filled of 10/22/2022 but pt states that she only has enough for today, Can we refill sooner?

## 2022-10-20 NOTE — TELEPHONE ENCOUNTER
I spoke to the Pharmacist at [facility]  and she is aware Triny Yovany DANGELO is aware of the early fill and she approves it to be filled early

## 2022-10-24 DIAGNOSIS — F41.9 ANXIETY: ICD-10-CM

## 2022-10-24 RX ORDER — BUSPIRONE HYDROCHLORIDE 5 MG/1
5 TABLET ORAL 2 TIMES DAILY
Qty: 60 TABLET | Refills: 5 | Status: SHIPPED | OUTPATIENT
Start: 2022-10-24

## 2022-10-24 NOTE — TELEPHONE ENCOUNTER
Requested Prescriptions     Pending Prescriptions Disp Refills   • busPIRone (BUSPAR) 5 mg tablet 60 tablet 5     Sig: Take 1 tablet (5 mg total) by mouth 2 (two) times a day     LOV 9/22/22, F/U non scheduled, Labs active

## 2022-11-17 DIAGNOSIS — M19.90 ARTHRITIS: ICD-10-CM

## 2022-11-17 RX ORDER — TRAMADOL HYDROCHLORIDE 50 MG/1
50 TABLET ORAL EVERY 8 HOURS PRN
Qty: 90 TABLET | Refills: 0 | Status: SHIPPED | OUTPATIENT
Start: 2022-11-17

## 2022-11-22 DIAGNOSIS — F41.9 ANXIETY: ICD-10-CM

## 2022-11-22 RX ORDER — BUSPIRONE HYDROCHLORIDE 5 MG/1
5 TABLET ORAL 2 TIMES DAILY
Qty: 60 TABLET | Refills: 5 | Status: SHIPPED | OUTPATIENT
Start: 2022-11-22

## 2022-11-30 DIAGNOSIS — M19.90 ARTHRITIS: ICD-10-CM

## 2022-11-30 RX ORDER — MELOXICAM 7.5 MG/1
7.5 TABLET ORAL DAILY
Qty: 30 TABLET | Refills: 3 | Status: SHIPPED | OUTPATIENT
Start: 2022-11-30

## 2022-12-05 ENCOUNTER — TELEPHONE (OUTPATIENT)
Dept: FAMILY MEDICINE CLINIC | Facility: CLINIC | Age: 87
End: 2022-12-05

## 2022-12-05 DIAGNOSIS — J06.9 UPPER RESPIRATORY TRACT INFECTION, UNSPECIFIED TYPE: Primary | ICD-10-CM

## 2022-12-05 DIAGNOSIS — F41.9 ANXIETY: ICD-10-CM

## 2022-12-05 RX ORDER — AZITHROMYCIN 250 MG/1
TABLET, FILM COATED ORAL
Qty: 6 TABLET | Refills: 0 | Status: SHIPPED | OUTPATIENT
Start: 2022-12-05 | End: 2022-12-09

## 2022-12-05 RX ORDER — CLONAZEPAM 1 MG/1
TABLET ORAL
Qty: 45 TABLET | Refills: 1 | Status: SHIPPED | OUTPATIENT
Start: 2022-12-05

## 2022-12-05 NOTE — TELEPHONE ENCOUNTER
Pt son called in requesting a call from Dr Rosita Falcon because pr is coming down with a cold, I offered an appt with Dr Rosita Falcon at 12:20 pm which was available but he refused and just requested a call from the  [phone] [facility]

## 2022-12-06 NOTE — TELEPHONE ENCOUNTER
[facility] pharmacist called  They need verification for Clonazepam since there is a potential overdose with Tramadol    Call Giselle back at [phone]

## 2022-12-07 ENCOUNTER — TELEPHONE (OUTPATIENT)
Dept: FAMILY MEDICINE CLINIC | Facility: CLINIC | Age: 87
End: 2022-12-07

## 2022-12-07 NOTE — TELEPHONE ENCOUNTER
Patient son called in and stated that dr Jordana Darden perscribed the patient an antibiotic azithromycin 250 mg, which has been causing the patient diarrhea, son stated that she has taken it twice and each time she has had the diarrhea  Son is not sure what to do next  Please advise   Thank you

## 2022-12-23 ENCOUNTER — APPOINTMENT (EMERGENCY)
Dept: CT IMAGING | Facility: HOSPITAL | Age: 87
End: 2022-12-23

## 2022-12-23 ENCOUNTER — APPOINTMENT (EMERGENCY)
Dept: RADIOLOGY | Facility: HOSPITAL | Age: 87
End: 2022-12-23

## 2022-12-23 ENCOUNTER — HOSPITAL ENCOUNTER (INPATIENT)
Facility: HOSPITAL | Age: 87
LOS: 6 days | Discharge: NURSING, REHAB OR LONG-TERM CARE | End: 2022-12-29
Attending: EMERGENCY MEDICINE | Admitting: INTERNAL MEDICINE

## 2022-12-23 DIAGNOSIS — I16.0 HYPERTENSIVE URGENCY: ICD-10-CM

## 2022-12-23 DIAGNOSIS — G93.40 ACUTE ENCEPHALOPATHY: Primary | ICD-10-CM

## 2022-12-23 DIAGNOSIS — U07.1 COVID-19: ICD-10-CM

## 2022-12-23 DIAGNOSIS — R33.9 URINARY RETENTION: ICD-10-CM

## 2022-12-23 PROBLEM — N18.32 STAGE 3B CHRONIC KIDNEY DISEASE (HCC): Chronic | Status: ACTIVE | Noted: 2022-09-22

## 2022-12-23 LAB
ALBUMIN SERPL BCP-MCNC: 4.5 G/DL (ref 3.5–5)
ALP SERPL-CCNC: 62 U/L (ref 34–104)
ALT SERPL W P-5'-P-CCNC: 17 U/L (ref 7–52)
AMMONIA PLAS-SCNC: 30 UMOL/L (ref 18–72)
AMORPH URATE CRY URNS QL MICRO: ABNORMAL /HPF
ANION GAP SERPL CALCULATED.3IONS-SCNC: 12 MMOL/L (ref 4–13)
AST SERPL W P-5'-P-CCNC: 28 U/L (ref 13–39)
BACTERIA UR QL AUTO: ABNORMAL /HPF
BASOPHILS # BLD AUTO: 0.05 THOUSANDS/ÂΜL (ref 0–0.1)
BASOPHILS NFR BLD AUTO: 0 % (ref 0–1)
BILIRUB SERPL-MCNC: 1 MG/DL (ref 0.2–1)
BILIRUB UR QL STRIP: NEGATIVE
BUN SERPL-MCNC: 15 MG/DL (ref 5–25)
CALCIUM SERPL-MCNC: 10 MG/DL (ref 8.4–10.2)
CHLORIDE SERPL-SCNC: 102 MMOL/L (ref 96–108)
CLARITY UR: CLEAR
CO2 SERPL-SCNC: 26 MMOL/L (ref 21–32)
COLOR UR: ABNORMAL
CREAT SERPL-MCNC: 0.87 MG/DL (ref 0.6–1.3)
CRP SERPL QL: 21.5 MG/L
D DIMER PPP FEU-MCNC: 1.03 UG/ML FEU
EOSINOPHIL # BLD AUTO: 0.03 THOUSAND/ÂΜL (ref 0–0.61)
EOSINOPHIL NFR BLD AUTO: 0 % (ref 0–6)
ERYTHROCYTE [DISTWIDTH] IN BLOOD BY AUTOMATED COUNT: 12.9 % (ref 11.6–15.1)
FLUAV RNA RESP QL NAA+PROBE: NEGATIVE
FLUBV RNA RESP QL NAA+PROBE: NEGATIVE
GAS + CO PNL BLDA: 2.5 % (ref 0–1.5)
GFR SERPL CREATININE-BSD FRML MDRD: 59 ML/MIN/1.73SQ M
GLUCOSE SERPL-MCNC: 112 MG/DL (ref 65–140)
GLUCOSE UR STRIP-MCNC: NEGATIVE MG/DL
HCT VFR BLD AUTO: 39.6 % (ref 34.8–46.1)
HGB BLD-MCNC: 13 G/DL (ref 11.5–15.4)
HGB UR QL STRIP.AUTO: ABNORMAL
IMM GRANULOCYTES # BLD AUTO: 0.06 THOUSAND/UL (ref 0–0.2)
IMM GRANULOCYTES NFR BLD AUTO: 0 % (ref 0–2)
KETONES UR STRIP-MCNC: ABNORMAL MG/DL
LEUKOCYTE ESTERASE UR QL STRIP: NEGATIVE
LYMPHOCYTES # BLD AUTO: 1.05 THOUSANDS/ÂΜL (ref 0.6–4.47)
LYMPHOCYTES NFR BLD AUTO: 7 % (ref 14–44)
MCH RBC QN AUTO: 31.4 PG (ref 26.8–34.3)
MCHC RBC AUTO-ENTMCNC: 32.8 G/DL (ref 31.4–37.4)
MCV RBC AUTO: 96 FL (ref 82–98)
MONOCYTES # BLD AUTO: 0.99 THOUSAND/ÂΜL (ref 0.17–1.22)
MONOCYTES NFR BLD AUTO: 7 % (ref 4–12)
MUCOUS THREADS UR QL AUTO: ABNORMAL
NEUTROPHILS # BLD AUTO: 13.11 THOUSANDS/ÂΜL (ref 1.85–7.62)
NEUTS SEG NFR BLD AUTO: 86 % (ref 43–75)
NITRITE UR QL STRIP: NEGATIVE
NON-SQ EPI CELLS URNS QL MICRO: ABNORMAL /HPF
NRBC BLD AUTO-RTO: 0 /100 WBCS
PH UR STRIP.AUTO: 6 [PH]
PLATELET # BLD AUTO: 223 THOUSANDS/UL (ref 149–390)
PMV BLD AUTO: 11 FL (ref 8.9–12.7)
POTASSIUM SERPL-SCNC: 3.6 MMOL/L (ref 3.5–5.3)
PROCALCITONIN SERPL-MCNC: 0.05 NG/ML
PROT SERPL-MCNC: 7.7 G/DL (ref 6.4–8.4)
PROT UR STRIP-MCNC: ABNORMAL MG/DL
RBC # BLD AUTO: 4.14 MILLION/UL (ref 3.81–5.12)
RBC #/AREA URNS AUTO: ABNORMAL /HPF
RSV RNA RESP QL NAA+PROBE: NEGATIVE
SARS-COV-2 RNA RESP QL NAA+PROBE: POSITIVE
SODIUM SERPL-SCNC: 140 MMOL/L (ref 135–147)
SP GR UR STRIP.AUTO: 1.02
T4 FREE SERPL-MCNC: 1.35 NG/DL (ref 0.76–1.46)
TSH SERPL DL<=0.05 MIU/L-ACNC: 0.45 UIU/ML (ref 0.45–4.5)
UROBILINOGEN UR QL STRIP.AUTO: 0.2 E.U./DL
WBC # BLD AUTO: 15.29 THOUSAND/UL (ref 4.31–10.16)
WBC #/AREA URNS AUTO: ABNORMAL /HPF

## 2022-12-23 RX ORDER — ASPIRIN 81 MG/1
81 TABLET ORAL DAILY
Status: DISCONTINUED | OUTPATIENT
Start: 2022-12-24 | End: 2022-12-29 | Stop reason: HOSPADM

## 2022-12-23 RX ORDER — DEXTROSE AND SODIUM CHLORIDE 5; .45 G/100ML; G/100ML
75 INJECTION, SOLUTION INTRAVENOUS CONTINUOUS
Status: DISCONTINUED | OUTPATIENT
Start: 2022-12-23 | End: 2022-12-24

## 2022-12-23 RX ORDER — BUSPIRONE HYDROCHLORIDE 5 MG/1
5 TABLET ORAL 2 TIMES DAILY
Status: DISCONTINUED | OUTPATIENT
Start: 2022-12-23 | End: 2022-12-29 | Stop reason: HOSPADM

## 2022-12-23 RX ORDER — CEFTRIAXONE 1 G/50ML
1000 INJECTION, SOLUTION INTRAVENOUS EVERY 24 HOURS
Status: DISCONTINUED | OUTPATIENT
Start: 2022-12-23 | End: 2022-12-27

## 2022-12-23 RX ORDER — ENOXAPARIN SODIUM 100 MG/ML
30 INJECTION SUBCUTANEOUS EVERY 12 HOURS SCHEDULED
Status: DISCONTINUED | OUTPATIENT
Start: 2022-12-23 | End: 2022-12-28

## 2022-12-23 RX ORDER — ACETAMINOPHEN 325 MG/1
650 TABLET ORAL EVERY 6 HOURS PRN
Status: DISCONTINUED | OUTPATIENT
Start: 2022-12-23 | End: 2022-12-29 | Stop reason: HOSPADM

## 2022-12-23 RX ORDER — ENOXAPARIN SODIUM 100 MG/ML
30 INJECTION SUBCUTANEOUS EVERY 12 HOURS SCHEDULED
Status: DISCONTINUED | OUTPATIENT
Start: 2022-12-23 | End: 2022-12-23

## 2022-12-23 RX ORDER — HYDRALAZINE HYDROCHLORIDE 20 MG/ML
5 INJECTION INTRAMUSCULAR; INTRAVENOUS EVERY 6 HOURS PRN
Status: DISCONTINUED | OUTPATIENT
Start: 2022-12-23 | End: 2022-12-29 | Stop reason: HOSPADM

## 2022-12-23 RX ORDER — CLONAZEPAM 0.5 MG/1
0.25 TABLET ORAL 2 TIMES DAILY PRN
Status: DISCONTINUED | OUTPATIENT
Start: 2022-12-23 | End: 2022-12-29 | Stop reason: HOSPADM

## 2022-12-23 RX ORDER — AMLODIPINE BESYLATE 5 MG/1
5 TABLET ORAL DAILY
Status: DISCONTINUED | OUTPATIENT
Start: 2022-12-24 | End: 2022-12-29 | Stop reason: HOSPADM

## 2022-12-23 RX ADMIN — DEXTROSE AND SODIUM CHLORIDE 75 ML/HR: 5; .45 INJECTION, SOLUTION INTRAVENOUS at 20:48

## 2022-12-23 RX ADMIN — CEFTRIAXONE 1000 MG: 1 INJECTION, SOLUTION INTRAVENOUS at 20:40

## 2022-12-23 RX ADMIN — BUSPIRONE HYDROCHLORIDE 5 MG: 5 TABLET ORAL at 20:42

## 2022-12-23 RX ADMIN — ENOXAPARIN SODIUM 30 MG: 100 INJECTION SUBCUTANEOUS at 20:47

## 2022-12-23 RX ADMIN — HYDRALAZINE HYDROCHLORIDE 5 MG: 20 INJECTION, SOLUTION INTRAMUSCULAR; INTRAVENOUS at 22:10

## 2022-12-24 PROBLEM — R29.6 FREQUENT FALLS: Status: ACTIVE | Noted: 2022-12-24

## 2022-12-24 LAB
ANION GAP SERPL CALCULATED.3IONS-SCNC: 10 MMOL/L (ref 4–13)
ATRIAL RATE: 77 BPM
BUN SERPL-MCNC: 15 MG/DL (ref 5–25)
CALCIUM SERPL-MCNC: 9 MG/DL (ref 8.4–10.2)
CHLORIDE SERPL-SCNC: 103 MMOL/L (ref 96–108)
CO2 SERPL-SCNC: 24 MMOL/L (ref 21–32)
CREAT SERPL-MCNC: 0.78 MG/DL (ref 0.6–1.3)
ERYTHROCYTE [DISTWIDTH] IN BLOOD BY AUTOMATED COUNT: 13.2 % (ref 11.6–15.1)
GFR SERPL CREATININE-BSD FRML MDRD: 67 ML/MIN/1.73SQ M
GLUCOSE SERPL-MCNC: 100 MG/DL (ref 65–140)
GLUCOSE SERPL-MCNC: 117 MG/DL (ref 65–140)
HCT VFR BLD AUTO: 33.4 % (ref 34.8–46.1)
HGB BLD-MCNC: 11.1 G/DL (ref 11.5–15.4)
MAGNESIUM SERPL-MCNC: 1.9 MG/DL (ref 1.9–2.7)
MCH RBC QN AUTO: 31.4 PG (ref 26.8–34.3)
MCHC RBC AUTO-ENTMCNC: 33.2 G/DL (ref 31.4–37.4)
MCV RBC AUTO: 95 FL (ref 82–98)
P AXIS: 65 DEGREES
PLATELET # BLD AUTO: 195 THOUSANDS/UL (ref 149–390)
PMV BLD AUTO: 10.6 FL (ref 8.9–12.7)
POTASSIUM SERPL-SCNC: 3.1 MMOL/L (ref 3.5–5.3)
PR INTERVAL: 130 MS
PROCALCITONIN SERPL-MCNC: 0.06 NG/ML
QRS AXIS: 39 DEGREES
QRSD INTERVAL: 126 MS
QT INTERVAL: 432 MS
QTC INTERVAL: 488 MS
RBC # BLD AUTO: 3.53 MILLION/UL (ref 3.81–5.12)
SODIUM SERPL-SCNC: 137 MMOL/L (ref 135–147)
T WAVE AXIS: 10 DEGREES
VENTRICULAR RATE: 77 BPM
WBC # BLD AUTO: 8.02 THOUSAND/UL (ref 4.31–10.16)

## 2022-12-24 RX ORDER — POTASSIUM CHLORIDE 20 MEQ/1
40 TABLET, EXTENDED RELEASE ORAL ONCE
Status: COMPLETED | OUTPATIENT
Start: 2022-12-24 | End: 2022-12-24

## 2022-12-24 RX ADMIN — BUSPIRONE HYDROCHLORIDE 5 MG: 5 TABLET ORAL at 21:36

## 2022-12-24 RX ADMIN — ENOXAPARIN SODIUM 30 MG: 100 INJECTION SUBCUTANEOUS at 20:23

## 2022-12-24 RX ADMIN — AMLODIPINE BESYLATE 5 MG: 5 TABLET ORAL at 11:25

## 2022-12-24 RX ADMIN — POTASSIUM CHLORIDE 40 MEQ: 1500 TABLET, EXTENDED RELEASE ORAL at 11:25

## 2022-12-24 RX ADMIN — CEFTRIAXONE 1000 MG: 1 INJECTION, SOLUTION INTRAVENOUS at 20:23

## 2022-12-24 RX ADMIN — ENOXAPARIN SODIUM 30 MG: 100 INJECTION SUBCUTANEOUS at 11:26

## 2022-12-24 RX ADMIN — BUSPIRONE HYDROCHLORIDE 5 MG: 5 TABLET ORAL at 11:25

## 2022-12-24 RX ADMIN — ASPIRIN 81 MG: 81 TABLET, COATED ORAL at 11:25

## 2022-12-24 NOTE — PROGRESS NOTES
Jaysonpa U  66   Progress Note - [facility] 7/30/1933, 80 y o  female MRN: [id number]  Unit/Bed#: ICU 10-01 Encounter: [id number]  Primary Care Provider: Gonsalo Kate MD   Date and time admitted to hospital: 12/23/2022  3:35 PM    * Acute encephalopathy  Assessment & Plan  · CT head with finding of meningioma which has been seen on previous imaging  · Possibly secondary to medications versus acute viral illness  · We will continue supportive care  · Home Klonopin dose reduced  · Nonfocal neuro exam at this time    COVID-19  Assessment & Plan  · Son is also COVID positive and admitted to the hospital  · COVID + 12/23/2022  · CXR (12/23): No acute cardiopulmonary disease  · As she is not requiring supplemental oxygen, will not begin Remdesivir or Decadron  · Further supportive care    Hypertensive urgency  Assessment & Plan  · BP significantly elevated in the ER with SBP greater than 200  · Pressure has gradually improved  · Continue Norvasc 5 mg daily  · Monitor BP trends while admitted and adjust medications as indicated    Stage 3b chronic kidney disease [facility])  Assessment & Plan  Lab Results   Component Value Date    EGFR 67 12/24/2022    EGFR 59 12/23/2022    EGFR 42 03/21/2022    CREATININE 0 78 12/24/2022    CREATININE 0 87 12/23/2022    CREATININE 1 16 03/21/2022   · Creatinine at baseline  · Will continue routine lab monitoring    [facility])  Assessment & Plan  · This is a previously known finding  · CT imaging results appreciated    Hyperthyroidism  Assessment & Plan  · TSH 0 448, T4 free within normal limits    Anxiety  Assessment & Plan  · Continue home Buspar and Klonopin (at reduced dose)      VTE Prophylaxis:  Enoxaparin (Lovenox)    Patient Centered Rounds: I have performed bedside rounds with nursing staff today      Discussions with Specialists or Other Care Team Provider: yes  Education and Discussions with Family / Patient: Spoke with patient's daughter-in-law at bedside regarding plan of care    Current Length of Stay: 1 day(s)    Current Patient Status: Inpatient   Certification Statement: The patient will continue to require additional inpatient hospital stay due to COVID infection, encephalopathy    Discharge Plan: Pending hospital course    Code Status: Level 3 - DNAR and DNI    Subjective:   No overnight events noted mental status appears to be slightly improved today  Patient sitting up eating lunch in bed    Objective:     Vitals:   Temp (24hrs), Av 8 °F (36 6 °C), Min:97 5 °F (36 4 °C), Max:98 1 °F (36 7 °C)    Temp:  [97 5 °F (36 4 °C)-98 1 °F (36 7 °C)] 98 1 °F (36 7 °C)  HR:  [72-87] 78  Resp:  [15-20] 18  BP: (111-215)/(55-91) 143/68  SpO2:  [93 %-97 %] 95 %  Body mass index is 21 33 kg/m²  Input and Output Summary (last 24 hours): Intake/Output Summary (Last 24 hours) at 2022 1252  Last data filed at 2022 2200  Gross per 24 hour   Intake 90 ml   Output 300 ml   Net -210 ml       Physical Exam:   Physical Exam  Constitutional:       General: She is not in acute distress  Comments: Frail elderly female   HENT:      Head: Normocephalic and atraumatic  Nose: Nose normal       Mouth/Throat:      Mouth: Mucous membranes are moist    Eyes:      Extraocular Movements: Extraocular movements intact  Conjunctiva/sclera: Conjunctivae normal    Cardiovascular:      Rate and Rhythm: Normal rate and regular rhythm  Pulmonary:      Effort: Pulmonary effort is normal  No respiratory distress  Abdominal:      Palpations: Abdomen is soft  Tenderness: There is no abdominal tenderness  Musculoskeletal:         General: Normal range of motion  Cervical back: Normal range of motion  Comments: Generalized weakness   Skin:     General: Skin is warm and dry  Neurological:      General: No focal deficit present  Mental Status: She is alert  Mental status is at baseline  Cranial Nerves: No cranial nerve deficit  Psychiatric:         Mood and Affect: Mood normal          Behavior: Behavior normal          Additional Data:     Labs:    Results from last 7 days   Lab Units 12/24/22  0509 12/23/22  1553   WBC Thousand/uL 8 02 15 29*   HEMOGLOBIN g/dL 11 1* 13 0   HEMATOCRIT % 33 4* 39 6   PLATELETS Thousands/uL 195 223   NEUTROS PCT %  --  86*   LYMPHS PCT %  --  7*   MONOS PCT %  --  7   EOS PCT %  --  0     Results from last 7 days   Lab Units 12/24/22  0509 12/23/22  1553   SODIUM mmol/L 137 140   POTASSIUM mmol/L 3 1* 3 6   CHLORIDE mmol/L 103 102   CO2 mmol/L 24 26   BUN mg/dL 15 15   CREATININE mg/dL 0 78 0 87   CALCIUM mg/dL 9 0 10 0   ALK PHOS U/L  --  62   ALT U/L  --  17   AST U/L  --  28         Results from last 7 days   Lab Units 12/24/22  1057   POC GLUCOSE mg/dl 100           * I Have Reviewed All Lab Data Listed Above  * Additional Pertinent Lab Tests Reviewed: [facility] 66 Admission  Reviewed    Imaging:  Imaging Reports Reviewed Today Include: No new imaging    Recent Cultures (last 7 days):           Last 24 Hours Medication List:   Current Facility-Administered Medications   Medication Dose Route Frequency Provider Last Rate   • acetaminophen  650 mg Oral Q6H PRN [facility], DO     • amLODIPine  5 mg Oral Daily [facility], DO     • aspirin  81 mg Oral Daily [facility], DO     • busPIRone  5 mg Oral BID [facility], DO     • cefTRIAXone  1,000 mg Intravenous Q24H Cherrie Recinos PA-C 1,000 mg (12/23/22 2040)   • clonazePAM  0 25 mg Oral BID PRN [facility], DO     • enoxaparin  30 mg Subcutaneous Q12H [address] Katrin Cooney PA-C     • hydrALAZINE  5 mg Intravenous Q6H PRN [facility], DO          Today, Patient Was Seen By: Jesse Chan MD    ** Please Note: Dictation voice to text software may have been used in the creation of this document   **

## 2022-12-24 NOTE — ASSESSMENT & PLAN NOTE
· CT head with finding of meningioma which has been seen on previous imaging  · Possibly secondary to medications versus acute viral illness  · We will continue supportive care  · Home Klonopin dose reduced  · Nonfocal neuro exam at this time

## 2022-12-24 NOTE — ASSESSMENT & PLAN NOTE
Lab Results   Component Value Date    EGFR 59 12/23/2022    EGFR 42 03/21/2022    CREATININE 0 87 12/23/2022    CREATININE 1 16 03/21/2022    CREATININE 1 12 11/25/2015   · creat stable  · monitor

## 2022-12-24 NOTE — ASSESSMENT & PLAN NOTE
· Presented for evaluation of increased confusion  · This is in the setting of elevated BP, COVID-19, and sedating medications  · CT Brain (12/23): No intracranial hemorrhage or calvarial fracture   2 2 x 1 8 cm extra-axial soft tissue mass with calcifications in the right temporal region abutting the calvarium   Based on appearance this likely represents a meningioma   No significant mass effect or midline shift  Mild chronic microvascular ischemic changes     · Will continue home Buspar 5mg BID  · Decrease Klonopin dose to 0 25mg BID PRN and hold home Tramadol  · Supportive treatment for COVID and BP management as below

## 2022-12-24 NOTE — ASSESSMENT & PLAN NOTE
· BP elevated at 215/91 which has improved to 161/74  · CT brain as above  · Begin Amlodipine 5mg daily  · Monitor BP trends while admitted and adjust medications as indicated

## 2022-12-24 NOTE — ASSESSMENT & PLAN NOTE
· Per family at bedside patient has had multiple falls over the last few weeks  · PT/OT eval  · Case management for discharge planning

## 2022-12-24 NOTE — UTILIZATION REVIEW
Initial Clinical Review    Admission: Date/Time/Statement:   Admission Orders (From admission, onward)     Ordered        12/23/22 1815  INPATIENT ADMISSION  Once                      Orders Placed This Encounter   Procedures   • INPATIENT ADMISSION     Standing Status:   Standing     Number of Occurrences:   1     Order Specific Question:   Level of Care     Answer:   Med Surg [16]     Order Specific Question:   Estimated length of stay     Answer:   More than 2 Midnights     Order Specific Question:   Certification     Answer:   I certify that inpatient services are medically necessary for this patient for a duration of greater than two midnights  See H&P and MD Progress Notes for additional information about the patient's course of treatment  ED Arrival Information     Expected   -    Arrival   12/23/2022 15:28    Acuity   Urgent            Means of arrival   Stretcher    Escorted by   [facility]    Admission type   Emergency            Arrival complaint   Altered mental status           Chief Complaint   Patient presents with   • Multiple Falls     2 falls today, patient denies head strike and LOC   • Altered Mental Status     Per EMS, family reports AMS       Initial Presentation: 80 y o  female presents to ED via  EMS  From home  With increased confusion and falls over the past few days  EMS called earlier in the day, patient refused transfused  Found later in the day down again, family called EMS  Son  Currently  Admitted with COVID  Had a fall last week, was not able to get OOB the day before admission,  Complains of weakness  PMH  Is  Anxiety, CKD  Stage 3  Now  With + Covid in ED  CXR  NAD>   Ct brain unremarkable  BP   215/91, improved to  161/74  Admit  Ip with  Acute Encephalopathy, Hypertensive urgency, COVID  19 and plan is  Monitor labs and vital signs, no indication for remdesivir or decadron, supportive care and continue home meds         Date:    12/24       Day 2:   Mental status slightly improved  Needs PT/OT  Continue current meds  Encephalopathy  Likely  Secondary to meds  Vs  Acute viral  Illness  ED Triage Vitals   Temperature Pulse Respirations Blood Pressure SpO2   12/23/22 1539 12/23/22 1530 12/23/22 1530 12/23/22 1533 12/23/22 1530   97 6 °F (36 4 °C) 77 17 (!) 215/91 96 %      Temp Source Heart Rate Source Patient Position - Orthostatic VS BP Location FiO2 (%)   12/23/22 1539 12/23/22 1530 12/23/22 1530 12/23/22 2155 --   Oral Monitor Sitting Left arm       Pain Score       12/23/22 1530       No Pain          Wt Readings from Last 1 Encounters:   12/23/22 47 9 kg (105 lb 9 6 oz)     Additional Vital Signs:   98 1 °F (36 7 °C) 78 -- 143/68 97 95 % -- --    12/24/22 0400 97 6 °F (36 4 °C) 85 18 111/59 79 96 % -- Lying   12/24/22 0300 -- 80 -- 113/55 79 96 % -- --   12/24/22 0200 -- 81 -- 123/60 87 95 % -- --   12/24/22 0100 -- 84 -- 148/66 95 96 % -- --   12/24/22 0000 97 5 °F (36 4 °C) 86 18 137/61 88 96 % None (Room air) Lying   12/23/22 2300 -- 81 -- 154/75 105 96 % -- --   12/23/22 2210 -- 72 -- 181/77 Abnormal  110 97 % -- --   12/23/22 2200 -- 78 -- 181/77 Abnormal  110 96 % -- --   12/23/22 2155 -- 87 -- 175/86 Abnormal  124 95 % None (Room air) Lying   12/23/22 2142 98 1 °F (36 7 °C) -- 20 -- -- -- -- --   12/23/22 1802 -- -- -- 161/74 -- -- -- --   12/23/22 1800 -- 81 15 161/74 106 93 % None (Room air) Lying   12/23/22 1539 97 6 °F (36 4 °C) -- -- -- -- -- -- --   12/23/22 1533 -- -- -- 215/91 Abnormal  -- -- -- --   12/23/22 1530 -- 77 17 -- -- 96 % None (Room air) Sitting       Pertinent Labs/Diagnostic Test Results:   CT head wo contrast   Final Result by Marva Mckee MD (12/23 2561)      No intracranial hemorrhage or calvarial fracture  2 2 x 1 8 cm extra-axial soft tissue mass with calcifications in the right temporal region abutting the calvarium  Based on appearance this likely represents a meningioma    No significant mass effect or midline shift  Mild chronic microvascular ischemic changes  The study was marked in [facility] for immediate notification  Workstation performed: [id number]         XR chest portable   ED Interpretation by Melony Dsouza MD (12/23 1700)   ED wet read: No acute cardiopulmonary process noted      Final Result by Kelin Paz DO (12/23 1832)      No acute cardiopulmonary disease                    Workstation performed: [id number]           Results from last 7 days   Lab Units 12/23/22  1553   SARS-COV-2  Positive*     Results from last 7 days   Lab Units 12/24/22  0509 12/23/22  1553   WBC Thousand/uL 8 02 15 29*   HEMOGLOBIN g/dL 11 1* 13 0   HEMATOCRIT % 33 4* 39 6   PLATELETS Thousands/uL 195 223   NEUTROS ABS Thousands/µL  --  13 11*         Results from last 7 days   Lab Units 12/24/22  0509 12/23/22  1553   SODIUM mmol/L 137 140   POTASSIUM mmol/L 3 1* 3 6   CHLORIDE mmol/L 103 102   CO2 mmol/L 24 26   ANION GAP mmol/L 10 12   BUN mg/dL 15 15   CREATININE mg/dL 0 78 0 87   EGFR ml/min/1 73sq m 67 59   CALCIUM mg/dL 9 0 10 0   MAGNESIUM mg/dL 1 9  --      Results from last 7 days   Lab Units 12/23/22  1557 12/23/22  1553   AST U/L  --  28   ALT U/L  --  17   ALK PHOS U/L  --  62   TOTAL PROTEIN g/dL  --  7 7   ALBUMIN g/dL  --  4 5   TOTAL BILIRUBIN mg/dL  --  1 00   AMMONIA umol/L 30  --      Results from last 7 days   Lab Units 12/24/22  1057   POC GLUCOSE mg/dl 100     Results from last 7 days   Lab Units 12/24/22  0509 12/23/22  1553   GLUCOSE RANDOM mg/dL 117 112               Results from last 7 days   Lab Units 12/23/22  2044   D-DIMER QUANTITATIVE ug/ml FEU 1 03*         Results from last 7 days   Lab Units 12/23/22  1553   TSH 3RD GENERATON uIU/mL 0 448*     Results from last 7 days   Lab Units 12/24/22  0509 12/23/22  2044   PROCALCITONIN ng/ml 0 06 0 05               Results from last 7 days   Lab Units 12/23/22  2044   CRP mg/L 21 5*             Results from last 7 days   Lab Units 12/23/22  1708   CLARITY UA  Clear   COLOR UA  Mary Jane*   SPEC GRAV UA  1 025   PH UA  6 0   GLUCOSE UA mg/dl Negative   KETONES UA mg/dl 15 (1+)*   BLOOD UA  2+*   PROTEIN UA mg/dl 2+*   NITRITE UA  Negative   BILIRUBIN UA  Negative   UROBILINOGEN UA E U /dl 0 2   LEUKOCYTES UA  Negative   WBC UA /hpf 0-1   RBC UA /hpf 0-1   BACTERIA UA /hpf None Seen   EPITHELIAL CELLS WET PREP /hpf Occasional   MUCUS THREADS  Moderate*     Results from last 7 days   Lab Units 12/23/22  1553   INFLUENZA A PCR  Negative   INFLUENZA B PCR  Negative   RSV PCR  Negative                     ED Treatment:   Medication Administration from 12/23/2022 1528 to 12/23/2022 2111       Date/Time Order Dose Route Action Comments     12/23/2022 2042 EST busPIRone (BUSPAR) tablet 5 mg 5 mg Oral Given --     12/23/2022 2047 EST enoxaparin (LOVENOX) subcutaneous injection 30 mg 30 mg Subcutaneous Given --     12/23/2022 2040 EST cefTRIAXone (ROCEPHIN) IVPB (premix in dextrose) 1,000 mg 50 mL 1,000 mg Intravenous New Bag --     12/23/2022 2048 EST dextrose 5 % and sodium chloride 0 45 % infusion 75 mL/hr Intravenous New Bag --          Present on Admission:  • Acute encephalopathy  • COVID-19  • Meningioma (HCC)  • Hypertensive urgency  • Anxiety  • Stage 3b chronic kidney disease (HCC)  • Hyperthyroidism      Admitting Diagnosis: Acute encephalopathy [G93 40]  Altered mental status, unspecified [R41 82]  COVID-19 [U07 1]  Age/Sex: 80 y o  female  Admission Orders:  Scheduled Medications:  amLODIPine, 5 mg, Oral, Daily  aspirin, 81 mg, Oral, Daily  busPIRone, 5 mg, Oral, BID  cefTRIAXone, 1,000 mg, Intravenous, Q24H  enoxaparin, 30 mg, Subcutaneous, Q12H BACILIO      Continuous IV Infusions:     PRN Meds:  acetaminophen, 650 mg, Oral, Q6H PRN  clonazePAM, 0 25 mg, Oral, BID PRN  hydrALAZINE, 5 mg, Intravenous, Q6H PRN          Network Utilization Review Department  ATTENTION: Please call with any questions or concerns to [phone] and carefully listen to the prompts so that you are directed to the right person  All voicemails are confidential   Sheyla Castle all requests for admission clinical reviews, approved or denied determinations and any other requests to dedicated fax number below belonging to the campus where the patient is receiving treatment   List of dedicated fax numbers for the Facilities:  [address] DENIALS (Administrative/Medical Necessity) [phone]   [address] (Maternity/NICU/Pediatrics) [phone]   2 Yesika Montgomery [phone]   [facility] [phone]   [address] [place] [address] [facility] [phone]   1553 [facility] 134 815 [facility] [phone]

## 2022-12-24 NOTE — ASSESSMENT & PLAN NOTE
Lab Results   Component Value Date    EGFR 67 12/24/2022    EGFR 59 12/23/2022    EGFR 42 03/21/2022    CREATININE 0 78 12/24/2022    CREATININE 0 87 12/23/2022    CREATININE 1 16 03/21/2022   · Creatinine at baseline  · Will continue routine lab monitoring

## 2022-12-24 NOTE — PLAN OF CARE
Problem: Prexisting or High Potential for Compromised Skin Integrity  Goal: Skin integrity is maintained or improved  Description: INTERVENTIONS:  - Identify patients at risk for skin breakdown  - Assess and monitor skin integrity  - Assess and monitor nutrition and hydration status  - Monitor labs   - Assess for incontinence   - Turn and reposition patient  - Assist with mobility/ambulation  - Relieve pressure over bony prominences  - Avoid friction and shearing  - Provide appropriate hygiene as needed including keeping skin clean and dry  - Evaluate need for skin moisturizer/barrier cream  - Collaborate with interdisciplinary team   - Patient/family teaching  - Consider wound care consult   Outcome: Not Progressing     Problem: MOBILITY - ADULT  Goal: Maintain or return to baseline ADL function  Description: INTERVENTIONS:  -  Assess patient's ability to carry out ADLs; assess patient's baseline for ADL function and identify physical deficits which impact ability to perform ADLs (bathing, care of mouth/teeth, toileting, grooming, dressing, etc )  - Assess/evaluate cause of self-care deficits   - Assess range of motion  - Assess patient's mobility; develop plan if impaired  - Assess patient's need for assistive devices and provide as appropriate  - Encourage maximum independence but intervene and supervise when necessary  - Involve family in performance of ADLs  - Assess for home care needs following discharge   - Consider OT consult to assist with ADL evaluation and planning for discharge  - Provide patient education as appropriate  Outcome: Not Progressing

## 2022-12-24 NOTE — ASSESSMENT & PLAN NOTE
· BP significantly elevated in the ER with SBP greater than 200  · Pressure has gradually improved  · Continue Norvasc 5 mg daily  · Monitor BP trends while admitted and adjust medications as indicated

## 2022-12-24 NOTE — ASSESSMENT & PLAN NOTE
· Son is also COVID positive and admitted to the hospital  · Senia + 12/23/2022  · CXR (12/23): No acute cardiopulmonary disease    · As she is not requiring supplemental oxygen, will not begin Remdesivir or Decadron  · Further supportive care

## 2022-12-24 NOTE — H&P
[facility] 7/30/1933, 80 y o  female MRN: [id number]  Unit/Bed#: ANNA Encounter: [id number]  Primary Care Provider: Mariel Medina MD   Date and time admitted to hospital: 12/23/2022  3:35 PM    * Acute encephalopathy  Assessment & Plan  · Presented for evaluation of increased confusion  · This is in the setting of elevated BP, COVID-19, and sedating medications  · CT Brain (12/23): No intracranial hemorrhage or calvarial fracture   2 2 x 1 8 cm extra-axial soft tissue mass with calcifications in the right temporal region abutting the calvarium   Based on appearance this likely represents a meningioma   No significant mass effect or midline shift  Mild chronic microvascular ischemic changes  · Will continue home Buspar 5mg BID  · Decrease Klonopin dose to 0 25mg BID PRN and hold home Tramadol  · Supportive treatment for COVID and BP management as below    COVID-19  Assessment & Plan  · Son is also COVID positive and admitted to the hospital  · COVID + 12/23/2022  · CXR (12/23): No acute cardiopulmonary disease    · As she is not requiring supplemental oxygen, will not begin Remdesivir or Decadron  · Further supportive care    Hypertensive urgency  Assessment & Plan  · BP elevated at 215/91 which has improved to 161/74  · CT brain as above  · Begin Amlodipine 5mg daily  · Monitor BP trends while admitted and adjust medications as indicated    Stage 3b chronic kidney disease [facility])  Assessment & Plan  Lab Results   Component Value Date    EGFR 59 12/23/2022    EGFR 42 03/21/2022    CREATININE 0 87 12/23/2022    CREATININE 1 16 03/21/2022    CREATININE 1 12 11/25/2015   · creat stable  · monitor    Meningioma ([facility] [address] )  Assessment & Plan  · This is a previously known finding  · CT as above    Hyperthyroidism  Assessment & Plan  · In record, no medication   · TSH 0 448, T4 free pending    Anxiety  Assessment & Plan  · Continue home Buspar and Klonopin (at reduced dose as above)    VTE Pharmacologic Prophylaxis: VTE Score: 4 Moderate Risk (Score 3-4) - Pharmacological DVT Prophylaxis Ordered: enoxaparin (Lovenox)  Code Status: DNR/DNI    Anticipated Length of Stay: Patient will be admitted on an inpatient basis with an anticipated length of stay of greater than 2 midnights secondary to covid, supportive care  Chief Complaint: altered mental status    History of Present Illness:  Catrina Maldonado is a 80 y o  female with a PMH of anxiety who presents with altered mental status  Per Critical care discussion with family, patient with increased confusion at home over past few days and has had falls  EMS called this morning and patient declined transfer as they felt patient was oriented  Daughter in law came from seeing her  (patient's son) and when she returned home found patient down again and EMS called and brought in to ED for evaluation  Son is POA, he is in ICU with Covid, son had symptoms for about 2 weeks  Patient reports she had a fall last week, yesterday was not able to get out of bed, today she had a fall and did not want to go to hospital and then had another fall and came to ED  Notes weakness, reports eating OK  Review of Systems:  Review of Systems   Unable to perform ROS: Mental status change   Constitutional: Positive for fatigue  Negative for chills and fever  HENT: Negative for rhinorrhea, sore throat and trouble swallowing  Eyes: Negative for discharge and redness  Respiratory: Negative for cough and shortness of breath  Cardiovascular: Negative for chest pain and leg swelling  Gastrointestinal: Negative for abdominal pain, diarrhea, nausea and vomiting  Genitourinary: Negative for dysuria and hematuria  Musculoskeletal: Negative for back pain and neck pain  Neurological: Positive for weakness  Negative for headaches  Psychiatric/Behavioral: Negative for agitation and confusion         Past Medical and Surgical History:   Past Medical History:   Diagnosis Date   • Anxiety    • Arthritis    • Hyperlipidemia    • Hypertension    • Hyperthyroidism        Past Surgical History:   Procedure Laterality Date   • ABDOMINAL SURGERY      peritoneal    • APPENDECTOMY     • HYSTERECTOMY         Meds/Allergies:  Prior to Admission medications    Medication Sig Start Date End Date Taking? Authorizing Provider   aspirin 81 MG tablet Take 1 tablet by mouth daily    Historical Provider, MD   busPIRone (BUSPAR) 5 mg tablet Take 1 tablet (5 mg total) by mouth 2 (two) times a day 11/22/22   Keyla Quinn MD   clonazePAM (KlonoPIN) 1 mg tablet 1/2 po tid prn anxiety 12/5/22   Keyla Quinn MD   meloxicam (Mobic) 7 5 mg tablet Take 1 tablet (7 5 mg total) by mouth daily 11/30/22   Keyla Quinn MD   naloxone [facility]) 4 mg/0 1 mL nasal spray Administer 1 spray into a nostril if takes extra Tramadol  7/29/20   Keyla Quinn MD   traMADol (Ultram) 50 mg tablet Take 1 tablet (50 mg total) by mouth every 8 (eight) hours as needed for moderate pain 11/17/22   Keyla Quinn MD     I have reviewed home medications with patient personally      Allergies: No Known Allergies    Social History:  Marital Status: /Civil Union   Occupation: retired  Patient Pre-hospital Living Situation: Home  Patient Pre-hospital Level of Mobility: walks with walker at home, wheelchair long distance  Patient Pre-hospital Diet Restrictions: none  Substance Use History:   Social History     Substance and Sexual Activity   Alcohol Use No     Social History     Tobacco Use   Smoking Status Former   • Years: 15 00   • Types: Cigarettes   Smokeless Tobacco Never     Social History     Substance and Sexual Activity   Drug Use No       Family History:  Family History   Problem Relation Age of Onset   • Heart disease Father    • Pulmonary embolism Father    • Dementia Sister    • Lung cancer Other    • Diabetes Mother        Physical Exam:     Vitals:   Blood Pressure: 161/74 (12/23/22 1802)  Pulse: 81 (12/23/22 1800)  Temperature: 97 6 °F (36 4 °C) (12/23/22 1539)  Temp Source: Oral (12/23/22 1539)  Respirations: 15 (12/23/22 1800)  Height: 4' 11" (149 9 cm) (12/23/22 1530)  Weight - Scale: 52 kg (114 lb 10 2 oz) (12/23/22 1530)  SpO2: 93 % (12/23/22 1800)    Physical Exam  Vitals reviewed  Constitutional:       Appearance: She is ill-appearing  Comments: Frail elderly  female, sleeping, easily arouseable   HENT:      Head: Normocephalic and atraumatic  Nose: Nose normal    Eyes:      General:         Right eye: No discharge  Left eye: No discharge  Extraocular Movements: Extraocular movements intact  Conjunctiva/sclera: Conjunctivae normal    Cardiovascular:      Rate and Rhythm: Normal rate and regular rhythm  Pulmonary:      Effort: Pulmonary effort is normal  No respiratory distress  Breath sounds: Normal breath sounds  No wheezing  Abdominal:      General: Bowel sounds are normal  There is no distension  Palpations: Abdomen is soft  Tenderness: There is no abdominal tenderness  There is no guarding  Musculoskeletal:         General: No swelling or tenderness  Normal range of motion  Cervical back: Normal range of motion  Right lower leg: No edema  Left lower leg: No edema  Skin:     General: Skin is warm and dry  Capillary Refill: Capillary refill takes less than 2 seconds  Neurological:      General: No focal deficit present  Mental Status: Mental status is at baseline  Motor: Weakness present     Psychiatric:         Mood and Affect: Mood normal          Behavior: Behavior normal           Additional Data:     Lab Results:  Results from last 7 days   Lab Units 12/23/22  1553   WBC Thousand/uL 15 29*   HEMOGLOBIN g/dL 13 0   HEMATOCRIT % 39 6   PLATELETS Thousands/uL 223   NEUTROS PCT % 86*   LYMPHS PCT % 7*   MONOS PCT % 7   EOS PCT % 0     Results from last 7 days   Lab Units 12/23/22  1553 SODIUM mmol/L 140   POTASSIUM mmol/L 3 6   CHLORIDE mmol/L 102   CO2 mmol/L 26   BUN mg/dL 15   CREATININE mg/dL 0 87   ANION GAP mmol/L 12   CALCIUM mg/dL 10 0   ALBUMIN g/dL 4 5   TOTAL BILIRUBIN mg/dL 1 00   ALK PHOS U/L 62   ALT U/L 17   AST U/L 28   GLUCOSE RANDOM mg/dL 112       Lines/Drains:  Invasive Devices     Peripheral Intravenous Line  Duration           Peripheral IV 12/23/22 Left;Ventral (anterior) Forearm <1 day    Peripheral IV 12/23/22 Right Antecubital <1 day              Imaging: Reviewed radiology reports from this admission including: chest xray and CT head  CT head wo contrast   Final Result by Axel Griffith MD (12/23 1731)      No intracranial hemorrhage or calvarial fracture  2 2 x 1 8 cm extra-axial soft tissue mass with calcifications in the right temporal region abutting the calvarium  Based on appearance this likely represents a meningioma  No significant mass effect or midline shift  Mild chronic microvascular ischemic changes  The study was marked in [facility] for immediate notification  Workstation performed: [id number]         XR chest portable   ED Interpretation by Porsha Carson MD (12/23 1700)   ED wet read: No acute cardiopulmonary process noted      Final Result by Aviva Petersen DO (12/23 1832)      No acute cardiopulmonary disease  Workstation performed: [id number]             ** Please Note: This note has been constructed using a voice recognition system   **

## 2022-12-25 LAB
ANION GAP SERPL CALCULATED.3IONS-SCNC: 7 MMOL/L (ref 4–13)
BASOPHILS # BLD AUTO: 0.04 THOUSANDS/ÂΜL (ref 0–0.1)
BASOPHILS NFR BLD AUTO: 1 % (ref 0–1)
BUN SERPL-MCNC: 16 MG/DL (ref 5–25)
CALCIUM SERPL-MCNC: 9.2 MG/DL (ref 8.4–10.2)
CHLORIDE SERPL-SCNC: 105 MMOL/L (ref 96–108)
CO2 SERPL-SCNC: 27 MMOL/L (ref 21–32)
CREAT SERPL-MCNC: 0.92 MG/DL (ref 0.6–1.3)
CRP SERPL QL: 12.1 MG/L
EOSINOPHIL # BLD AUTO: 0.07 THOUSAND/ÂΜL (ref 0–0.61)
EOSINOPHIL NFR BLD AUTO: 1 % (ref 0–6)
ERYTHROCYTE [DISTWIDTH] IN BLOOD BY AUTOMATED COUNT: 13.4 % (ref 11.6–15.1)
GFR SERPL CREATININE-BSD FRML MDRD: 55 ML/MIN/1.73SQ M
GLUCOSE SERPL-MCNC: 93 MG/DL (ref 65–140)
HCT VFR BLD AUTO: 36.8 % (ref 34.8–46.1)
HGB BLD-MCNC: 11.7 G/DL (ref 11.5–15.4)
IMM GRANULOCYTES # BLD AUTO: 0.02 THOUSAND/UL (ref 0–0.2)
IMM GRANULOCYTES NFR BLD AUTO: 0 % (ref 0–2)
LYMPHOCYTES # BLD AUTO: 1.24 THOUSANDS/ÂΜL (ref 0.6–4.47)
LYMPHOCYTES NFR BLD AUTO: 18 % (ref 14–44)
MCH RBC QN AUTO: 31.3 PG (ref 26.8–34.3)
MCHC RBC AUTO-ENTMCNC: 31.8 G/DL (ref 31.4–37.4)
MCV RBC AUTO: 98 FL (ref 82–98)
MONOCYTES # BLD AUTO: 0.59 THOUSAND/ÂΜL (ref 0.17–1.22)
MONOCYTES NFR BLD AUTO: 9 % (ref 4–12)
NEUTROPHILS # BLD AUTO: 4.89 THOUSANDS/ÂΜL (ref 1.85–7.62)
NEUTS SEG NFR BLD AUTO: 71 % (ref 43–75)
NRBC BLD AUTO-RTO: 0 /100 WBCS
PLATELET # BLD AUTO: 184 THOUSANDS/UL (ref 149–390)
PMV BLD AUTO: 11.1 FL (ref 8.9–12.7)
POTASSIUM SERPL-SCNC: 3.9 MMOL/L (ref 3.5–5.3)
PROCALCITONIN SERPL-MCNC: 0.06 NG/ML
RBC # BLD AUTO: 3.74 MILLION/UL (ref 3.81–5.12)
SODIUM SERPL-SCNC: 139 MMOL/L (ref 135–147)
WBC # BLD AUTO: 6.85 THOUSAND/UL (ref 4.31–10.16)

## 2022-12-25 RX ADMIN — AMLODIPINE BESYLATE 5 MG: 5 TABLET ORAL at 08:41

## 2022-12-25 RX ADMIN — ENOXAPARIN SODIUM 30 MG: 100 INJECTION SUBCUTANEOUS at 20:16

## 2022-12-25 RX ADMIN — ASPIRIN 81 MG: 81 TABLET, COATED ORAL at 08:41

## 2022-12-25 RX ADMIN — ENOXAPARIN SODIUM 30 MG: 100 INJECTION SUBCUTANEOUS at 08:41

## 2022-12-25 RX ADMIN — BUSPIRONE HYDROCHLORIDE 5 MG: 5 TABLET ORAL at 19:05

## 2022-12-25 RX ADMIN — BUSPIRONE HYDROCHLORIDE 5 MG: 5 TABLET ORAL at 08:41

## 2022-12-25 RX ADMIN — CEFTRIAXONE 1000 MG: 1 INJECTION, SOLUTION INTRAVENOUS at 20:17

## 2022-12-25 NOTE — PLAN OF CARE
Problem: PHYSICAL THERAPY ADULT  Goal: Performs mobility at highest level of function for planned discharge setting  See evaluation for individualized goals  Description: Treatment/Interventions: Functional transfer training, LE strengthening/ROM, Therapeutic exercise, Endurance training, Bed mobility, Gait training, Equipment eval/education          See flowsheet documentation for full assessment, interventions and recommendations  Outcome: Progressing  Note: Prognosis: Fair     Assessment: Pt is 80 y o  female seen for PT evaluation s/p admit to [address] on 12/23/2022 w/ Acute encephalopathy  PT consulted to assess pt's functional mobility and d/c needs  Order placed for PT eval and tx, w/ activity as tolerated order  Pt agreeable to PT  session upon arrival, pt found supine in bed  PTA, pt was independent w/ all functional mobility w/ RW, ambulates household distances, lives w/ son in 1 level home and retired  Pt to benefit from continued PT tx to address deficits and maximize level of functional independent mobility and consistency  From PT/mobility standpoint, recommendation at time of d/c would be post acute rehabilitation services pending progress in order to facilitate return to PLOF  Upon conclusion pt  supine in bed  Complexity: Comorbidities affecting pt's physical performance at time of assessment include: anxiety and COVID infection and meningoma  Personal factors affecting pt at time of IE include: inability to ambulate household distances, inability to navigate level surfaces w/o external assistance, decreased cognition, positive fall history and anxiety  Please find objective findings from PT assessment regarding body systems outlined above with impairments and limitations including weakness, impaired balance, decreased endurance, decreased activity tolerance, decreased functional mobility tolerance, decreased safety awareness, impaired judgement and fall risk    Pt's clinical presentation is currently unstable/unpredictable seen in pt's presentation of reports of dizziness with activity, active infection, polypharmacy and abnormal urine  The patient's AM-PAC Basic Mobility Inpatient Short Form Raw Score is 8  A Raw score of less than or equal to 16 suggests the patient may benefit from discharge to post-acute rehabilitation services  Please also refer to the recommendation of the Physical Therapist for safe discharge planning  Barriers to Discharge: Inaccessible home environment     PT Discharge Recommendation: Post acute rehabilitation services    See flowsheet documentation for full assessment

## 2022-12-25 NOTE — PROGRESS NOTES
1945: Pt bladder scanned for 337  Notified JOSIE Chilel  Order received for urinary retention protocol  Pt straight catheterized for 500 ml doug urine with assistance of day nurse, Sandro Weinstein

## 2022-12-25 NOTE — PROGRESS NOTES
[facility]  66   Progress Note - Adama Quant 7/30/1933, 80 y o  female MRN: [id number]  Unit/Bed#: ICU 10-01 Encounter: [id number]  Primary Care Provider: Zenaida Umana MD   Date and time admitted to hospital: 12/23/2022  3:35 PM    * Acute encephalopathy  Assessment & Plan  · CT head with finding of meningioma which has been seen on previous imaging  · Possibly secondary to medications versus acute viral illness  · Will continue supportive care  · Home Klonopin dose reduced  · Nonfocal neuro exam at this time    COVID-19  Assessment & Plan  · Son is also COVID positive and admitted to the hospital  · COVID + 12/23/2022  · CXR (12/23): No acute cardiopulmonary disease    · As she is not requiring supplemental oxygen, will not begin Remdesivir or Decadron  · Further supportive care    Frequent falls  Assessment & Plan  · Per family at bedside patient has had multiple falls over the last few weeks  · PT/OT eval  · Case management for discharge planning    Hypertensive urgency  Assessment & Plan  · BP significantly elevated in the ER with SBP greater than 200  · Pressure has gradually improved  · Continue Norvasc 5 mg daily  · Monitor BP trends while admitted and adjust medications as indicated    Stage 3b chronic kidney disease ([facility] [address] )  Assessment & Plan  Lab Results   Component Value Date    EGFR 55 12/25/2022    EGFR 67 12/24/2022    EGFR 59 12/23/2022    CREATININE 0 92 12/25/2022    CREATININE 0 78 12/24/2022    CREATININE 0 87 12/23/2022   · Creatinine at baseline  · Will continue routine lab monitoring    Meningioma [facility])  Assessment & Plan  · This is a previously known finding  · CT imaging results appreciated    Hyperthyroidism  Assessment & Plan  · TSH 0 448, T4 free within normal limits    Anxiety  Assessment & Plan  · Continue home Buspar and Klonopin (at reduced dose)      VTE Prophylaxis:  Enoxaparin (Lovenox)    Patient Centered Rounds: I have performed bedside rounds with nursing staff today  Discussions with Specialists or Other Care Team Provider: yes  Education and Discussions with Family / Patient: Attempted to call patient's spouse Jaron Celeste over the phone regarding plan of care    Current Length of Stay: 2 day(s)    Current Patient Status: Inpatient   Certification Statement: The patient will continue to require additional inpatient hospital stay due to pending placement    Discharge Plan: CM to work on rehab placement    Code Status: Level 3 - DNAR and DNI    Subjective:   No overnight events noted    Objective:     Vitals:   Temp (24hrs), Av 9 °F (36 6 °C), Min:97 °F (36 1 °C), Max:98 4 °F (36 9 °C)    Temp:  [97 °F (36 1 °C)-98 4 °F (36 9 °C)] 98 3 °F (36 8 °C)  HR:  [63-84] 64  Resp:  [18] 18  BP: (150-183)/(65-77) 159/67  SpO2:  [90 %-96 %] 95 %  Body mass index is 21 33 kg/m²  Input and Output Summary (last 24 hours): Intake/Output Summary (Last 24 hours) at 2022 1243  Last data filed at 2022 2130  Gross per 24 hour   Intake 50 ml   Output 500 ml   Net -450 ml       Physical Exam:   Physical Exam  Constitutional:       General: She is not in acute distress  HENT:      Head: Normocephalic and atraumatic  Nose: Nose normal       Mouth/Throat:      Mouth: Mucous membranes are moist    Eyes:      Extraocular Movements: Extraocular movements intact  Conjunctiva/sclera: Conjunctivae normal    Cardiovascular:      Rate and Rhythm: Normal rate and regular rhythm  Pulmonary:      Effort: Pulmonary effort is normal  No respiratory distress  Abdominal:      Palpations: Abdomen is soft  Tenderness: There is no abdominal tenderness  Musculoskeletal:         General: Normal range of motion  Cervical back: Normal range of motion and neck supple  Skin:     General: Skin is warm and dry  Neurological:      General: No focal deficit present  Mental Status: She is alert  Mental status is at baseline        Cranial Nerves: No cranial nerve deficit  Psychiatric:         Mood and Affect: Mood normal          Additional Data:     Labs:    Results from last 7 days   Lab Units 12/25/22  0516   WBC Thousand/uL 6 85   HEMOGLOBIN g/dL 11 7   HEMATOCRIT % 36 8   PLATELETS Thousands/uL 184   NEUTROS PCT % 71   LYMPHS PCT % 18   MONOS PCT % 9   EOS PCT % 1     Results from last 7 days   Lab Units 12/25/22  0516 12/24/22  0509 12/23/22  1553   SODIUM mmol/L 139   < > 140   POTASSIUM mmol/L 3 9   < > 3 6   CHLORIDE mmol/L 105   < > 102   CO2 mmol/L 27   < > 26   BUN mg/dL 16   < > 15   CREATININE mg/dL 0 92   < > 0 87   CALCIUM mg/dL 9 2   < > 10 0   ALK PHOS U/L  --   --  62   ALT U/L  --   --  17   AST U/L  --   --  28    < > = values in this interval not displayed  Results from last 7 days   Lab Units 12/24/22  1057   POC GLUCOSE mg/dl 100           * I Have Reviewed All Lab Data Listed Above  * Additional Pertinent Lab Tests Reviewed: ing[facility] Ad[facility]  Reviewed    Imaging:  Imaging Reports Reviewed Today Include: no new imaging    Recent Cultures (last 7 days):           Last 24 Hours Medication List:   Current Facility-Administered Medications   Medication Dose Route Frequency Provider Last Rate   • acetaminophen  650 mg Oral Q6H PRN [facility], DO     • amLODIPine  5 mg Oral Daily [facility], DO     • aspirin  81 mg Oral Daily [facility], DO     • busPIRone  5 mg Oral BID [facility], DO     • cefTRIAXone  1,000 mg Intravenous Q24H Margarito Aguilar PA-C 1,000 mg (12/24/22 2023)   • clonazePAM  0 25 mg Oral BID PRN [facility], DO     • enoxaparin  30 mg Subcutaneous Q12H [facility] Katrni Melissa PA-C     • hydrALAZINE  5 mg Intravenous Q6H PRN [facility], DO          Today, Patient Was Seen By: Dallas Pedroza MD    ** Please Note: Dictation voice to text software may have been used in the creation of this document   **

## 2022-12-25 NOTE — PLAN OF CARE
Problem: MOBILITY - ADULT  Goal: Maintains/Returns to pre admission functional level  Description: INTERVENTIONS:  - Perform BMAT or MOVE assessment daily    - Set and communicate daily mobility goal to care team and patient/family/caregiver  - Collaborate with rehabilitation services on mobility goals if consulted  - Perform Range of Motion 2 times a day  - Reposition patient every 2 hours    - Ambulate patient 2 times a day  - Out of bed to chair 2 times a day   - Out of bed for meals 2 times a day  - Out of bed for toileting  - Record patient progress and toleration of activity level   Outcome: Not Progressing     Problem: GENITOURINARY - ADULT  Goal: Maintains or returns to baseline urinary function  Description: INTERVENTIONS:  - Assess urinary function  - Encourage oral fluids to ensure adequate hydration if ordered  - Administer IV fluids as ordered to ensure adequate hydration  - Administer ordered medications as needed  - Offer frequent toileting  - Follow urinary retention protocol if ordered  Outcome: Not Progressing     Problem: GENITOURINARY - ADULT  Goal: Absence of urinary retention  Description: INTERVENTIONS:  - Assess patient’s ability to void and empty bladder  - Monitor I/O  - Bladder scan as needed  - Discuss with physician/AP medications to alleviate retention as needed  - Discuss catheterization for long term situations as appropriate  Outcome: Not Progressing

## 2022-12-25 NOTE — ASSESSMENT & PLAN NOTE
Lab Results   Component Value Date    EGFR 55 12/25/2022    EGFR 67 12/24/2022    EGFR 59 12/23/2022    CREATININE 0 92 12/25/2022    CREATININE 0 78 12/24/2022    CREATININE 0 87 12/23/2022   · Creatinine at baseline  · Will continue routine lab monitoring

## 2022-12-25 NOTE — ASSESSMENT & PLAN NOTE
· CT head with finding of meningioma which has been seen on previous imaging  · Possibly secondary to medications versus acute viral illness  · Will continue supportive care  · Home Klonopin dose reduced  · Nonfocal neuro exam at this time

## 2022-12-25 NOTE — PHYSICAL THERAPY NOTE
PHYSICAL THERAPY EVALUATION  NAME:  Kyler Cea: 12/25/22    AGE:   80 y o   Mrn:   [id number]  ADMIT DX:  Acute encephalopathy [G93 40]  Altered mental status, unspecified [R41 82]  FMVSL-30 [U07 1]  Problem List:   Patient Active Problem List   Diagnosis    Anxiety    Arthritis    Hyperlipidemia    Hyperthyroidism    Meningioma ([facility] )    Stage 3b chronic kidney disease ([facility] )    Acute encephalopathy    COVID-19    Hypertensive urgency    Frequent falls       Past Medical History  Past Medical History:   Diagnosis Date    Anxiety     Arthritis     Hyperlipidemia     Hypertension     Hyperthyroidism        Past Surgical History  Past Surgical History:   Procedure Laterality Date    ABDOMINAL SURGERY      peritoneal     APPENDECTOMY      HYSTERECTOMY         Length Of Stay: 2  Performed at least 2 patient identifiers during session: Name and Fei Bautista       12/25/22 0917   PT Last Visit   PT Visit Date 12/25/22   Note Type   Note type Evaluation   Pain Assessment   Pain Assessment Tool 0-10   Pain Score No Pain   Restrictions/Precautions   Weight Bearing Precautions Per Order No   Braces or Orthoses   (none reported)   Other Precautions Contact/isolation; Airborne/isolation; Fall Risk;Hard of hearing;Visual impairment; Bed Alarm   Home Living   Type of [address] [place] [address] One level  (0 GARRY on side)   Bathroom Shower/Tub   (sponge bath at baseline)   [address] [place] [address]; Wheelchair-manual  (uses RW in home and w/c for long distances)   Prior Function   Level of [place] Independent with functional mobility; Independent with ADLs; Needs assistance with IADLS   Lives With Ama Cordero Help From Family   IADLs Family/Friend/Other provides transportation; Family/Friend/Other provides meals   Falls in the last 6 months 1 to 4   General   Family/Caregiver Present No   Cognition   Overall Cognitive Status Impaired   Arousal/Participation Alert   Orientation Level Oriented to person;Disoriented to place; Disoriented to time;Oriented to situation   Memory Decreased recall of recent events   Following Commands Follows one step commands with increased time or repetition   RLE Assessment   RLE Assessment X   Strength RLE   RLE Overall Strength 3+/5   LLE Assessment   LLE Assessment X   Strength LLE   LLE Overall Strength 3+/5   Vision-Basic Assessment   Current Vision   (decreased vision)   Coordination   Sensation WFL   Bed Mobility   Rolling R 3  Moderate assistance   Additional items Assist x 1;Bedrails;Verbal cues; Increased time required   Supine to Sit 2  Maximal assistance   Additional items Assist x 1;Verbal cues;LE management; Increased time required;HOB elevated; Bedrails   Sit to Supine 2  Maximal assistance   Additional items Assist x 1;Verbal cues;LE management; Increased time required;HOB elevated; Bedrails   Additional Comments pt sat EOB with Poor trunk control and reported dizziness that woud not subside; pt laid supine and BP [id number]; pt rested and instructed on proper breathing technique; attempted EOB again and once again pt reported dizziness that would not subside; pt required Max A to control trunk to sit EOB   Balance   Static Sitting Poor   Dynamic Sitting Poor -   Endurance Deficit   Endurance Deficit Yes   Endurance Deficit Description pt reporting dizzy with movement   Activity Tolerance   Activity Tolerance Patient limited by fatigue  (and dizziness)   Assessment   Prognosis Fair   Assessment Pt is 80 y o  female seen for PT evaluation s/p admit to [address] on 12/23/2022 w/ Acute encephalopathy  PT consulted to assess pt's functional mobility and d/c needs  Order placed for PT eval and tx, w/ activity as tolerated order  Pt agreeable to PT  session upon arrival, pt found supine in bed  PTA, pt was independent w/ all functional mobility w/ RW, ambulates household distances, lives w/ son in 1 level home and retired    Pt to benefit from continued PT tx to address deficits and maximize level of functional independent mobility and consistency  From PT/mobility standpoint, recommendation at time of d/c would be post acute rehabilitation services pending progress in order to facilitate return to PLOF  Upon conclusion pt  supine in bed  Complexity: Comorbidities affecting pt's physical performance at time of assessment include: anxiety and COVID infection and meningoma  Personal factors affecting pt at time of IE include: inability to ambulate household distances, inability to navigate level surfaces w/o external assistance, decreased cognition, positive fall history and anxiety  Please find objective findings from PT assessment regarding body systems outlined above with impairments and limitations including weakness, impaired balance, decreased endurance, decreased activity tolerance, decreased functional mobility tolerance, decreased safety awareness, impaired judgement and fall risk  Pt's clinical presentation is currently unstable/unpredictable seen in pt's presentation of reports of dizziness with activity, active infection, polypharmacy and abnormal urine  The patient's AM-PAC Basic Mobility Inpatient Short Form Raw Score is 8  A Raw score of less than or equal to 16 suggests the patient may benefit from discharge to post-acute rehabilitation services  Please also refer to the recommendation of the Physical Therapist for safe discharge planning  Barriers to Discharge Inaccessible home environment   Goals   Patient Goals to feel better   LTG Expiration Date 01/04/23   Long Term Goal #1 Pt will: Perform bed mobility tasks to modified I to improve ease of bed mobility  Perform transfers to modified I to improve ease of transfers  Perform ambulation with MI and RW for 250 feet to  increase Indep in home environment  Increase dynamic standing balance to F+ to decrease fall risk  Increase BLE strength to 4+/5 to improve functional mobility    Increase OOB activity tolerance to 10 minutes without s/s of exertion to decrease fall risk  Plan   Treatment/Interventions Functional transfer training;LE strengthening/ROM; Therapeutic exercise; Endurance training;Bed mobility;Gait training;Equipment eval/education   PT Frequency 3-5x/wk   Recommendation   PT Discharge Recommendation Post acute rehabilitation services   AM-PAC Basic Mobility Inpatient   Turning in Bed Without Bedrails 2   Lying on Back to Sitting on Edge of Flat Bed 2   Moving Bed to Chair 1   Standing Up From Chair 1   Walk in Room 1   Climb 3-5 Stairs 1   Basic Mobility Inpatient Raw Score 8   Highest Level Of Mobility   -HLM Goal 3: Sit at edge of bed   -HLM Achieved 3: Sit at edge of bed   Additional Treatment Session   Start Time 0926   End Time 0941   Treatment Assessment Therapeutic exercise to increase acitivty tolerance to improve functional mobility   Exercises   Glute Sets Supine;15 reps;AROM; Bilateral   Hip Flexion Supine;15 reps;AROM; Bilateral   Hip Abduction Supine;15 reps;AROM; Bilateral   Hip Adduction Supine;15 reps;AROM; Bilateral   Knee AROM Short Arc Quad Supine;15 reps;AROM; Bilateral   Ankle Pumps Supine;15 reps;AROM; Bilateral       Time In: 911  Time Out: 925  Total Evaluation Minutes: 9357 [facility],

## 2022-12-25 NOTE — NURSING NOTE
11:22 AM  Per order, OOB to chair with max assist of RN to transfer  Used under axilla facing patient and pivot and sit  Pt was very unsteady in attempting to sit up, kept leaning back, stating afraid of falling  Agree with PT notes, will need Acute Rehab  Prior to returning home

## 2022-12-26 RX ORDER — ALBUMIN, HUMAN INJ 5% 5 %
SOLUTION INTRAVENOUS
Status: DISPENSED
Start: 2022-12-26 | End: 2022-12-27

## 2022-12-26 RX ADMIN — CEFTRIAXONE 1000 MG: 1 INJECTION, SOLUTION INTRAVENOUS at 20:19

## 2022-12-26 RX ADMIN — ENOXAPARIN SODIUM 30 MG: 100 INJECTION SUBCUTANEOUS at 20:19

## 2022-12-26 RX ADMIN — BUSPIRONE HYDROCHLORIDE 5 MG: 5 TABLET ORAL at 08:36

## 2022-12-26 RX ADMIN — ACETAMINOPHEN 650 MG: 325 TABLET ORAL at 04:56

## 2022-12-26 RX ADMIN — BUSPIRONE HYDROCHLORIDE 5 MG: 5 TABLET ORAL at 17:25

## 2022-12-26 RX ADMIN — HYDRALAZINE HYDROCHLORIDE 5 MG: 20 INJECTION, SOLUTION INTRAMUSCULAR; INTRAVENOUS at 05:24

## 2022-12-26 RX ADMIN — ENOXAPARIN SODIUM 30 MG: 100 INJECTION SUBCUTANEOUS at 08:36

## 2022-12-26 RX ADMIN — AMLODIPINE BESYLATE 5 MG: 5 TABLET ORAL at 08:36

## 2022-12-26 RX ADMIN — ASPIRIN 81 MG: 81 TABLET, COATED ORAL at 08:36

## 2022-12-26 NOTE — PROGRESS NOTES
[facility] [address]  Progress Note - Pegge Lias 7/30/1933, 80 y o  female MRN: [id number]  Unit/Bed#: ICU 10-01 Encounter: [id number]  Primary Care Provider: Misti Arias MD   Date and time admitted to hospital: 12/23/2022  3:35 PM    * Acute encephalopathy  Assessment & Plan  · CT head with finding of meningioma which has been seen on previous imaging  · Possibly secondary to medications versus acute viral illness  · Will continue supportive care  · Home Klonopin dose reduced  · Nonfocal neuro exam at this time    COVID-19  Assessment & Plan  · Son is also COVID positive and admitted to the hospital  · COVID + 12/23/2022  · CXR (12/23): No acute cardiopulmonary disease    · As she is not requiring supplemental oxygen, will not begin Remdesivir or Decadron  · Will continue supportive care    Frequent falls  Assessment & Plan  · Per family at bedside patient has had multiple falls over the last few weeks  · PT/OT eval recommending rehab  · Case management for discharge planning    Hypertensive urgency  Assessment & Plan  · BP significantly elevated in the ER with SBP greater than 200  · Pressure has gradually improved  · Continue Norvasc 5 mg daily  · Monitor BP trends while admitted and adjust medications as indicated    Stage 3b chronic kidney disease ([facility] [address] )  Assessment & Plan  Lab Results   Component Value Date    EGFR 55 12/25/2022    EGFR 67 12/24/2022    EGFR 59 12/23/2022    CREATININE 0 92 12/25/2022    CREATININE 0 78 12/24/2022    CREATININE 0 87 12/23/2022   · Creatinine at baseline  · Will continue routine lab monitoring    Meningioma [facility])  Assessment & Plan  · This is a previously known finding  · CT imaging results appreciated    Hyperthyroidism  Assessment & Plan  · TSH 0 448, T4 free within normal limits    Anxiety  Assessment & Plan  · Continue home Buspar and Klonopin (at reduced dose)      VTE Prophylaxis:  Enoxaparin (Lovenox)    Patient Centered Rounds: I have performed bedside rounds with nursing staff today  Discussions with Specialists or Other Care Team Provider: yes  Education and Discussions with Family / Patient: Attempted to call patient's family at number provided in chart with no answer  Of note patient's son is also admitted to the hospital in the ICU for COVID    Current Length of Stay: 3 day(s)    Current Patient Status: Inpatient   Certification Statement: The patient will continue to require additional inpatient hospital stay due to COVID infection    Discharge Plan: Patient medically stable for discharge  Case management for rehab placement    Code Status: Level 3 - DNAR and DNI    Subjective:   No overnight events noted    Objective:     Vitals:   Temp (24hrs), Av 4 °F (36 9 °C), Min:97 9 °F (36 6 °C), Max:98 9 °F (37 2 °C)    Temp:  [97 9 °F (36 6 °C)-98 9 °F (37 2 °C)] 98 9 °F (37 2 °C)  HR:  [65-83] 83  Resp:  [16-20] 20  BP: (130-182)/(61-90) 130/63  SpO2:  [93 %-97 %] 95 %  Body mass index is 21 33 kg/m²  Input and Output Summary (last 24 hours): Intake/Output Summary (Last 24 hours) at 2022 1247  Last data filed at 2022 0516  Gross per 24 hour   Intake 50 ml   Output 450 ml   Net -400 ml       Physical Exam:   Physical Exam  Constitutional:       General: She is not in acute distress  Comments: Frail elderly female   HENT:      Head: Normocephalic and atraumatic  Nose: Nose normal       Mouth/Throat:      Mouth: Mucous membranes are moist    Eyes:      Extraocular Movements: Extraocular movements intact  Conjunctiva/sclera: Conjunctivae normal    Cardiovascular:      Rate and Rhythm: Normal rate and regular rhythm  Pulmonary:      Effort: Pulmonary effort is normal  No respiratory distress  Abdominal:      Palpations: Abdomen is soft  Tenderness: There is no abdominal tenderness  Musculoskeletal:         General: Normal range of motion  Cervical back: Normal range of motion and neck supple  Comments: Generalized weakness   Skin:     General: Skin is warm and dry  Neurological:      General: No focal deficit present  Mental Status: She is alert  Mental status is at baseline  Cranial Nerves: No cranial nerve deficit  Psychiatric:         Mood and Affect: Mood normal          Behavior: Behavior normal          Additional Data:     Labs:    Results from last 7 days   Lab Units 12/25/22  0516   WBC Thousand/uL 6 85   HEMOGLOBIN g/dL 11 7   HEMATOCRIT % 36 8   PLATELETS Thousands/uL 184   NEUTROS PCT % 71   LYMPHS PCT % 18   MONOS PCT % 9   EOS PCT % 1     Results from last 7 days   Lab Units 12/25/22  0516 12/24/22  0509 12/23/22  1553   SODIUM mmol/L 139   < > 140   POTASSIUM mmol/L 3 9   < > 3 6   CHLORIDE mmol/L 105   < > 102   CO2 mmol/L 27   < > 26   BUN mg/dL 16   < > 15   CREATININE mg/dL 0 92   < > 0 87   CALCIUM mg/dL 9 2   < > 10 0   ALK PHOS U/L  --   --  62   ALT U/L  --   --  17   AST U/L  --   --  28    < > = values in this interval not displayed  Results from last 7 days   Lab Units 12/24/22  1057   POC GLUCOSE mg/dl 100           * I Have Reviewed All Lab Data Listed Above  * Additional Pertinent Lab Tests Reviewed:  [facility] Admission  Reviewed    Imaging:  Imaging Reports Reviewed Today Include: No new imaging    Recent Cultures (last 7 days):           Last 24 Hours Medication List:   Current Facility-Administered Medications   Medication Dose Route Frequency Provider Last Rate   • acetaminophen  650 mg Oral Q6H PRN [facility] Aj, DO     • amLODIPine  5 mg Oral Daily [facility], DO     • aspirin  81 mg Oral Daily [facility] Aj, DO     • busPIRone  5 mg Oral BID [facility] Aj, DO     • cefTRIAXone  1,000 mg Intravenous Q24H Hari Johnson PA-C Stopped (12/25/22 2050)   • clonazePAM  0 25 mg Oral BID PRN [facility] Aj, DO     • enoxaparin  30 mg Subcutaneous Q12H [address] Katrin Hansen PA-C     • hydrALAZINE  5 mg Intravenous Q6H PRN Darcie Hunt Aj, DO          Today, Patient Was Seen By: Jesse Chan MD    ** Please Note: Dictation voice to text software may have been used in the creation of this document   **

## 2022-12-26 NOTE — ASSESSMENT & PLAN NOTE
· Per family at bedside patient has had multiple falls over the last few weeks  · PT/OT eval recommending rehab  · Case management for discharge planning

## 2022-12-26 NOTE — PLAN OF CARE
Problem: OCCUPATIONAL THERAPY ADULT  Goal: Performs self-care activities at highest level of function for planned discharge setting  See evaluation for individualized goals  Description: Treatment Interventions: ADL retraining, Functional transfer training, Activityengagement, Energy conservation, Cardiac education, Continued evaluation, Compensatory technique education, Endurance training, Patient/family training, Equipment evaluation/education          See flowsheet documentation for full assessment, interventions and recommendations  Note: Limitation: Decreased ADL status, Decreased high-level ADLs, Decreased self-care trans, Decreased UE strength, Decreased endurance, Decreased Safe judgement during ADL, Decreased cognition, Visual deficit  Prognosis: Good  Assessment: Pt is a 80 y o  female seen for OT evaluation s/p admit to [facility] on 12/23/2022 w/ Acute encephalopathy  Comorbidities affecting pt's functional performance at time of assessment include:anxiety, COVID-19, CKD, fall history and arthritis   Orders placed for OT evaluation and treatment  Performed at least two patient identifiers during session including name and wristband  Personal factors affecting pt at time of IE include:steps to enter environment, behavioral pattern, difficulty performing ADLS, difficulty performing IADLS , limited insight into deficits, decreased initiation and engagement , financial barriers, health management  and environment  Prior to admission, pt reports Ind with ADLs, A with IADLs, and (-) driving    Upon evaluation: Pt requires min A with UB ADLs, mod A with LB ADLs, min A with xfers and mod A with functional mobility 2* the following deficits impacting occupational performance: weakness, decreased strength, decreased dynamic sit/ stand balance, decreased activity tolerance, decreased standing tolerance time for self care and functional mobility, impaired memory, impaired sequencing, impaired problem solving, decreased safety awareness, environmental deficits, decreased coping skills, decreased mobilty and requiring external assistance to complete transitional movements  Pt to benefit from continued skilled OT tx while in the hospital to address deficits as defined above and maximize level of functional independence w ADL's and functional mobility  Occupational Performance areas to address include: bathing/shower, toilet hygiene, dressing, medication management, socialization, health maintenance, functional mobility, community mobility, clothing management and household maintenance  From OT standpoint, recommendation at time of d/c would be post acute rehab       OT Discharge Recommendation: Post acute rehabilitation services

## 2022-12-26 NOTE — PLAN OF CARE
Problem: Potential for Falls  Goal: Patient will remain free of falls  Description: INTERVENTIONS:  - Educate patient/family on patient safety including physical limitations  - Instruct patient to call for assistance with activity   - Consult OT/PT to assist with strengthening/mobility   - Keep Call bell within reach  - Keep bed low and locked with side rails adjusted as appropriate  - Keep care items and personal belongings within reach  - Initiate and maintain comfort rounds  - Make Fall Risk Sign visible to staff  - Offer Toileting every 2  Hours, in advance of need  - Initiate/Maintain bed alarm  - Apply yellow socks and bracelet for high fall risk patients  - Consider moving patient to room near nurses station  Outcome: Progressing     Problem: Prexisting or High Potential for Compromised Skin Integrity  Goal: Skin integrity is maintained or improved  Description: INTERVENTIONS:  - Identify patients at risk for skin breakdown  - Assess and monitor skin integrity  - Assess and monitor nutrition and hydration status  - Monitor labs   - Assess for incontinence   - Turn and reposition patient  - Assist with mobility/ambulation  - Relieve pressure over bony prominences  - Avoid friction and shearing  - Provide appropriate hygiene as needed including keeping skin clean and dry  - Evaluate need for skin moisturizer/barrier cream  - Collaborate with interdisciplinary team   - Patient/family teaching  - Consider wound care consult   Outcome: Progressing     Problem: MOBILITY - ADULT  Goal: Maintain or return to baseline ADL function  Description: INTERVENTIONS:  -  Assess patient's ability to carry out ADLs; assess patient's baseline for ADL function and identify physical deficits which impact ability to perform ADLs (bathing, care of mouth/teeth, toileting, grooming, dressing, etc )  - Assess/evaluate cause of self-care deficits   - Assess range of motion  - Assess patient's mobility; develop plan if impaired  - Assess patient's need for assistive devices and provide as appropriate  - Encourage maximum independence but intervene and supervise when necessary  - Involve family in performance of ADLs  - Assess for home care needs following discharge   - Consider OT consult to assist with ADL evaluation and planning for discharge  - Provide patient education as appropriate  Outcome: Progressing

## 2022-12-26 NOTE — ASSESSMENT & PLAN NOTE
· Son is also COVID positive and admitted to the hospital  · Senia + 12/23/2022  · CXR (12/23): No acute cardiopulmonary disease    · As she is not requiring supplemental oxygen, will not begin Remdesivir or Decadron  · Will continue supportive care

## 2022-12-26 NOTE — ED PROVIDER NOTES
History  Chief Complaint   Patient presents with   • Multiple Falls     2 falls today, patient denies head strike and LOC   • Altered Mental Status     Per EMS, family reports AMS     Patient is an 51-year-old female with a history of hypertension, hyperlipidemia, hypothyroidism that presents for evaluation of generalized weakness and multiple falls  Patient presents via EMS and history is limited secondary to some mild confusion  Report per EMS says that the patient lives with her daughter-in-law at this time and her son has been hospitalized in the ICU with COVID  Patient has been having some cough rhinorrhea congestion over the past several days indicating she may have COVID as well  She is also become increasingly generally weak with issues walking around the house  She had 2 falls today that were described as cannot cool without evidence of head trauma or syncope  I confirmed this with the daughter-in-law  Daughter-in-law is concerned because she can no longer take care of her at home in her current state  Patient does not have any complaints for me at this time  GCS 14 nonfocal exam   No external signs of trauma  Prior to Admission Medications   Prescriptions Last Dose Informant Patient Reported? Taking?   aspirin 81 MG tablet Unknown  Yes No   Sig: Take 1 tablet by mouth daily   busPIRone (BUSPAR) 5 mg tablet Unknown  No No   Sig: Take 1 tablet (5 mg total) by mouth 2 (two) times a day   clonazePAM (KlonoPIN) 1 mg tablet Unknown  No No   Si/2 po tid prn anxiety   meloxicam (Mobic) 7 5 mg tablet Unknown  No No   Sig: Take 1 tablet (7 5 mg total) by mouth daily   naloxone (NARCAN) 4 mg/0 1 mL nasal spray Unknown  No No   Sig: Administer 1 spray into a nostril if takes extra Tramadol     traMADol (Ultram) 50 mg tablet Unknown  No No   Sig: Take 1 tablet (50 mg total) by mouth every 8 (eight) hours as needed for moderate pain      Facility-Administered Medications: None       Past Medical History:   Diagnosis Date   • Anxiety    • Arthritis    • Hyperlipidemia    • Hypertension    • Hyperthyroidism        Past Surgical History:   Procedure Laterality Date   • ABDOMINAL SURGERY      peritoneal    • APPENDECTOMY     • HYSTERECTOMY         Family History   Problem Relation Age of Onset   • Heart disease Father    • Pulmonary embolism Father    • Dementia Sister    • Lung cancer Other    • Diabetes Mother      I have reviewed and agree with the history as documented  E-Cigarette/Vaping     E-Cigarette/Vaping Substances     Social History     Tobacco Use   • Smoking status: Former     Years: 15 00     Types: Cigarettes   • Smokeless tobacco: Never   Substance Use Topics   • Alcohol use: No   • Drug use: No       Review of Systems   Constitutional: Negative for fever  HENT: Negative for sore throat  Respiratory: Negative for shortness of breath  Cardiovascular: Negative for chest pain  Gastrointestinal: Negative for abdominal pain  Genitourinary: Negative for dysuria  Musculoskeletal: Negative for back pain  Skin: Negative for rash  Neurological: Negative for light-headedness  Psychiatric/Behavioral: Negative for agitation  All other systems reviewed and are negative  Physical Exam  Physical Exam  Vitals reviewed  Constitutional:       General: She is not in acute distress  Appearance: She is well-developed  HENT:      Head: Normocephalic  Eyes:      Pupils: Pupils are equal, round, and reactive to light  Cardiovascular:      Rate and Rhythm: Normal rate and regular rhythm  Heart sounds: Normal heart sounds  Pulmonary:      Effort: Pulmonary effort is normal       Breath sounds: Normal breath sounds  Abdominal:      General: Bowel sounds are normal  There is no distension  Palpations: Abdomen is soft  Tenderness: There is no abdominal tenderness  There is no guarding  Musculoskeletal:         General: No tenderness or deformity   Normal range of motion  Cervical back: Normal range of motion and neck supple  Skin:     General: Skin is warm and dry  Capillary Refill: Capillary refill takes less than 2 seconds  Neurological:      Mental Status: She is alert  Cranial Nerves: No cranial nerve deficit  Sensory: No sensory deficit  Comments: Alert and oriented x2, GCS 14  Strength 5-5 in all 4 extremity sensation intact throughout   Psychiatric:         Behavior: Behavior normal          Thought Content:  Thought content normal          Judgment: Judgment normal          Vital Signs  ED Triage Vitals   Temperature Pulse Respirations Blood Pressure SpO2   12/23/22 1539 12/23/22 1530 12/23/22 1530 12/23/22 1533 12/23/22 1530   97 6 °F (36 4 °C) 77 17 (!) 215/91 96 %      Temp Source Heart Rate Source Patient Position - Orthostatic VS BP Location FiO2 (%)   12/23/22 1539 12/23/22 1530 12/23/22 1530 12/23/22 2155 --   Oral Monitor Sitting Left arm       Pain Score       12/23/22 1530       No Pain           Vitals:    12/26/22 0400 12/26/22 0524 12/26/22 0600 12/26/22 0700   BP: 154/67 (!) 182/82 133/61 130/63   Pulse: 70 75 74 83   Patient Position - Orthostatic VS:    Lying         Visual Acuity  Visual Acuity    Flowsheet Row Most Recent Value   L Pupil Size (mm) 3   R Pupil Size (mm) 3   L Pupil Shape Round   R Pupil Shape Round          ED Medications  Medications   aspirin (ECOTRIN LOW STRENGTH) EC tablet 81 mg (81 mg Oral Given 12/26/22 0836)   busPIRone (BUSPAR) tablet 5 mg (5 mg Oral Given 12/26/22 0836)   clonazePAM (KlonoPIN) tablet 0 25 mg (has no administration in time range)   amLODIPine (NORVASC) tablet 5 mg (5 mg Oral Given 12/26/22 0836)   hydrALAZINE (APRESOLINE) injection 5 mg (5 mg Intravenous Given 12/26/22 0524)   acetaminophen (TYLENOL) tablet 650 mg (650 mg Oral Given 12/26/22 0456)   cefTRIAXone (ROCEPHIN) IVPB (premix in dextrose) 1,000 mg 50 mL (0 mg Intravenous Stopped 12/25/22 2050)   enoxaparin (LOVENOX) subcutaneous injection 30 mg (30 mg Subcutaneous Given 12/26/22 0836)   potassium chloride (K-DUR,KLOR-CON) CR tablet 40 mEq (40 mEq Oral Given 12/24/22 1125)       Diagnostic Studies  Results Reviewed     Procedure Component Value Units Date/Time    Procalcitonin [[id number]]  (Normal) Collected: 12/24/22 0509    Lab Status: Final result Specimen: Blood from Arm, Right Updated: 12/24/22 0545     Procalcitonin 0 06 ng/ml     Basic metabolic panel [[id number]]  (Abnormal) Collected: 12/24/22 0509    Lab Status: Final result Specimen: Blood from Arm, Right Updated: 12/24/22 0534     Sodium 137 mmol/L      Potassium 3 1 mmol/L      Chloride 103 mmol/L      CO2 24 mmol/L      ANION GAP 10 mmol/L      BUN 15 mg/dL      Creatinine 0 78 mg/dL      Glucose 117 mg/dL      Calcium 9 0 mg/dL      eGFR 67 ml/min/1 73sq m     Narrative:      [place] guidelines for Chronic Kidney Disease (CKD):   •  Stage 1 with normal or high GFR (GFR > 90 mL/min/1 73 square meters)  •  Stage 2 Mild CKD (GFR = 60-89 mL/min/1 73 square meters)  •  Stage 3A Moderate CKD (GFR = 45-59 mL/min/1 73 square meters)  •  Stage 3B Moderate CKD (GFR = 30-44 mL/min/1 73 square meters)  •  Stage 4 Severe CKD (GFR = 15-29 mL/min/1 73 square meters)  •  Stage 5 End Stage CKD (GFR <15 mL/min/1 73 square meters)  Note: GFR calculation is accurate only with a steady state creatinine    Magnesium [[id number]]  (Normal) Collected: 12/24/22 0509    Lab Status: Final result Specimen: Blood from Arm, Right Updated: 12/24/22 0534     Magnesium 1 9 mg/dL     CBC (With Platelets) [[id number]]  (Abnormal) Collected: 12/24/22 0509    Lab Status: Final result Specimen: Blood from Arm, Right Updated: 12/24/22 0518     WBC 8 02 Thousand/uL      RBC 3 53 Million/uL      Hemoglobin 11 1 g/dL      Hematocrit 33 4 %      MCV 95 fL      MCH 31 4 pg      MCHC 33 2 g/dL      RDW 13 2 %      Platelets 181 Thousands/uL      MPV 10 6 fL     T4, free [[id number]] (Normal) Collected: 12/23/22 1553    Lab Status: Final result Specimen: Blood from Arm, Left Updated: 12/23/22 2343     Free T4 1 35 ng/dL     Procalcitonin [[id number]]  (Normal) Collected: 12/23/22 2044    Lab Status: Final result Specimen: Blood from Arm, Right Updated: 12/23/22 2118     Procalcitonin 0 05 ng/ml     C-reactive protein [[id number]]  (Abnormal) Collected: 12/23/22 2044    Lab Status: Final result Specimen: Blood from Arm, Right Updated: 12/23/22 2109     CRP 21 5 mg/L     D-dimer, quantitative [[id number]]  (Abnormal) Collected: 12/23/22 2044    Lab Status: Final result Specimen: Blood from Arm, Right Updated: 12/23/22 2106     D-Dimer, Quant 1 03 ug/ml FEU     Narrative: In the evaluation for possible pulmonary embolism, in the appropriate (Well's Score of 4 or less) patient, the age adjusted d-dimer cutoff for this patient can be calculated as:    Age x 0 01 (in ug/mL) for Age-adjusted D-dimer exclusion threshold for a patient over 50 years      Urine Microscopic [[id number]]  (Abnormal) Collected: 12/23/22 1708    Lab Status: Final result Specimen: Urine, Straight Cath Updated: 12/23/22 1758     RBC, UA 0-1 /hpf      WBC, UA 0-1 /hpf      Epithelial Cells Occasional /hpf      Bacteria, UA None Seen /hpf      AMORPH URATES Occasional /hpf      MUCUS THREADS Moderate    UA w Reflex to Microscopic w Reflex to Culture [[id number]]  (Abnormal) Collected: 12/23/22 1708    Lab Status: Final result Specimen: Urine, Straight Cath Updated: 12/23/22 1714     Color, UA [place]     Clarity, UA Clear     Specific Gravity, UA 1 025     pH, UA 6 0     Leukocytes, UA Negative     Nitrite, UA Negative     Protein, UA 2+ mg/dl      Glucose, UA Negative mg/dl      Ketones, UA 15 (1+) mg/dl      Urobilinogen, UA 0 2 E U /dl      Bilirubin, UA Negative     Occult Blood, UA 2+    TSH, 3rd generation with Free T4 reflex [[id number]]  (Abnormal) Collected: 12/23/22 1553    Lab Status: Final result Specimen: Blood from Arm, Left Updated: 12/23/22 1705     TSH 3RD GENERATON 0 448 uIU/mL     Narrative:      Patients undergoing fluorescein dye angiography may retain small amounts of fluorescein in the body for 48-72 hours post procedure  Samples containing fluorescein can produce falsely depressed TSH values  If the patient had this procedure,a specimen should be resubmitted post fluorescein clearance  FLU/RSV/COVID - if FLU/RSV clinically relevant [[id number]]  (Abnormal) Collected: 12/23/22 1553    Lab Status: Final result Specimen: Nares from Nose Updated: 12/23/22 1644     SARS-CoV-2 Positive     INFLUENZA A PCR Negative     INFLUENZA B PCR Negative     RSV PCR Negative    Narrative:      FOR PEDIATRIC PATIENTS - copy/paste COVID Guidelines URL to browser: [email or web address]  [employer]    SARS-CoV-2 assay is a Nucleic Acid Amplification assay intended for the  qualitative detection of nucleic acid from SARS-CoV-2 in nasopharyngeal  swabs  Results are for the presumptive identification of SARS-CoV-2 RNA  Positive results are indicative of infection with SARS-CoV-2, the virus  causing COVID-19, but do not rule out bacterial infection or co-infection  with other viruses  Laboratories within the [place] and its  territories are required to report all positive results to the appropriate  public health authorities  Negative results do not preclude SARS-CoV-2  infection and should not be used as the sole basis for treatment or other  patient management decisions  Negative results must be combined with  clinical observations, patient history, and epidemiological information  This test has not been FDA cleared or approved  This test has been authorized by FDA under an Emergency Use Authorization  (EUA)   This test is only authorized for the duration of time the  declaration that circumstances exist justifying the authorization of the  emergency use of an in vitro diagnostic tests for detection of SARS-CoV-2  virus and/or diagnosis of COVID-19 infection under section 564(b)(1) of  the Act, 21 U  S C  005CRO-9(L)(6), unless the authorization is terminated  or revoked sooner  The test has been validated but independent review by FDA  and CLIA is pending  Test performed using [employer] GeneXpert: This RT-PCR assay targets N2,  a region unique to SARS-CoV-2  A conserved region in the E-gene was chosen  for pan-Sarbecovirus detection which includes SARS-CoV-2  According to CMS-2020-01-R, this platform meets the definition of high-throughput technology      Comprehensive metabolic panel [[id number]] Collected: 12/23/22 1553    Lab Status: Final result Specimen: Blood from Arm, Left Updated: 12/23/22 1633     Sodium 140 mmol/L      Potassium 3 6 mmol/L      Chloride 102 mmol/L      CO2 26 mmol/L      ANION GAP 12 mmol/L      BUN 15 mg/dL      Creatinine 0 87 mg/dL      Glucose 112 mg/dL      Calcium 10 0 mg/dL      AST 28 U/L      ALT 17 U/L      Alkaline Phosphatase 62 U/L      Total Protein 7 7 g/dL      Albumin 4 5 g/dL      Total Bilirubin 1 00 mg/dL      eGFR 59 ml/min/1 73sq m     Narrative:      [place] guidelines for Chronic Kidney Disease (CKD):   •  Stage 1 with normal or high GFR (GFR > 90 mL/min/1 73 square meters)  •  Stage 2 Mild CKD (GFR = 60-89 mL/min/1 73 square meters)  •  Stage 3A Moderate CKD (GFR = 45-59 mL/min/1 73 square meters)  •  Stage 3B Moderate CKD (GFR = 30-44 mL/min/1 73 square meters)  •  Stage 4 Severe CKD (GFR = 15-29 mL/min/1 73 square meters)  •  Stage 5 End Stage CKD (GFR <15 mL/min/1 73 square meters)  Note: GFR calculation is accurate only with a steady state creatinine    Ammonia [[id number]]  (Normal) Collected: 12/23/22 1557    Lab Status: Final result Specimen: Blood from Arm, Left Updated: 12/23/22 1625     Ammonia 30 umol/L     Carboxyhemoglobin [[id number]]  (Abnormal) Collected: 12/23/22 1553    Lab Status: Final result Specimen: Blood from Arm, Left Updated: 12/23/22 1601     Carbon Monoxide, Blood 2 5 %     Narrative: Therapeutic levels (1 mg/mL and 2 mg/mL) of hydroxocobalamin may interfere with the fCOHb and fMetHb where it may cause lower than expected values  Normal Carboxyhemoglobin range for nonsmokers is <1 5%   Normal Carboxyhemoglobin range for smokers is 1 5% to 5 1%     CBC and differential [[id number]]  (Abnormal) Collected: 12/23/22 1553    Lab Status: Final result Specimen: Blood from Arm, Left Updated: 12/23/22 1557     WBC 15 29 Thousand/uL      RBC 4 14 Million/uL      Hemoglobin 13 0 g/dL      Hematocrit 39 6 %      MCV 96 fL      MCH 31 4 pg      MCHC 32 8 g/dL      RDW 12 9 %      MPV 11 0 fL      Platelets 549 Thousands/uL      nRBC 0 /100 WBCs      Neutrophils Relative 86 %      Immat GRANS % 0 %      Lymphocytes Relative 7 %      Monocytes Relative 7 %      Eosinophils Relative 0 %      Basophils Relative 0 %      Neutrophils Absolute 13 11 Thousands/µL      Immature Grans Absolute 0 06 Thousand/uL      Lymphocytes Absolute 1 05 Thousands/µL      Monocytes Absolute 0 99 Thousand/µL      Eosinophils Absolute 0 03 Thousand/µL      Basophils Absolute 0 05 Thousands/µL                  CT head wo contrast   Final Result by Sterling Petersen MD (12/23 1731)      No intracranial hemorrhage or calvarial fracture  2 2 x 1 8 cm extra-axial soft tissue mass with calcifications in the right temporal region abutting the calvarium  Based on appearance this likely represents a meningioma  No significant mass effect or midline shift  Mild chronic microvascular ischemic changes  The study was marked in [facility] for immediate notification  Workstation performed: [id number]         XR chest portable   ED Interpretation by Marvel Ge MD (12/23 1700)   ED wet read: No acute cardiopulmonary process noted      Final Result by Sharron Zapien DO (12/23 3372)      No acute cardiopulmonary disease  Workstation performed: [id number]                    Procedures  Procedures         ED Course                               SBIRT 20yo+    Flowsheet Row Most Recent Value   SBIRT (23 yo +)    In order to provide better care to our patients, we are screening all of our patients for alcohol and drug use  Would it be okay to ask you these screening questions? Yes Filed at: 12/23/2022 1536   Initial Alcohol Screen: US AUDIT-C     1  How often do you have a drink containing alcohol? 0 Filed at: 12/23/2022 1536   2  How many drinks containing alcohol do you have on a typical day you are drinking? 0 Filed at: 12/23/2022 1536   3a  Male UNDER 65: How often do you have five or more drinks on one occasion? 0 Filed at: 12/23/2022 1536   3b  FEMALE Any Age, or MALE 65+: How often do you have 4 or more drinks on one occassion? 0 Filed at: 12/23/2022 1536   Audit-C Score 0 Filed at: 12/23/2022 1536   NINA: How many times in the past year have you    Used an illegal drug or used a prescription medication for non-medical reasons? Never Filed at: 12/23/2022 1536                    MDM  Number of Diagnoses or Management Options  Acute encephalopathy  COVID-19  Diagnosis management comments: Patient is an 70-year-old female presents for evaluation of acute cephalopathy in setting of COVID-19  This is likely the source of her symptoms  Secondary to the falls, CT head was obtained and negative for any acute abnormality  Otherwise work-up is unremarkable  Patient can no longer care for herself at home and will need to be admitted for further work-up and management and placement        Disposition  Final diagnoses:   Acute encephalopathy   COVID-19     Time reflects when diagnosis was documented in both MDM as applicable and the Disposition within this note     Time User Action Codes Description Comment    12/23/2022  6:14 PM Martina Huber Add [G93 40] Acute encephalopathy     12/23/2022  6:14 PM Martina Huber Add [U07 1] COVID-19       ED Disposition     ED Disposition   Admit    Condition   Stable    Date/Time   Fri Dec 23, 2022  6:14 PM    Comment   Case was discussed with TATI and the patient's admission status was agreed to be Admission Status: inpatient status to the service of Dr Idris Lugo   Follow-up Information    None         Current Discharge Medication List      CONTINUE these medications which have NOT CHANGED    Details   aspirin 81 MG tablet Take 1 tablet by mouth daily      busPIRone (BUSPAR) 5 mg tablet Take 1 tablet (5 mg total) by mouth 2 (two) times a day  Qty: 60 tablet, Refills: 5    Comments: Using in place of Klonopin-will have Klonopin be only as needed  Associated Diagnoses: Anxiety      clonazePAM (KlonoPIN) 1 mg tablet 1/2 po tid prn anxiety  Qty: 45 tablet, Refills: 1    Associated Diagnoses: Anxiety      meloxicam (Mobic) 7 5 mg tablet Take 1 tablet (7 5 mg total) by mouth daily  Qty: 30 tablet, Refills: 3    Associated Diagnoses: Arthritis      naloxone (NARCAN) 4 mg/0 1 mL nasal spray Administer 1 spray into a nostril if takes extra Tramadol  Qty: 1 each, Refills: 1    Associated Diagnoses: Anxiety; Arthritis      traMADol (Ultram) 50 mg tablet Take 1 tablet (50 mg total) by mouth every 8 (eight) hours as needed for moderate pain  Qty: 90 tablet, Refills: 0    Associated Diagnoses: Arthritis             No discharge procedures on file      PDMP Review       Value Time User    PDMP Reviewed  Yes 2022 12:31 PM Susana Roque MD          ED Provider  Electronically Signed by           Neena Khalil MD  22 [id number]

## 2022-12-26 NOTE — OCCUPATIONAL THERAPY NOTE
Occupational Therapy Evaluation      Dilip Lizama    12/26/2022    Principal Problem:    Acute encephalopathy  Active Problems:    Anxiety    Hyperthyroidism    Meningioma ([facility] [place] [address] )    Stage 3b chronic kidney disease ([facility] [place] [address] )    COVID-19    Hypertensive urgency    Frequent falls      Past Medical History:   Diagnosis Date    Anxiety     Arthritis     Hyperlipidemia     Hypertension     Hyperthyroidism        Past Surgical History:   Procedure Laterality Date    ABDOMINAL SURGERY      peritoneal     APPENDECTOMY      HYSTERECTOMY         12/26/22 1015   OT Last Visit   OT Visit Date 12/26/22   Note Type   Note type Evaluation   Pain Assessment   Pain Assessment Tool 0-10   Pain Score No Pain   Restrictions/Precautions   Weight Bearing Precautions Per Order No   Braces or Orthoses   (none reported)   Other Precautions Visual impairment;Contact/isolation; Airborne/isolation;Droplet precautions; Fall Risk; Chair Alarm;Hard of hearing   Home Living   Type of [place] to enter with rails; Performs ADLs on one level; Able to live on main level with bedroom/bathroom; Two level  (4 GARRY; pt reports she doesn't use basement or 2nd floor)   Bathroom Shower/Tub Tub/shower unit  (sponge bath at baseline)   [address] [place] [address] Dr guzman   [address]; Wheelchair-manual  (uses RW in home and w/c for long distances)   Prior Function   Level of [place] Independent with functional mobility; Independent with ADLs; Needs assistance with IADLS   Lives With Son  (son's significant other)   Receives Help From Family   IADLs Family/Friend/Other provides transportation; Family/Friend/Other provides meals; Family/Friend/Other provides medication management  (family does cleaning and laundry)   Falls in the last 6 months 1 to 4  (3-4)   Vocational Retired  (factory/ mill)   Lifestyle   Autonomy Pt reports PLOF was Ind with ADLS, A with IADLS and (-)  Reciprocal Relationships 1 son and 1 dtr   ADL   Eating Assistance 5  Supervision/Setup   Eating Deficit Increased time to complete;Setup;Supervision/safety   Grooming Assistance 5  Supervision/Setup   Grooming Deficit Setup;Supervision/safety; Increased time to complete   UB Bathing Assistance 4  Minimal Assistance   UB Bathing Deficit Increased time to complete;Supervision/safety;Verbal cueing;Steadying;Setup   LB Bathing Assistance 3  Moderate Assistance   LB Bathing Deficit Increased time to complete;Supervision/safety;Verbal cueing;Steadying;Setup   UB Dressing Assistance 4  Minimal Assistance   UB Dressing Deficit Increased time to complete;Supervision/safety;Verbal cueing;Steadying;Setup   LB Dressing Assistance 3  Moderate Assistance   LB Dressing Deficit Supervision/safety; Increased time to complete;Verbal cueing;Setup;Steadying   Toileting Assistance  3  Moderate Assistance   Toileting Deficit Increased time to complete;Supervison/safety;Verbal cueing;Steadying;Setup   Functional Assistance 3  Moderate Assistance   Functional Deficit Increased time to complete;Supervision/safety;Verbal cueing;Steadying;Setup   Bed Mobility   Additional Comments Pt was oob to commode when OT arrived   Transfers   Sit to Stand 4  Minimal assistance   Additional items Assist x 1; Increased time required;Verbal cues;Armrests   Stand to Sit 4  Minimal assistance   Additional items Assist x 1; Armrests; Increased time required;Verbal cues   Toilet transfer 4  Minimal assistance   Additional items Assist x 1; Increased time required;Verbal cues; Commode   Functional Mobility   Functional Mobility 3  Moderate assistance   Additional items Rolling walker   Balance   Static Sitting Fair   Dynamic Sitting Fair -   Static Standing Poor +   Dynamic Standing Poor   Activity Tolerance   Activity Tolerance Patient limited by fatigue  (and dizziness)   RUE Assessment   RUE Assessment X  (ROM functional assessment was [facility]; MMT was +3/5)   MO Assessment   LUE Assessment X  (ROM functional assessment was [facility]; MMT was +3/5)   Hand Function   Gross Motor Coordination Functional   Fine Motor Coordination Functional   Sensation   Light Touch No apparent deficits   Sharp/Dull No apparent deficits   Stereognosis No apparent deficits   Proprioception   Proprioception No apparent deficits   Vision-Basic Assessment   Current Vision   (decreased vision)   Vision - Complex Assessment   Ocular Range of Motion Intact   Psychosocial   Psychosocial (WDL) WDL   Perception   Inattention/Neglect Appears intact   Cognition   Overall Cognitive Status Impaired   Arousal/Participation Alert; Cooperative   Attention Attends with cues to redirect   Orientation Level Oriented X4   Memory Decreased recall of recent events;Decreased short term memory   Following Commands Follows one step commands with increased time or repetition   Comments Pt was agreeable to OT eval   Assessment   Limitation Decreased ADL status; Decreased high-level ADLs; Decreased self-care trans;Decreased UE strength;Decreased endurance;Decreased Safe judgement during ADL;Decreased cognition;Visual deficit   Prognosis Good   Assessment Pt is a 80 y o  female seen for OT evaluation s/p admit to [facility] on 12/23/2022 w/ Acute encephalopathy  Comorbidities affecting pt's functional performance at time of assessment include:anxiety, COVID-19, CKD, fall history and arthritis   Orders placed for OT evaluation and treatment  Performed at least two patient identifiers during session including name and wristband  Personal factors affecting pt at time of IE include:steps to enter environment, behavioral pattern, difficulty performing ADLS, difficulty performing IADLS , limited insight into deficits, decreased initiation and engagement , financial barriers, health management  and environment  Prior to admission, pt reports Ind with ADLs, A with IADLs, and (-) driving    Upon evaluation: Pt requires min A with UB ADLs, mod A with LB ADLs, min A with xfers and mod A with functional mobility 2* the following deficits impacting occupational performance: weakness, decreased strength, decreased dynamic sit/ stand balance, decreased activity tolerance, decreased standing tolerance time for self care and functional mobility, impaired memory, impaired sequencing, impaired problem solving, decreased safety awareness, environmental deficits, decreased coping skills, decreased mobilty and requiring external assistance to complete transitional movements  Pt to benefit from continued skilled OT tx while in the hospital to address deficits as defined above and maximize level of functional independence w ADL's and functional mobility  Occupational Performance areas to address include: bathing/shower, toilet hygiene, dressing, medication management, socialization, health maintenance, functional mobility, community mobility, clothing management and household maintenance  From OT standpoint, recommendation at time of d/c would be post acute rehab  Plan   Treatment Interventions ADL retraining;Functional transfer training; Activityengagement; Energy conservation;Cardiac education;Continued evaluation; Compensatory technique education; Endurance training;Patient/family training;Equipment evaluation/education   Goal Expiration Date 01/08/23   OT Frequency 3-5x/wk   Recommendation   OT Discharge Recommendation Post acute rehabilitation services   AM-PAC Daily Activity Inpatient   Lower Body Dressing 2   Bathing 2   Toileting 2   Upper Body Dressing 3   Grooming 3   Eating 3   Daily Activity Raw Score 15   Daily Activity Standardized Score (Calc for Raw Score >=11) 34 69   AM-PAC Applied Cognition Inpatient   Following a Speech/Presentation 1   Understanding Ordinary Conversation 3   Taking Medications 2   Remembering Where Things Are Placed or Put Away 2   Remembering List of 4-5 Errands 2   Taking Care of Complicated Tasks 1   Applied Cognition Raw Score 11   Applied Cognition Standardized Score 27 03   Barthel Index   Feeding 5   Bathing 0   Grooming Score 0   Dressing Score 5   Bladder Score 10   Bowels Score 10   Toilet Use Score 5   Transfers (Bed/Chair) Score 10   Mobility (Level Surface) Score 0   Stairs Score 0   Barthel Index Score 45   Modified Marathon Scale   Modified Marathon Scale 4     Occupational Therapy goals: In 7-14 days:    Patient will verbalize and demonstrate use of energy conservation/ deep breathing technique and work simplification skills during functional activity with no verbal cues  Patient will verbalize and demonstrate good body mechanics and joint protection techniques during  ADLs/ IADLs with no verbal cues  Patient will increase OOB/ sitting tolerance to 2-4 hours per day for increased participation in self care and leisure tasks with no s/s of exertion  Patient will increase standing tolerance time to 5  minutes with unilateral UE support to complete sink level ADLs@ mod I level  Patient will increase sitting tolerance at edge of bed to 20 minutes to complete UB ADLs @ set up assist level  Patient will transfer bed to Chair / toilet at Set up assist level with AD as indicated  Patient will complete UB ADLs with set up assist     Patient will complete LB ADLs with min assist with the use of adaptive equipment  Patient will complete toileting hygiene with set up assist/ supervision for thoroughness      Patient/ Family  will demonstrate competency with UE Home Exercise Program        Víctor Suarez MS OTR/L

## 2022-12-26 NOTE — PROGRESS NOTES
1915: Pt bladder scanned for 221  Pt bathed, linens changed & repositioned for comfort  0430: Pt slept on/off throughout the night  Pt c/o 7/10 headache  Pt medicated with PRN tylenol  Pt has not yet voided this shift  Bladder scanned for 344  Pt straight catheterized for approx 450 ml clear doug urine  Pt tolerated procedure well  BP elevated >180  Flushed L arm heplock to check for patency; infiltrated  Pt given 5 mg iv hydralazine via RAC heplock  L arm heplock removed  Pt repositioned for comfort  Lights turned off per request  Call bell & felicitaies within reach  Bed check on for safety precautions

## 2022-12-27 RX ADMIN — AMLODIPINE BESYLATE 5 MG: 5 TABLET ORAL at 09:28

## 2022-12-27 RX ADMIN — ENOXAPARIN SODIUM 30 MG: 100 INJECTION SUBCUTANEOUS at 20:58

## 2022-12-27 RX ADMIN — BUSPIRONE HYDROCHLORIDE 5 MG: 5 TABLET ORAL at 17:30

## 2022-12-27 RX ADMIN — ASPIRIN 81 MG: 81 TABLET, COATED ORAL at 09:28

## 2022-12-27 RX ADMIN — ENOXAPARIN SODIUM 30 MG: 100 INJECTION SUBCUTANEOUS at 09:27

## 2022-12-27 RX ADMIN — BUSPIRONE HYDROCHLORIDE 5 MG: 5 TABLET ORAL at 09:28

## 2022-12-27 NOTE — PLAN OF CARE
Problem: Potential for Falls  Goal: Patient will remain free of falls  Description: INTERVENTIONS:  - Educate patient/family on patient safety including physical limitations  - Instruct patient to call for assistance with activity   - Consult OT/PT to assist with strengthening/mobility   - Keep Call bell within reach  - Keep bed low and locked with side rails adjusted as appropriate  - Keep care items and personal belongings within reach  - Initiate and maintain comfort rounds  - Make Fall Risk Sign visible to staff  - Apply yellow socks and bracelet for high fall risk patients  - Consider moving patient to room near nurses station  Outcome: Progressing     Problem: Prexisting or High Potential for Compromised Skin Integrity  Goal: Skin integrity is maintained or improved  Description: INTERVENTIONS:  - Identify patients at risk for skin breakdown  - Assess and monitor skin integrity  - Assess and monitor nutrition and hydration status  - Monitor labs   - Assess for incontinence   - Turn and reposition patient  - Assist with mobility/ambulation  - Relieve pressure over bony prominences  - Avoid friction and shearing  - Provide appropriate hygiene as needed including keeping skin clean and dry  - Evaluate need for skin moisturizer/barrier cream  - Collaborate with interdisciplinary team   - Patient/family teaching  - Consider wound care consult   Outcome: Progressing     Problem: MOBILITY - ADULT  Goal: Maintain or return to baseline ADL function  Description: INTERVENTIONS:  -  Assess patient's ability to carry out ADLs; assess patient's baseline for ADL function and identify physical deficits which impact ability to perform ADLs (bathing, care of mouth/teeth, toileting, grooming, dressing, etc )  - Assess/evaluate cause of self-care deficits   - Assess range of motion  - Assess patient's mobility; develop plan if impaired  - Assess patient's need for assistive devices and provide as appropriate  - Encourage maximum independence but intervene and supervise when necessary  - Involve family in performance of ADLs  - Assess for home care needs following discharge   - Consider OT consult to assist with ADL evaluation and planning for discharge  - Provide patient education as appropriate  Outcome: Progressing  Goal: Maintains/Returns to pre admission functional level  Description: INTERVENTIONS:  - Perform BMAT or MOVE assessment daily    - Set and communicate daily mobility goal to care team and patient/family/caregiver     - Collaborate with rehabilitation services on mobility goals if consulted  - Out of bed for toileting  - Record patient progress and toleration of activity level   Outcome: Progressing     Problem: GENITOURINARY - ADULT  Goal: Maintains or returns to baseline urinary function  Description: INTERVENTIONS:  - Assess urinary function  - Encourage oral fluids to ensure adequate hydration if ordered  - Administer IV fluids as ordered to ensure adequate hydration  - Administer ordered medications as needed  - Offer frequent toileting  - Follow urinary retention protocol if ordered  Outcome: Progressing  Goal: Absence of urinary retention  Description: INTERVENTIONS:  - Assess patient’s ability to void and empty bladder  - Monitor I/O  - Bladder scan as needed  - Discuss with physician/AP medications to alleviate retention as needed  - Discuss catheterization for long term situations as appropriate  Outcome: Progressing

## 2022-12-27 NOTE — PLAN OF CARE
Problem: OCCUPATIONAL THERAPY ADULT  Goal: Performs self-care activities at highest level of function for planned discharge setting  See evaluation for individualized goals  Description: Treatment Interventions: ADL retraining, Functional transfer training, Activityengagement, Energy conservation, Cardiac education, Continued evaluation, Compensatory technique education, Endurance training, Patient/family training, Equipment evaluation/education          See flowsheet documentation for full assessment, interventions and recommendations  Outcome: Progressing  Note: Limitation: Decreased ADL status, Decreased high-level ADLs, Decreased self-care trans, Decreased UE strength, Decreased endurance, Decreased Safe judgement during ADL, Decreased cognition, Visual deficit  Prognosis: Good  Assessment: Patient participated in Skilled OT session this date with interventions consisting of functional transfer training, ADL re training with the use of correct body mechnaics, Energy Conservation techniques and increase dynamic sit/ stand balance during functional activity    Patient agreeable to OT treatment session, upon arrival patient was found supine in bed, alert, responsive  and in no apparent distress  Patient transfers supine to sit EOB with Min A x 2 and VCs for bedrail use  Patient then sits with SBA occasionally for sitting balance EOB  Patient then transfers sit to stand and ambulates a short distance from bed to recliner with Mod A x 2 and VCs for hand placement and safety  Once seated patient then performs oral care/grooming tasks with set up and Min A d/t visual deficits  Patient had no c/o of pain  Session ended with patient seated OOB to recliner ,all needs met, and call bell within reach  In comparison to previous session, patient with improvements in self care ADL performance and endurance   Patient requiring frequent re direction, verbal cues for safety, verbal cues for correct technique, verbal cues for pacing thru activity steps and frequent rest periods  Patient performance  demonstrated good carryover of learned techniques and strategies to facilitate safety during functional tasks  Patient continues to be functioning below baseline level, occupational performance remains limited secondary to factors listed above and increased risk for falls and injury  From OT standpoint, recommendation at time of d/c would be Short Term Rehab  Patient to benefit from continued Occupational Therapy treatment while in the hospital to address deficits as defined above and maximize level of functional independence with ADLs and functional mobility in order to return to OF       OT Discharge Recommendation: Post acute rehabilitation services

## 2022-12-27 NOTE — PHYSICAL THERAPY NOTE
PHYSICAL THERAPY TREATMENT NOTE  NAME:  Alona Hubbard  DATE: 12/27/22    Length Of Stay: 4  Performed at least 2 patient identifiers during session: Name and ID bracelet    TREATMENT:      12/27/22 1114   PT Last Visit   PT Visit Date 12/27/22   Note Type   Note Type Treatment   Pain Assessment   Pain Assessment Tool 0-10   Pain Score No Pain   Restrictions/Precautions   Weight Bearing Precautions Per Order No   Other Precautions Visual impairment; Airborne/isolation;Contact/isolation; Chair Alarm; Bed Alarm; Fall Risk;Multiple lines   General   Chart Reviewed Yes   Response to Previous Treatment Patient with no complaints from previous session  Family/Caregiver Present No   Cognition   Overall Cognitive Status Impaired   Arousal/Participation Alert; Responsive; Cooperative   Attention Attends with cues to redirect   Orientation Level Oriented to person;Oriented to place;Oriented to situation   Memory Decreased recall of precautions;Decreased recall of recent events   Following Commands Follows one step commands without difficulty   Comments pt agreeable to PT session   Bed Mobility   Supine to Sit 4  Minimal assistance   Additional items Assist x 2;HOB elevated; Bedrails; Increased time required;Verbal cues;LE management   Additional Comments pt seated OOB in recliner upon conclusion   Transfers   Sit to Stand 3  Moderate assistance   Additional items Assist x 2;Bedrails; Increased time required;Verbal cues   Stand to Sit 3  Moderate assistance   Additional items Assist x 2;Armrests; Increased time required;Verbal cues   Stand pivot 3  Moderate assistance   Additional items Assist x 2; Increased time required;Verbal cues   Additional Comments requires verbal cues for proper hand placement and safe technique   Ambulation/Elevation   Gait pattern Improper Weight shift; Antalgic;Narrow FUENTES; Decreased foot clearance;Scissoring; Short stride; Step to;Excessively slow   Gait Assistance 3  Moderate assist   Additional items Assist x 2;Verbal cues; Tactile cues   Assistive Device Rolling walker   Distance 3 ft to chair   Ambulation/Elevation Additional Comments verbal cues for direction   Balance   Static Sitting Fair   Dynamic Sitting Fair -   Static Standing Poor +   Dynamic Standing Poor   Ambulatory Poor   Endurance Deficit   Endurance Deficit Yes   Endurance Deficit Description denies dizziness with transitional movements   Activity Tolerance   Activity Tolerance Patient limited by fatigue   Assessment   Prognosis Fair   Problem List Decreased strength;Decreased endurance; Impaired balance;Decreased mobility; Impaired judgement;Decreased safety awareness; Impaired vision   Assessment Pt seen for PT treatment session this date with interventions consisting of gait training w/ emphasis on improving pt's ability to ambulate level surfaces x 3 ft x1 with mod A of 2 provided by therapist with RW and therapeutic activity consisting of training: bed mobility, supine<>sit transfers, sit<>stand transfers, static sitting tolerance at EOB for 15 minutes w/ B UE support, vc and tactile cues for static sitting posture faciliation, vc and tactile cues for static standing posture faciliation and stand pivot transfers towards R direction  Pt agreeable to PT treatment session upon arrival, pt found supine in bed w/ HOB elevated, in no apparent distress and responsive  In comparison to previous session, pt with improvements in functional activity tolerance  Post session: pt returned back to recliner, chair alarm engaged, all needs in reach and RN notified of session findings/recommendations  Continue to recommend post acute rehabilitation services at time of d/c in order to maximize pt's functional independence and safety w/ mobility  Pt continues to be functioning below baseline level, and remains limited 2* factors listed above and including patient requiring Ax2 for functional mobility   PT will continue to see pt during current hospitalization in order to address the deficits listed above and provide interventions consistent w/ POC in effort to achieve STGs  Barriers to Discharge Inaccessible home environment   Goals   Patient Goals to go home   STG Expiration Date 01/04/23   Plan   Treatment/Interventions Functional transfer training;LE strengthening/ROM; Therapeutic exercise; Endurance training;Bed mobility;Gait training;Spoke to nursing   Progress Slow progress, decreased activity tolerance   PT Frequency 3-5x/wk   Recommendation   PT Discharge Recommendation Post acute rehabilitation services   AM-PAC Basic Mobility Inpatient   Turning in Bed Without Bedrails 2   Lying on Back to Sitting on Edge of Flat Bed 2   Moving Bed to Chair 2   Standing Up From Chair 2   Walk in Room 1   Climb 3-5 Stairs 1   Basic Mobility Inpatient Raw Score 10   Highest Level Of Mobility   -HLM Goal 4: Move to chair/commode   JH-HLM Achieved 4: Move to chair/commode   Education   Education Provided Mobility training;Assistive device   Patient Demonstrates acceptance/verbal understanding   End of Consult   Patient Position at End of Consult Bed/Chair alarm activated; Bedside chair; All needs within reach       The patient's AM-PAC Basic Mobility Inpatient Short Form Raw Score is 10  A Raw score of less than or equal to 16 suggests the patient may benefit from discharge to post-acute rehabilitation services  Please also refer to the recommendation of the Physical Therapist for safe discharge planning        Jeremy Ahr, PTA,PTA

## 2022-12-27 NOTE — OCCUPATIONAL THERAPY NOTE
12/27/22 1020   OT Last Visit   OT Visit Date 12/27/22   Note Type   Note Type Treatment   Pain Assessment   Pain Assessment Tool 0-10   Pain Score No Pain   Restrictions/Precautions   Weight Bearing Precautions Per Order No   Other Precautions Visual impairment;Contact/isolation; Airborne/isolation;Droplet precautions; Fall Risk; Chair Alarm;Hard of hearing   ADL   Where Assessed Chair   Grooming Assistance 4  Minimal Assistance   Grooming Deficit Setup;Verbal cueing;Supervision/safety; Increased time to complete   Grooming Comments Assist to apply toothpaste to toothbrush; quintana hair with set up   [address]  Supervision/Setup   UB Bathing Deficit Setup;Verbal cueing;Supervision/safety; Increased time to complete   LB Bathing Assistance 3  Moderate Assistance   LB Bathing Deficit Setup;Verbal cueing;Supervision/safety; Increased time to complete; Buttocks;Right lower leg including foot; Left lower leg including foot   UB Dressing Assistance 4  Minimal Assistance   UB Dressing Deficit Setup;Verbal cueing;Supervision/safety; Increased time to complete; Thread RUE; Thread LUE; Fasteners   LB Dressing Deficit Don/doff R sock; Don/doff L sock   LB Dressing Comments Dep  doff/don socks   Bed Mobility   Supine to Sit 4  Minimal assistance   Additional items Assist x 2;HOB elevated; Bedrails; Increased time required;Verbal cues   Additional Comments Pt sat with occassional SBA to maintain sitting balance at EOB x 5 mins  Transfers   Sit to Stand 3  Moderate assistance   Additional items Assist x 2;Bedrails; Increased time required;Verbal cues   Stand to Sit 3  Moderate assistance   Additional items Assist x 2;Armrests; Increased time required;Verbal cues   Stand pivot 3  Moderate assistance   Additional items Assist x 2; Increased time required;Verbal cues   Functional Mobility   Functional Mobility 3  Moderate assistance   Additional Comments Pt ambulates short distance from bed to recliner with Mod A x 2     Additional items Rolling walker   Cognition   Overall Cognitive Status Impaired   Arousal/Participation Alert; Responsive; Cooperative   Attention Attends with cues to redirect   Orientation Level Oriented to person;Oriented to place;Oriented to situation   Memory Decreased recall of recent events;Decreased short term memory   Following Commands Follows one step commands without difficulty   Comments Pt agreeable to OT treatment  Activity Tolerance   Activity Tolerance Patient tolerated treatment well   Assessment   Assessment Patient participated in Skilled OT session this date with interventions consisting of functional transfer training, ADL re training with the use of correct body mechnaics, Energy Conservation techniques and increase dynamic sit/ stand balance during functional activity    Patient agreeable to OT treatment session, upon arrival patient was found supine in bed, alert, responsive  and in no apparent distress  Patient transfers supine to sit EOB with Min A x 2 and VCs for bedrail use  Patient then sits with SBA occasionally for sitting balance EOB  Patient then transfers sit to stand and ambulates a short distance from bed to recliner with Mod A x 2 and VCs for hand placement and safety  Once seated patient then performs oral care/grooming tasks with set up and Min A d/t visual deficits  Patient had no c/o of pain  Session ended with patient seated OOB to recliner ,all needs met, and call bell within reach  In comparison to previous session, patient with improvements in self care ADL performance and endurance  Patient requiring frequent re direction, verbal cues for safety, verbal cues for correct technique, verbal cues for pacing thru activity steps and frequent rest periods  Patient performance  demonstrated good carryover of learned techniques and strategies to facilitate safety during functional tasks   Patient continues to be functioning below baseline level, occupational performance remains limited secondary to factors listed above and increased risk for falls and injury  From OT standpoint, recommendation at time of d/c would be Short Term Rehab  Patient to benefit from continued Occupational Therapy treatment while in the hospital to address deficits as defined above and maximize level of functional independence with ADLs and functional mobility in order to return to OF     Plan   Treatment Interventions ADL retraining;Functional transfer training;Energy conservation   Goal Expiration Date 01/08/23   OT Treatment Day 1   OT Frequency 3-5x/wk   Recommendation   OT Discharge Recommendation Post acute rehabilitation services   AM-PAC Daily Activity Inpatient   Lower Body Dressing 2   Bathing 2   Toileting 2   Upper Body Dressing 3   Grooming 3   Eating 3   Daily Activity Raw Score 15   Daily Activity Standardized Score (Calc for Raw Score >=11) 34 69   AM-PAC Applied Cognition Inpatient   Following a Speech/Presentation 1   Understanding Ordinary Conversation 3   Taking Medications 2   Remembering Where Things Are Placed or Put Away 2   Remembering List of 4-5 Errands 2   Taking Care of Complicated Tasks 1   Applied Cognition Raw Score 11   Applied Cognition Standardized Score 27 03     Maryanne Ferraro

## 2022-12-27 NOTE — PLAN OF CARE
Problem: PHYSICAL THERAPY ADULT  Goal: Performs mobility at highest level of function for planned discharge setting  See evaluation for individualized goals  Description: Treatment/Interventions: Functional transfer training, LE strengthening/ROM, Therapeutic exercise, Endurance training, Bed mobility, Gait training, Equipment eval/education          See flowsheet documentation for full assessment, interventions and recommendations  Outcome: Progressing  Note: Prognosis: Fair  Problem List: Decreased strength, Decreased endurance, Impaired balance, Decreased mobility, Impaired judgement, Decreased safety awareness, Impaired vision  Assessment: Pt seen for PT treatment session this date with interventions consisting of gait training w/ emphasis on improving pt's ability to ambulate level surfaces x 3 ft x1 with mod A of 2 provided by therapist with RW and therapeutic activity consisting of training: bed mobility, supine<>sit transfers, sit<>stand transfers, static sitting tolerance at EOB for 15 minutes w/ B UE support, vc and tactile cues for static sitting posture faciliation, vc and tactile cues for static standing posture faciliation and stand pivot transfers towards R direction  Pt agreeable to PT treatment session upon arrival, pt found supine in bed w/ HOB elevated, in no apparent distress and responsive  In comparison to previous session, pt with improvements in functional activity tolerance  Post session: pt returned back to recliner, chair alarm engaged, all needs in reach and RN notified of session findings/recommendations  Continue to recommend post acute rehabilitation services at time of d/c in order to maximize pt's functional independence and safety w/ mobility  Pt continues to be functioning below baseline level, and remains limited 2* factors listed above and including patient requiring Ax2 for functional mobility   PT will continue to see pt during current hospitalization in order to address the deficits listed above and provide interventions consistent w/ POC in effort to achieve STGs  Barriers to Discharge: Inaccessible home environment     PT Discharge Recommendation: Post acute rehabilitation services    See flowsheet documentation for full assessment

## 2022-12-27 NOTE — UTILIZATION REVIEW
Initial Clinical Review    Admission: Date/Time/Statement:   Admission Orders (From admission, onward)     Ordered        12/23/22 1815  INPATIENT ADMISSION  Once                      Orders Placed This Encounter   Procedures   • INPATIENT ADMISSION     Standing Status:   Standing     Number of Occurrences:   1     Order Specific Question:   Level of Care     Answer:   Med Surg [16]     Order Specific Question:   Estimated length of stay     Answer:   More than 2 Midnights     Order Specific Question:   Certification     Answer:   I certify that inpatient services are medically necessary for this patient for a duration of greater than two midnights  See H&P and MD Progress Notes for additional information about the patient's course of treatment  ED Arrival Information     Expected   -    Arrival   12/23/2022 15:28    Acuity   Urgent            Means of arrival   Stretcher    Escorted by   [facility]    Admission type   Emergency            Arrival complaint   Altered mental status           Chief Complaint   Patient presents with   • Multiple Falls     2 falls today, patient denies head strike and LOC   • Altered Mental Status     Per EMS, family reports AMS       Initial Presentation: 80 y o  female presents to ED via  EMS  From home  With increased confusion and falls over the past few days  EMS called earlier in the day, patient refused transfused  Found later in the day down again, family called EMS  Son  Currently  Admitted with COVID  Had a fall last week, was not able to get OOB the day before admission,  Complains of weakness  PMH  Is  Anxiety, CKD  Stage 3  Now  With + Covid in ED  CXR  NAD>   Ct brain unremarkable  BP   215/91, improved to  161/74  Admit  Ip with  Acute Encephalopathy, Hypertensive urgency, COVID  19 and plan is  Monitor labs and vital signs, no indication for remdesivir or decadron, supportive care and continue home meds         Date:    12/24       Day 2:   Mental status slightly improved  Needs PT/OT  Continue current meds  Encephalopathy  Likely  Secondary to meds  Vs  Acute viral  Illness  ED Triage Vitals   Temperature Pulse Respirations Blood Pressure SpO2   12/23/22 1539 12/23/22 1530 12/23/22 1530 12/23/22 1533 12/23/22 1530   97 6 °F (36 4 °C) 77 17 (!) 215/91 96 %      Temp Source Heart Rate Source Patient Position - Orthostatic VS BP Location FiO2 (%)   12/23/22 1539 12/23/22 1530 12/23/22 1530 12/23/22 2155 --   Oral Monitor Sitting Left arm       Pain Score       12/23/22 1530       No Pain          Wt Readings from Last 1 Encounters:   12/23/22 47 9 kg (105 lb 9 6 oz)     Additional Vital Signs:   98 1 °F (36 7 °C) 78 -- 143/68 97 95 % -- --    12/24/22 0400 97 6 °F (36 4 °C) 85 18 111/59 79 96 % -- Lying   12/24/22 0300 -- 80 -- 113/55 79 96 % -- --   12/24/22 0200 -- 81 -- 123/60 87 95 % -- --   12/24/22 0100 -- 84 -- 148/66 95 96 % -- --   12/24/22 0000 97 5 °F (36 4 °C) 86 18 137/61 88 96 % None (Room air) Lying   12/23/22 2300 -- 81 -- 154/75 105 96 % -- --   12/23/22 2210 -- 72 -- 181/77 Abnormal  110 97 % -- --   12/23/22 2200 -- 78 -- 181/77 Abnormal  110 96 % -- --   12/23/22 2155 -- 87 -- 175/86 Abnormal  124 95 % None (Room air) Lying   12/23/22 2142 98 1 °F (36 7 °C) -- 20 -- -- -- -- --   12/23/22 1802 -- -- -- 161/74 -- -- -- --   12/23/22 1800 -- 81 15 161/74 106 93 % None (Room air) Lying   12/23/22 1539 97 6 °F (36 4 °C) -- -- -- -- -- -- --   12/23/22 1533 -- -- -- 215/91 Abnormal  -- -- -- --   12/23/22 1530 -- 77 17 -- -- 96 % None (Room air) Sitting       Pertinent Labs/Diagnostic Test Results:   CT head wo contrast   Final Result by Sharalyn Boeck, MD (12/23 6380)      No intracranial hemorrhage or calvarial fracture  2 2 x 1 8 cm extra-axial soft tissue mass with calcifications in the right temporal region abutting the calvarium  Based on appearance this likely represents a meningioma    No significant mass effect or midline shift  Mild chronic microvascular ischemic changes  The study was marked in [facility] for immediate notification  Workstation performed: [id number]         XR chest portable   ED Interpretation by Rupali Baeza MD (12/23 1700)   ED wet read: No acute cardiopulmonary process noted      Final Result by Joie Kasper DO (12/23 1832)      No acute cardiopulmonary disease                    Workstation performed: [id number]           Results from last 7 days   Lab Units 12/23/22  1553   SARS-COV-2  Positive*     Results from last 7 days   Lab Units 12/25/22  0516 12/24/22  0509 12/23/22  1553   WBC Thousand/uL 6 85 8 02 15 29*   HEMOGLOBIN g/dL 11 7 11 1* 13 0   HEMATOCRIT % 36 8 33 4* 39 6   PLATELETS Thousands/uL 184 195 223   NEUTROS ABS Thousands/µL 4 89  --  13 11*         Results from last 7 days   Lab Units 12/25/22  0516 12/24/22  0509 12/23/22  1553   SODIUM mmol/L 139 137 140   POTASSIUM mmol/L 3 9 3 1* 3 6   CHLORIDE mmol/L 105 103 102   CO2 mmol/L 27 24 26   ANION GAP mmol/L 7 10 12   BUN mg/dL 16 15 15   CREATININE mg/dL 0 92 0 78 0 87   EGFR ml/min/1 73sq m 55 67 59   CALCIUM mg/dL 9 2 9 0 10 0   MAGNESIUM mg/dL  --  1 9  --      Results from last 7 days   Lab Units 12/23/22  1557 12/23/22  1553   AST U/L  --  28   ALT U/L  --  17   ALK PHOS U/L  --  62   TOTAL PROTEIN g/dL  --  7 7   ALBUMIN g/dL  --  4 5   TOTAL BILIRUBIN mg/dL  --  1 00   AMMONIA umol/L 30  --      Results from last 7 days   Lab Units 12/24/22  1057   POC GLUCOSE mg/dl 100     Results from last 7 days   Lab Units 12/25/22  0516 12/24/22  0509 12/23/22  1553   GLUCOSE RANDOM mg/dL 93 117 112               Results from last 7 days   Lab Units 12/23/22  2044   D-DIMER QUANTITATIVE ug/ml FEU 1 03*         Results from last 7 days   Lab Units 12/23/22  1553   TSH 3RD GENERATON uIU/mL 0 448*     Results from last 7 days   Lab Units 12/25/22  0516 12/24/22  0509 12/23/22  2044   PROCALCITONIN ng/ml 0 06 0 06 0 05               Results from last 7 days   Lab Units 12/25/22  0516 12/23/22  2044   CRP mg/L 12 1* 21 5*             Results from last 7 days   Lab Units 12/23/22  1708   CLARITY UA  Clear   COLOR UA  Mary Jane*   SPEC GRAV UA  1 025   PH UA  6 0   GLUCOSE UA mg/dl Negative   KETONES UA mg/dl 15 (1+)*   BLOOD UA  2+*   PROTEIN UA mg/dl 2+*   NITRITE UA  Negative   BILIRUBIN UA  Negative   UROBILINOGEN UA E U /dl 0 2   LEUKOCYTES UA  Negative   WBC UA /hpf 0-1   RBC UA /hpf 0-1   BACTERIA UA /hpf None Seen   EPITHELIAL CELLS WET PREP /hpf Occasional   MUCUS THREADS  Moderate*     Results from last 7 days   Lab Units 12/23/22  1553   INFLUENZA A PCR  Negative   INFLUENZA B PCR  Negative   RSV PCR  Negative                     ED Treatment:   Medication Administration from 12/23/2022 1528 to 12/23/2022 2111       Date/Time Order Dose Route Action Comments     12/23/2022 2042 EST busPIRone (BUSPAR) tablet 5 mg 5 mg Oral Given --     12/23/2022 2047 EST enoxaparin (LOVENOX) subcutaneous injection 30 mg 30 mg Subcutaneous Given --     12/23/2022 2040 EST cefTRIAXone (ROCEPHIN) IVPB (premix in dextrose) 1,000 mg 50 mL 1,000 mg Intravenous New Bag --     12/23/2022 2048 EST dextrose 5 % and sodium chloride 0 45 % infusion 75 mL/hr Intravenous New Bag --          Present on Admission:  • Acute encephalopathy  • COVID-19  • Meningioma (HCC)  • Hypertensive urgency  • Anxiety  • Stage 3b chronic kidney disease (HCC)  • Hyperthyroidism      Admitting Diagnosis: Acute encephalopathy [G93 40]  Altered mental status, unspecified [R41 82]  COVID-19 [U07 1]  Age/Sex: 80 y o  female  Admission Orders:  Scheduled Medications:  amLODIPine, 5 mg, Oral, Daily  aspirin, 81 mg, Oral, Daily  busPIRone, 5 mg, Oral, BID  cefTRIAXone, 1,000 mg, Intravenous, Q24H  enoxaparin, 30 mg, Subcutaneous, Q12H BACILIO      Continuous IV Infusions:     PRN Meds:  acetaminophen, 650 mg, Oral, Q6H PRN  clonazePAM, 0 25 mg, Oral, BID PRN  hydrALAZINE, 5 mg, Intravenous, Q6H PRN          Network Utilization Review Department  ATTENTION: Please call with any questions or concerns to [phone] and carefully listen to the prompts so that you are directed to the right person  All voicemails are confidential   Stacy Wesley all requests for admission clinical reviews, approved or denied determinations and any other requests to dedicated fax number below belonging to the campus where the patient is receiving treatment   List of dedicated fax numbers for the Facilities:  [address] DENIALS (Administrative/Medical Necessity) [phone]   [address] (Maternity/NICU/Pediatrics) [phone]   [facility] Yesika Montgomery [phone]   [place] [address] [phone]   [address] [place] [address] [facility] [phone]   155 [facility] 134 815 [facility] [phone]

## 2022-12-27 NOTE — PROGRESS NOTES
[address]  Progress Note - Jamie Semen 7/30/1933, 80 y o  female MRN: [id number]  Unit/Bed#: ICU 10-01 Encounter: [id number]  Primary Care Provider: Courtnye Garcia MD   Date and time admitted to hospital: 12/23/2022  3:35 PM    * Acute encephalopathy  Assessment & Plan  · CT head with finding of meningioma which has been seen on previous imaging  · Possibly secondary to medications versus acute viral illness  · Will continue supportive care  · Home Klonopin dose reduced  · Nonfocal neuro exam at this time    Frequent falls  Assessment & Plan  · Per family at bedside patient has had multiple falls over the last few weeks  · PT/OT eval recommending rehab  · Case management for discharge planning    Hypertensive urgency  Assessment & Plan  · BP significantly elevated in the ER with SBP greater than 200  · Pressure has gradually improved  · Continue Norvasc 5 mg daily  · Monitor BP trends while admitted and adjust medications as indicated    COVID-19  Assessment & Plan  · Son is also COVID positive and admitted to the hospital  · COVID + 12/23/2022  · CXR (12/23): No acute cardiopulmonary disease    · As she is not requiring supplemental oxygen, will not begin Remdesivir or Decadron  · Will continue supportive care    Stage 3b chronic kidney disease [facility])  Assessment & Plan  Lab Results   Component Value Date    EGFR 55 12/25/2022    EGFR 67 12/24/2022    EGFR 59 12/23/2022    CREATININE 0 92 12/25/2022    CREATININE 0 78 12/24/2022    CREATININE 0 87 12/23/2022   · Creatinine at baseline  · Will continue routine lab monitoring    [facility])  Assessment & Plan  · This is a previously known finding  · CT imaging results appreciated    Hyperthyroidism  Assessment & Plan  · TSH 0 448, T4 free within normal limits    Anxiety  Assessment & Plan  · Continue home Buspar and Klonopin (at reduced dose)      VTE Pharmacologic Prophylaxis: Lovenox    Patient Centered Rounds:  Patient care rounds were performed with nursing    Discussions with Specialists or Other Care Team Provider: Case management, nursing, PT/OT    Education and Discussions with Family / Patient: Patient's son is currently intubated in the intensive care unit    Time Spent for Care: 30  More than 50% of total time spent on counseling and coordination of care as described above  Current Length of Stay: 4 day(s)    Current Patient Status: Inpatient     Certification Statement: The patient will continue to require additional inpatient hospital stay due to placement to short-term rehab    Discharge Plan: Medically stable for discharge to short-term rehab    Code Status: Level 3 - DNAR and DNI      Subjective:   Patient seen and examined at bedside  No acute events overnight  Denies chest pain, SOB, diaphoresis, nausea/vomiting/diarrhea, fevers/chills  Objective:     Vitals:   Temp (24hrs), Av 9 °F (36 6 °C), Min:97 6 °F (36 4 °C), Max:98 1 °F (36 7 °C)    Temp:  [97 6 °F (36 4 °C)-98 1 °F (36 7 °C)] 97 6 °F (36 4 °C)  HR:  [79-94] 79  Resp:  [16-18] 16  BP: (168-169)/(73-75) 168/75  SpO2:  [95 %-97 %] 95 %  Body mass index is 21 33 kg/m²  Input and Output Summary (last 24 hours): Intake/Output Summary (Last 24 hours) at 2022 1032  Last data filed at 2022 0600  Gross per 24 hour   Intake 50 ml   Output 450 ml   Net -400 ml       Physical Exam:     Physical Exam  Vitals and nursing note reviewed  Constitutional:       General: She is not in acute distress  Appearance: She is well-developed  HENT:      Head: Normocephalic and atraumatic  Eyes:      Conjunctiva/sclera: Conjunctivae normal    Cardiovascular:      Rate and Rhythm: Normal rate and regular rhythm  Heart sounds: No murmur heard  Pulmonary:      Effort: Pulmonary effort is normal  No respiratory distress  Breath sounds: Normal breath sounds  Abdominal:      Palpations: Abdomen is soft  Tenderness:  There is no abdominal tenderness  Musculoskeletal:         General: No swelling  Cervical back: Neck supple  Skin:     General: Skin is warm and dry  Capillary Refill: Capillary refill takes less than 2 seconds  Neurological:      Mental Status: She is alert  Psychiatric:         Mood and Affect: Mood normal          Additional Data:     Labs: I have reviewed pertinent results     Results from last 7 days   Lab Units 12/25/22  0516   WBC Thousand/uL 6 85   HEMOGLOBIN g/dL 11 7   HEMATOCRIT % 36 8   PLATELETS Thousands/uL 184   NEUTROS PCT % 71   LYMPHS PCT % 18   MONOS PCT % 9   EOS PCT % 1     Results from last 7 days   Lab Units 12/25/22  0516 12/24/22  0509 12/23/22  1553   SODIUM mmol/L 139   < > 140   POTASSIUM mmol/L 3 9   < > 3 6   CHLORIDE mmol/L 105   < > 102   CO2 mmol/L 27   < > 26   BUN mg/dL 16   < > 15   CREATININE mg/dL 0 92   < > 0 87   ANION GAP mmol/L 7   < > 12   CALCIUM mg/dL 9 2   < > 10 0   ALBUMIN g/dL  --   --  4 5   TOTAL BILIRUBIN mg/dL  --   --  1 00   ALK PHOS U/L  --   --  62   ALT U/L  --   --  17   AST U/L  --   --  28   GLUCOSE RANDOM mg/dL 93   < > 112    < > = values in this interval not displayed           Results from last 7 days   Lab Units 12/24/22  1057   POC GLUCOSE mg/dl 100         Results from last 7 days   Lab Units 12/25/22  0516 12/24/22  0509 12/23/22  2044   PROCALCITONIN ng/ml 0 06 0 06 0 05         Imaging: I have reviewed pertinent imaging       Recent Cultures (last 7 days):           Last 24 Hours Medication List:   Current Facility-Administered Medications   Medication Dose Route Frequency Provider Last Rate   • acetaminophen  650 mg Oral Q6H PRN Verlie Flirt Aj, DO     • amLODIPine  5 mg Oral Daily [facility] Aj, DO     • aspirin  81 mg Oral Daily [facility] Aj, DO     • busPIRone  5 mg Oral BID Verlie Flirt Aj, DO     • cefTRIAXone  1,000 mg Intravenous Q24H Celestina Gallegos PA-C 1,000 mg (12/26/22 2019)   • clonazePAM  0 25 mg Oral BID PRN Ashlie Loveshaquille Aj, DO     • enoxaparin  30 mg Subcutaneous Q12H [facility] [place]     • hydrALAZINE  5 mg Intravenous Q6H PRN Ashlie Loves[place]ille Aj, DO          Today, Patient Was Seen By: Annelise Ritchie DO    ** Please Note: Dictation voice to text software may have been used in the creation of this document   **

## 2022-12-27 NOTE — CASE MANAGEMENT
Case Management Assessment & Discharge Planning Note    Patient name Shelton Barragan  Location ICU 10/ICU 10-01 MRN [id number]  : 1933 Date 2022       Current Admission Date: 2022  Current Admission Diagnosis:Acute encephalopathy   Patient Active Problem List    Diagnosis Date Noted   • Frequent falls 2022   • Acute encephalopathy 2022   • COVID-19 2022   • Hypertensive urgency 2022   • Stage 3b chronic kidney disease ([facility] [address] ) 2022   • Meningioma ([facility] [address] ) 2015   • Arthritis 2014   • Anxiety 2014   • Hyperlipidemia 2014   • Hyperthyroidism 2014      LOS (days): 4  Geometric Mean LOS (GMLOS) (days):   Days to GMLOS:     OBJECTIVE:    Risk of Unplanned Readmission Score: 6 08     Current admission status: Inpatient  Preferred Pharmacy:   [address] [place] [address]  Phone: [phone] Fax: [phone]    Primary Care Provider: Nitish Ulloa MD    Primary Insurance: [facility]  Secondary Insurance:     ASSESSMENT:  Lara Townsend Proxies    There are no active Health Care Proxies on file  Advance Directives  Does patient have a Health Care POA?:  (Pt not sure  Pt son is admitted to ICU )  Does patient have Advance Directives?: Yes  Advance Directives: Living will  Primary Contact: States has a living will which her son is the one to make medical decisions  Son is currently a pt in ICU vented    Readmission Root Cause  30 Day Readmission: No    Patient Information  Admitted from[de-identified] Home  Mental Status: Alert  During Assessment patient was accompanied by: Not accompanied during assessment  Assessment information provided by[de-identified] Patient  Primary Caregiver: Self  Support Systems: Son  [place] of Residence: [address] [place] [address] do you live in?: [address] [place] [address] Street entry access options   Select all that apply : Stairs  Number of steps to enter home : 4  Do the steps have railings?: Yes  Type of Current Residence: 2 story home  Upon entering residence, is there a bedroom on the main floor (no further steps)?: Yes  Upon entering residence, is there a bathroom on the main floor (no further steps)?: Yes  In the last 12 months, was there a time when you were not able to pay the mortgage or rent on time?: No  In the last 12 months, how many places have you lived?: 1  In the last 12 months, was there a time when you did not have a steady place to sleep or slept in a shelter (including now)?: No  Homeless/housing insecurity resource given?: N/A  Living Arrangements: Lives w/ Son (Son and DIL live in the basement)  Is patient a ?: No    Activities of Daily Living Prior to Admission  Functional Status: Independent  Completes ADLs independently?: Yes  Ambulates independently?: Yes  Does patient use assisted devices?: No  Does patient currently own DME?: No  Does patient have a history of Outpatient Therapy (PT/OT)?: No  Does the patient have a history of Short-Term Rehab?: No  Does patient have a history of HHC?: No  Does patient currently have FTRANSu 78?: No    Patient Information Continued  Income Source: Pension/longterm  Does patient have prescription coverage?: Yes  Within the past 12 months, you worried that your food would run out before you got the money to buy more : Never true  Within the past 12 months, the food you bought just didn't last and you didn't have money to get more : Never true  Food insecurity resource given?: N/A  Does patient receive dialysis treatments?: No  Does patient have a history of substance abuse?: No  Does patient have a history of Mental Health Diagnosis?: No    PHQ 2/9 Screening   Reviewed PHQ 2/9 Depression Screening Score?: No    Means of Transportation  Means of Transport to Appts[de-identified] Family transport  In the past 12 months, has lack of transportation kept you from medical appointments or from getting medications?: No  In the past 12 months, has lack of transportation kept you from meetings, work, or from getting things needed for daily living?: No  Was application for public transport provided?: N/A  DISCHARGE DETAILS:    Discharge planning discussed with[de-identified] Pt  Freedom of Choice: Yes  Comments - Freedom of Choice: Pt is agreeable to a blanket referral being made for STR  CM contacted family/caregiver?: No- see comments (Pt son whom she lives with is a pt in ICU)  Were Treatment Team discharge recommendations reviewed with patient/caregiver?: Yes  Did patient/caregiver verbalize understanding of patient care needs?: Yes  Were patient/caregiver advised of the risks associated with not following Treatment Team discharge recommendations?: Yes (Pt isn agreeable to STR)    Contacts  Patient Contacts: Pt also has a daughter Sandro Reddy [phone]  Relationship to Patient[de-identified] Family  Contact Method: Phone  Phone Number: [phone]  Reason/Outcome: Discharge Planning    Would you like to participate in our [address] service program?  : No - Declined    Treatment Team Recommendation: Short Term Rehab  Discharge Destination Plan[de-identified] Short Term Rehab   PT is recommending STR  Pt is agreeable to same  A blanket referral was made in [address] [facility] [address]  Son is currently in ICU  Pt provided her daughter Bubba Duran [phone]  TC to pt daughter left a  for a return call

## 2022-12-28 LAB
ANION GAP SERPL CALCULATED.3IONS-SCNC: 7 MMOL/L (ref 4–13)
BASOPHILS # BLD AUTO: 0.03 THOUSANDS/ÂΜL (ref 0–0.1)
BASOPHILS NFR BLD AUTO: 1 % (ref 0–1)
BUN SERPL-MCNC: 22 MG/DL (ref 5–25)
CALCIUM SERPL-MCNC: 9.1 MG/DL (ref 8.4–10.2)
CHLORIDE SERPL-SCNC: 103 MMOL/L (ref 96–108)
CO2 SERPL-SCNC: 26 MMOL/L (ref 21–32)
CREAT SERPL-MCNC: 0.97 MG/DL (ref 0.6–1.3)
EOSINOPHIL # BLD AUTO: 0.12 THOUSAND/ÂΜL (ref 0–0.61)
EOSINOPHIL NFR BLD AUTO: 2 % (ref 0–6)
ERYTHROCYTE [DISTWIDTH] IN BLOOD BY AUTOMATED COUNT: 13.2 % (ref 11.6–15.1)
GFR SERPL CREATININE-BSD FRML MDRD: 51 ML/MIN/1.73SQ M
GLUCOSE SERPL-MCNC: 89 MG/DL (ref 65–140)
GLUCOSE SERPL-MCNC: 98 MG/DL (ref 65–140)
HCT VFR BLD AUTO: 33.4 % (ref 34.8–46.1)
HGB BLD-MCNC: 10.9 G/DL (ref 11.5–15.4)
IMM GRANULOCYTES # BLD AUTO: 0.01 THOUSAND/UL (ref 0–0.2)
IMM GRANULOCYTES NFR BLD AUTO: 0 % (ref 0–2)
LYMPHOCYTES # BLD AUTO: 1.01 THOUSANDS/ÂΜL (ref 0.6–4.47)
LYMPHOCYTES NFR BLD AUTO: 17 % (ref 14–44)
MAGNESIUM SERPL-MCNC: 1.9 MG/DL (ref 1.9–2.7)
MCH RBC QN AUTO: 31.7 PG (ref 26.8–34.3)
MCHC RBC AUTO-ENTMCNC: 32.6 G/DL (ref 31.4–37.4)
MCV RBC AUTO: 97 FL (ref 82–98)
MONOCYTES # BLD AUTO: 0.48 THOUSAND/ÂΜL (ref 0.17–1.22)
MONOCYTES NFR BLD AUTO: 8 % (ref 4–12)
NEUTROPHILS # BLD AUTO: 4.22 THOUSANDS/ÂΜL (ref 1.85–7.62)
NEUTS SEG NFR BLD AUTO: 72 % (ref 43–75)
NRBC BLD AUTO-RTO: 0 /100 WBCS
PHOSPHATE SERPL-MCNC: 3.3 MG/DL (ref 2.3–4.1)
PLATELET # BLD AUTO: 187 THOUSANDS/UL (ref 149–390)
PMV BLD AUTO: 11.2 FL (ref 8.9–12.7)
POTASSIUM SERPL-SCNC: 3.8 MMOL/L (ref 3.5–5.3)
RBC # BLD AUTO: 3.44 MILLION/UL (ref 3.81–5.12)
SODIUM SERPL-SCNC: 136 MMOL/L (ref 135–147)
WBC # BLD AUTO: 5.87 THOUSAND/UL (ref 4.31–10.16)

## 2022-12-28 RX ORDER — ENOXAPARIN SODIUM 100 MG/ML
30 INJECTION SUBCUTANEOUS
Status: DISCONTINUED | OUTPATIENT
Start: 2022-12-28 | End: 2022-12-29 | Stop reason: HOSPADM

## 2022-12-28 RX ORDER — ENOXAPARIN SODIUM 100 MG/ML
40 INJECTION SUBCUTANEOUS
Status: DISCONTINUED | OUTPATIENT
Start: 2022-12-28 | End: 2022-12-28

## 2022-12-28 RX ADMIN — ASPIRIN 81 MG: 81 TABLET, COATED ORAL at 08:40

## 2022-12-28 RX ADMIN — BUSPIRONE HYDROCHLORIDE 5 MG: 5 TABLET ORAL at 17:07

## 2022-12-28 RX ADMIN — AMLODIPINE BESYLATE 5 MG: 5 TABLET ORAL at 08:40

## 2022-12-28 RX ADMIN — ACETAMINOPHEN 650 MG: 325 TABLET ORAL at 21:01

## 2022-12-28 RX ADMIN — BUSPIRONE HYDROCHLORIDE 5 MG: 5 TABLET ORAL at 08:40

## 2022-12-28 RX ADMIN — ENOXAPARIN SODIUM 30 MG: 100 INJECTION SUBCUTANEOUS at 20:52

## 2022-12-28 NOTE — CASE MANAGEMENT
Kati York 50 received request for authorization from Care Manager    Authorization request for: SNF  Facility Name: [facility]  NPI: [id number]  Facility MD:  Dr Cristal Sunshine: [id number]  Authorization initiated by contacting: VADIM Via: Availity  Pending Reference #: [id number]   Clinicals submitted via: [employer]

## 2022-12-28 NOTE — CASE MANAGEMENT
Case Management Discharge Planning Note    Patient name Gus Rose  Location [address] 207/-09 MRN [id number]  : 1933 Date 2022       Current Admission Date: 2022  Current Admission Diagnosis:Acute encephalopathy   Patient Active Problem List    Diagnosis Date Noted   • Frequent falls 2022   • Acute encephalopathy 2022   • COVID-19 2022   • Hypertensive urgency 2022   • Stage 3b chronic kidney disease ([facility] [address] ) 2022   • Meningioma ([facility] [address] ) 2015   • Arthritis 2014   • Anxiety 2014   • Hyperlipidemia 2014   • Hyperthyroidism 2014      LOS (days): 5  Geometric Mean LOS (GMLOS) (days): 2 70  Days to GMLOS:-2 2     OBJECTIVE:  Risk of Unplanned Readmission Score: 7 57     Current admission status: Inpatient   Preferred Pharmacy:   [address] [place] [address]  Phone: [phone] Fax: [phone]    Primary Care Provider: Mariel Medina MD    Primary Insurance: [facility] REP  Secondary Insurance:     [facility] Street Name, [address] : [address] and Rehab 212 270 [facility]  Receiving Facility/Agency Phone Number: [phone]  Receiving facility Fax number: [phone]   Notified admissions of the approval from insurance for [facility]  Pt can go in the am   Sent for transport via [employer]  Sent a referral to CM support to determine if the pt needs auth for transport  Received notification from [facility] the pt will be transported via [facility] BLS at 0900

## 2022-12-28 NOTE — ASSESSMENT & PLAN NOTE
· Acute toxic metabolic encephalopathy  · Resolved  · Likely secondary to COVID-19 viral pneumonia and sedating medications which have been since reduced  · AAOx4  · CT head with finding of meningioma which has been seen on previous imaging  · Possibly secondary to medications versus acute viral illness  · Will continue supportive care  · Home Klonopin dose reduced  · Nonfocal neuro exam at this time

## 2022-12-28 NOTE — ASSESSMENT & PLAN NOTE
Lab Results   Component Value Date    EGFR 51 12/28/2022    EGFR 55 12/25/2022    EGFR 67 12/24/2022    CREATININE 0 97 12/28/2022    CREATININE 0 92 12/25/2022    CREATININE 0 78 12/24/2022   · Creatinine at baseline  · Will continue routine lab monitoring

## 2022-12-28 NOTE — UTILIZATION REVIEW
Continued Stay Review    Date: 12/28/22                          Current Patient Class: inpatient  Current Level of Care: ICU    HPI:89 y o  female initially admitted on 12/23/22 Acute encephalopathy     Assessment/Plan:  Home Klonopin dose reduced, continue to monitor BP and adjust meds prn, continue supportive measures of COVID-19, continue to monitor Cr dt CKD, continue home meds  ABXs were given 12/23-12/27, see below  PT/OT recommend rehab, CM working on placement to short-term rehab with referrals sent      Vital Signs:   Date/Time Temp Pulse Resp BP MAP (mmHg) SpO2 O2 Device Patient Position - Orthostatic VS   12/28/22 0839 99 1 °F (37 3 °C) 93 18 138/63 91 93 % -- --   12/28/22 0400 99 8 °F (37 7 °C) -- -- -- -- -- -- --   12/28/22 0000 100 4 °F (38 °C) 74 16 120/58 89 93 % None (Room air) --   12/27/22 2000 98 7 °F (37 1 °C) 75 -- -- -- 93 % None (Room air) --   12/27/22 1723 -- 87 16 143/67 97 94 % None (Room air) Lying   12/27/22 1500 98 °F (36 7 °C) -- -- -- -- -- -- --   12/27/22 1334 -- 85 16 165/72 104 96 % None (Room air) Sitting   12/27/22 0730 -- 79 16 168/75 108 95 % None (Room air) --   12/27/22 0700 97 6 °F (36 4 °C) -- -- -- -- -- -- --   12/27/22 0045 98 1 °F (36 7 °C) 94 18 169/73 115 96 % None (Room air) Lying   12/26/22 2100 -- 86 -- -- -- 97 % -- --   12/26/22 2000 -- 85 -- -- -- 96 % -- --   12/26/22 1000 -- 86 -- 141/66 95 -- -- --   12/26/22 0900 -- 94 -- 129/63 90 -- -- --   12/26/22 0800 -- 83 -- 143/67 96 -- -- --   12/26/22 0700 98 9 °F (37 2 °C) 83 20 130/63 90 95 % -- Lying   12/26/22 0600 -- 74 -- 133/61 88 96 % -- --   12/26/22 0524 -- 75 -- 182/82 Abnormal  118 95 % -- --   12/26/22 0400 -- 70 -- 154/67 96 94 % -- --   12/26/22 0300 -- 69 -- 149/69 99 93 % -- --   12/26/22 0200 -- 76 -- 161/74 106 94 % -- --   12/26/22 0100 -- 65 -- 157/68 98 94 % -- --   12/26/22 0000 97 9 °F (36 6 °C) 69 16 142/67 96 94 % None (Room air) --       Pertinent Labs/Diagnostic Results:   Results from last 7 days   Lab Units 12/23/22  1553   SARS-COV-2  Positive*     Results from last 7 days   Lab Units 12/28/22  0543 12/25/22  0516 12/24/22  0509 12/23/22  1553   WBC Thousand/uL 5 87 6 85 8 02 15 29*   HEMOGLOBIN g/dL 10 9* 11 7 11 1* 13 0   HEMATOCRIT % 33 4* 36 8 33 4* 39 6   PLATELETS Thousands/uL 187 184 195 223   NEUTROS ABS Thousands/µL 4 22 4 89  --  13 11*         Results from last 7 days   Lab Units 12/28/22  0543 12/25/22  0516 12/24/22  0509 12/23/22  1553   SODIUM mmol/L 136 139 137 140   POTASSIUM mmol/L 3 8 3 9 3 1* 3 6   CHLORIDE mmol/L 103 105 103 102   CO2 mmol/L 26 27 24 26   ANION GAP mmol/L 7 7 10 12   BUN mg/dL 22 16 15 15   CREATININE mg/dL 0 97 0 92 0 78 0 87   EGFR ml/min/1 73sq m 51 55 67 59   CALCIUM mg/dL 9 1 9 2 9 0 10 0   MAGNESIUM mg/dL 1 9  --  1 9  --    PHOSPHORUS mg/dL 3 3  --   --   --      Results from last 7 days   Lab Units 12/23/22  1557 12/23/22  1553   AST U/L  --  28   ALT U/L  --  17   ALK PHOS U/L  --  62   TOTAL PROTEIN g/dL  --  7 7   ALBUMIN g/dL  --  4 5   TOTAL BILIRUBIN mg/dL  --  1 00   AMMONIA umol/L 30  --      Results from last 7 days   Lab Units 12/28/22  0008 12/24/22  1057   POC GLUCOSE mg/dl 89 100     Results from last 7 days   Lab Units 12/28/22  0543 12/25/22  0516 12/24/22  0509 12/23/22  1553   GLUCOSE RANDOM mg/dL 98 93 117 112     Results from last 7 days   Lab Units 12/23/22  2044   D-DIMER QUANTITATIVE ug/ml FEU 1 03*         Results from last 7 days   Lab Units 12/23/22  1553   TSH 3RD GENERATON uIU/mL 0 448*     Results from last 7 days   Lab Units 12/25/22  0516 12/24/22  0509 12/23/22  2044   PROCALCITONIN ng/ml 0 06 0 06 0 05     Results from last 7 days   Lab Units 12/25/22  0516 12/23/22  2044   CRP mg/L 12 1* 21 5*     Results from last 7 days   Lab Units 12/23/22  1708   CLARITY UA  Clear   COLOR UA  Mary Jane*   SPEC GRAV UA  1 025   PH UA  6 0   GLUCOSE UA mg/dl Negative   KETONES UA mg/dl 15 (1+)*   BLOOD UA  2+*   PROTEIN UA mg/dl 2+*   NITRITE UA  Negative   BILIRUBIN UA  Negative   UROBILINOGEN UA E U /dl 0 2   LEUKOCYTES UA  Negative   WBC UA /hpf 0-1   RBC UA /hpf 0-1   BACTERIA UA /hpf None Seen   EPITHELIAL CELLS WET PREP /hpf Occasional   MUCUS THREADS  Moderate*     Results from last 7 days   Lab Units 12/23/22  1553   INFLUENZA A PCR  Negative   INFLUENZA B PCR  Negative   RSV PCR  Negative     Medications:   Scheduled Medications:  amLODIPine, 5 mg, Oral, Daily  aspirin, 81 mg, Oral, Daily  busPIRone, 5 mg, Oral, BID  enoxaparin, 30 mg, Subcutaneous, Q24H BACILIO    cefTRIAXone (ROCEPHIN) IVPB (premix in dextrose) 1,000 mg 50 mL  Dose: 1,000 mg  Freq: Every 24 hours Route: IV  Last Dose: 1,000 mg (12/26/22 2019)  Start: 12/23/22 2000 End: 12/27/22 1037    Continuous IV Infusions:   dextrose 5 % and sodium chloride 0 45 % infusion  Rate: 75 mL/hr Dose: 75 mL/hr  Freq: Continuous Route: IV  Last Dose: 75 mL/hr (12/23/22 2048)  Start: 12/23/22 2000 End: 12/24/22 1218     PRN Meds:  acetaminophen, 650 mg, Oral, Q6H PRN x1 thus far  clonazePAM, 0 25 mg, Oral, BID PRN  hydrALAZINE, 5 mg, Intravenous, Q6H PRN x2 thus far        Discharge Plan: d    Network Utilization Review Department  ATTENTION: Please call with any questions or concerns to [phone] and carefully listen to the prompts so that you are directed to the right person  All voicemails are confidential   Carmen Torres all requests for admission clinical reviews, approved or denied determinations and any other requests to dedicated fax number below belonging to the campus where the patient is receiving treatment   List of dedicated fax numbers for the Facilities:  [address] [place] [address] DENIALS (Administrative/Medical Necessity) [phone]   [address] (Maternity/NICU/Pediatrics) [phone]   [address] [phone]   [facility] [phone]     [address] [facility] [place] 435 [facility] [place] [address] [facility] 28 [address] [facility] 310 [facility] [place] [address] [phone]

## 2022-12-28 NOTE — ASSESSMENT & PLAN NOTE
· Resolved  · BP significantly elevated in the ER with SBP greater than 200 mmHg  · Pressure has gradually improved  · Continue Norvasc 5 mg daily  · Monitor BP trends while admitted and adjust medications as indicated

## 2022-12-28 NOTE — ASSESSMENT & PLAN NOTE
· Per family at bedside patient has had multiple falls over the last few weeks  · PT/OT eval recommending rehab  · Case management for discharge planning  · Patient is medically stable for discharge to short-term rehab

## 2022-12-28 NOTE — CASE MANAGEMENT
Kati Vaughn has received approved authorization from:   VADIM  Authorization received for: [facility]  Facility: [facility] #: [id number]   Start of Care: 12/28  Next Review Date: 1/4  Care Coordinator: Jessica FLORES#:  [phone]   Submit next review to: [phone]   Care Manager notified: Gifty Oliva

## 2022-12-28 NOTE — CASE MANAGEMENT
Case Management Discharge Planning Note    Patient name Nusrat Redd  Location [address] 207/-02 MRN [id number]  : 1933 Date 2022       Current Admission Date: 2022  Current Admission Diagnosis:Acute encephalopathy   Patient Active Problem List    Diagnosis Date Noted   • Frequent falls 2022   • Acute encephalopathy 2022   • COVID-19 2022   • Hypertensive urgency 2022   • Stage 3b chronic kidney disease ([facility] [address] ) 2022   • Meningioma ([facility] [address] ) 2015   • Arthritis 2014   • Anxiety 2014   • Hyperlipidemia 2014   • Hyperthyroidism 2014      LOS (days): 5  Geometric Mean LOS (GMLOS) (days): 2 70  Days to GMLOS:-2 1     OBJECTIVE:  Risk of Unplanned Readmission Score: 7 57     Current admission status: Inpatient   Preferred Pharmacy:   [address] [place] [address]  Phone: [phone] Fax: [phone]    Primary Care Provider: Misti Arias MD    Primary Insurance: [facility] REP  Secondary Insurance:     DISCHARGE DETAILS:  Provided the list of accepting facilities with the pt who choose [address] [facility] Re[facility]  Sent to  support for authorization  Asked pt if she wanted her daughter called, however did not  TC to son's SO Everguy Hiram SULTANA lives with the pt to notify of the information on MARLYS SULTANA is agreeable to MARL[facility]  Reviewed the IMM earlier with the pt who was in agreement of same

## 2022-12-28 NOTE — PROGRESS NOTES
-- Patient:  -- MRN: [id number]  -- Aidin Request ID: [id number]  -- Level of care reserved: [facility]  -- Partner Reserved: [facility], [address] [place] [address] [phone]  -- Clinical needs requested:  -- Geography searched: 20 miles around [phone]  -- Start of Service:  -- Request sent: 3:17pm EST on 12/27/2022 by [employer]  -- Partner reserved: 9:03am EST on 12/28/2022 by [employer]  -- Choice list shared: 8:48am EST on 12/28/2022 by [employer]

## 2022-12-28 NOTE — CASE MANAGEMENT
Case Management Discharge Planning Note    Patient name Lupita Libman  Location [address] 207/-69 MRN [id number]  : 1933 Date 2022       Current Admission Date: 2022  Current Admission Diagnosis:Acute encephalopathy   Patient Active Problem List    Diagnosis Date Noted   • Frequent falls 2022   • Acute encephalopathy 2022   • COVID-19 2022   • Hypertensive urgency 2022   • Stage 3b chronic kidney disease ([facility] [address] ) 2022   • Meningioma ([facility] ) 2015   • Arthritis 2014   • Anxiety 2014   • Hyperlipidemia 2014   • Hyperthyroidism 2014      LOS (days): 5  Geometric Mean LOS (GMLOS) (days): 2 70  Days to GMLOS:-2 2     OBJECTIVE:  Risk of Unplanned Readmission Score: 7 57     Current admission status: Inpatient   Preferred Pharmacy:   [address] [place] [address]  Phone: [phone] Fax: [phone]    Primary Care Provider: Keyla Quinn MD    Primary Insurance: [facility] REP  Secondary Insurance:     DISCHARGE DETAILS:  Received authorization for [place]  Notified the facility of same and asking if the pt can come today

## 2022-12-28 NOTE — PROGRESS NOTES
-- Patient:  -- MRN: [id number]  -- Aidin Request ID: [id number]  -- Level of care reserved: [facility]  -- Partner Reserved: [facility], [address] [place] [address] [phone]  -- Clinical needs requested:  -- Geography searched: 20 miles around [phone]  -- Start of Service:  -- Request sent: 3:17pm EST on 12/27/2022 by Dipika Manning  -- Partner reserved: 9:03am EST on 12/28/2022 by Diipka Manning  -- Choice list shared: 8:48am EST on 12/28/2022 by Dipika Manning

## 2022-12-28 NOTE — CASE MANAGEMENT
Case Management Discharge Planning Note    Patient name Rafi Blackman  Location [address] 207/-80 MRN [id number]  : 1933 Date 2022       Current Admission Date: 2022  Current Admission Diagnosis:Acute encephalopathy   Patient Active Problem List    Diagnosis Date Noted   • Frequent falls 2022   • Acute encephalopathy 2022   • COVID-19 2022   • Hypertensive urgency 2022   • [facility] 20[facility]   • Meningioma ([facility] ) 2015   • Arthritis 2014   • Anxiety 2014   • Hyperlipidemia 2014   • Hyperthyroidism 2014      LOS (days): 5  Geometric Mean LOS (GMLOS) (days): 2 70  Days to GMLOS:-2 1     OBJECTIVE:  Risk of Unplanned Readmission Score: 7 57         Current admission status: Inpatient   Preferred Pharmacy:   [address] [place] [address]  Phone: [phone] Fax: [phone]    Primary Care Provider:  Elizabeth Lu MD    Primary Insurance: [address]  Secondary Insurance:     [address] [place] [address] Avenue Number: [id number]

## 2022-12-28 NOTE — PROGRESS NOTES
[address]  Progress Note - Evelyn Guillaume 7/30/1933, 80 y o  female MRN: [id number]  Unit/Bed#: -01 Encounter: [id number]  Primary Care Provider: Alfreda Emerson MD   Date and time admitted to hospital: 12/23/2022  3:35 PM    * Acute encephalopathy  Assessment & Plan  · Acute toxic metabolic encephalopathy  · Resolved  · Likely secondary to COVID-19 viral pneumonia and sedating medications which have been since reduced  · AAOx4  · CT head with finding of meningioma which has been seen on previous imaging  · Possibly secondary to medications versus acute viral illness  · Will continue supportive care  · Home Klonopin dose reduced  · Nonfocal neuro exam at this time    Frequent falls  Assessment & Plan  · Per family at bedside patient has had multiple falls over the last few weeks  · PT/OT eval recommending rehab  · Case management for discharge planning  · Patient is medically stable for discharge to short-term rehab    Hypertensive urgency  Assessment & Plan  · Resolved  · BP significantly elevated in the ER with SBP greater than 200 mmHg  · Pressure has gradually improved  · Continue Norvasc 5 mg daily  · Monitor BP trends while admitted and adjust medications as indicated    COVID-19  Assessment & Plan  · Son is also COVID positive and admitted to the hospital  · COVID + 12/23/2022  · CXR (12/23): No acute cardiopulmonary disease    · As she is not requiring supplemental oxygen, will not begin Remdesivir or Decadron  · Will continue supportive care    Stage 3b chronic kidney disease [facility])  Assessment & Plan  Lab Results   Component Value Date    EGFR 51 12/28/2022    EGFR 55 12/25/2022    EGFR 67 12/24/2022    CREATININE 0 97 12/28/2022    CREATININE 0 92 12/25/2022    CREATININE 0 78 12/24/2022   · Creatinine at baseline  · Will continue routine lab monitoring    [facility])  Assessment & Plan  · This is a previously known finding  · CT imaging results appreciated    Hyperthyroidism  Assessment & Plan  · TSH 0 448, T4 free within normal limits    Anxiety  Assessment & Plan  · Continue home Buspar and Klonopin (at reduced dose)    VTE Pharmacologic Prophylaxis: Lovenox    Patient Centered Rounds:  Patient care rounds were performed with nursing    Discussions with Specialists or Other Care Team Provider: Case management, nursing, PT/OT    Education and Discussions with Family / Patient: Patient's son is currently intubated in the intensive care unit    Time Spent for Care: 30  More than 50% of total time spent on counseling and coordination of care as described above  Current Length of Stay: 5 day(s)    Current Patient Status: Inpatient     Certification Statement: The patient will continue to require additional inpatient hospital stay due to placement to short-term rehab    Discharge Plan: Medically stable for discharge to short-term rehab    Code Status: Level 3 - DNAR and DNI      Subjective:   Patient seen and examined at bedside  No acute events overnight  Denies chest pain, SOB, diaphoresis, nausea/vomiting/diarrhea, fevers/chills  Objective:     Vitals:   Temp (24hrs), Av 5 °F (37 5 °C), Min:98 7 °F (37 1 °C), Max:100 4 °F (38 °C)    Temp:  [98 7 °F (37 1 °C)-100 4 °F (38 °C)] 99 1 °F (37 3 °C)  HR:  [74-93] 83  Resp:  [16-18] 18  BP: (120-147)/(58-67) 147/64  SpO2:  [93 %-94 %] 93 %  Body mass index is 21 33 kg/m²  Input and Output Summary (last 24 hours): Intake/Output Summary (Last 24 hours) at 2022 1517  Last data filed at 2022 0901  Gross per 24 hour   Intake 780 ml   Output 500 ml   Net 280 ml       Physical Exam:     Physical Exam  Vitals and nursing note reviewed  Constitutional:       General: She is not in acute distress  Appearance: She is well-developed  HENT:      Head: Normocephalic and atraumatic     Eyes:      Conjunctiva/sclera: Conjunctivae normal    Cardiovascular:      Rate and Rhythm: Normal rate and regular rhythm  Heart sounds: No murmur heard  Pulmonary:      Effort: Pulmonary effort is normal  No respiratory distress  Breath sounds: Normal breath sounds  Abdominal:      Palpations: Abdomen is soft  Tenderness: There is no abdominal tenderness  Musculoskeletal:         General: No swelling  Cervical back: Neck supple  Skin:     General: Skin is warm and dry  Capillary Refill: Capillary refill takes less than 2 seconds  Neurological:      Mental Status: She is alert  Psychiatric:         Mood and Affect: Mood normal          Additional Data:     Labs: I have reviewed pertinent results     Results from last 7 days   Lab Units 12/28/22  0543   WBC Thousand/uL 5 87   HEMOGLOBIN g/dL 10 9*   HEMATOCRIT % 33 4*   PLATELETS Thousands/uL 187   NEUTROS PCT % 72   LYMPHS PCT % 17   MONOS PCT % 8   EOS PCT % 2     Results from last 7 days   Lab Units 12/28/22  0543 12/24/22  0509 12/23/22  1553   SODIUM mmol/L 136   < > 140   POTASSIUM mmol/L 3 8   < > 3 6   CHLORIDE mmol/L 103   < > 102   CO2 mmol/L 26   < > 26   BUN mg/dL 22   < > 15   CREATININE mg/dL 0 97   < > 0 87   ANION GAP mmol/L 7   < > 12   CALCIUM mg/dL 9 1   < > 10 0   ALBUMIN g/dL  --   --  4 5   TOTAL BILIRUBIN mg/dL  --   --  1 00   ALK PHOS U/L  --   --  62   ALT U/L  --   --  17   AST U/L  --   --  28   GLUCOSE RANDOM mg/dL 98   < > 112    < > = values in this interval not displayed           Results from last 7 days   Lab Units 12/28/22  0008 12/24/22  1057   POC GLUCOSE mg/dl 89 100         Results from last 7 days   Lab Units 12/25/22  0516 12/24/22  0509 12/23/22  2044   PROCALCITONIN ng/ml 0 06 0 06 0 05         Imaging: I have reviewed pertinent imaging       Recent Cultures (last 7 days):           Last 24 Hours Medication List:   Current Facility-Administered Medications   Medication Dose Route Frequency Provider Last Rate   • acetaminophen  650 mg Oral Q6H PRN [place] Aj, DO     • amLODIPine  5 mg Oral Daily [address] [facility] [facility], DO     • aspirin  81 mg Oral Daily [address] [facility], DO     • busPIRone  5 mg Oral BID [facility] Aj, DO     • clonazePAM  0 25 mg Oral BID PRN [facility], DO     • enoxaparin  30 mg Subcutaneous Q24H [address] Agustín Portillo DO     • hydrALAZINE  5 mg Intravenous Q6H PRN [facility], DO          Today, Patient Was Seen By: Agustín Portillo DO    ** Please Note: Dictation voice to text software may have been used in the creation of this document   **

## 2022-12-29 VITALS
DIASTOLIC BLOOD PRESSURE: 93 MMHG | HEART RATE: 87 BPM | SYSTOLIC BLOOD PRESSURE: 168 MMHG | TEMPERATURE: 97.8 F | BODY MASS INDEX: 21.29 KG/M2 | HEIGHT: 59 IN | OXYGEN SATURATION: 92 % | RESPIRATION RATE: 20 BRPM | WEIGHT: 105.6 LBS

## 2022-12-29 LAB
ANION GAP SERPL CALCULATED.3IONS-SCNC: 10 MMOL/L (ref 4–13)
BASOPHILS # BLD AUTO: 0.05 THOUSANDS/ÂΜL (ref 0–0.1)
BASOPHILS NFR BLD AUTO: 1 % (ref 0–1)
BUN SERPL-MCNC: 20 MG/DL (ref 5–25)
CALCIUM SERPL-MCNC: 9.2 MG/DL (ref 8.4–10.2)
CHLORIDE SERPL-SCNC: 103 MMOL/L (ref 96–108)
CO2 SERPL-SCNC: 25 MMOL/L (ref 21–32)
CREAT SERPL-MCNC: 0.83 MG/DL (ref 0.6–1.3)
EOSINOPHIL # BLD AUTO: 0.15 THOUSAND/ÂΜL (ref 0–0.61)
EOSINOPHIL NFR BLD AUTO: 3 % (ref 0–6)
ERYTHROCYTE [DISTWIDTH] IN BLOOD BY AUTOMATED COUNT: 13.2 % (ref 11.6–15.1)
GFR SERPL CREATININE-BSD FRML MDRD: 62 ML/MIN/1.73SQ M
GLUCOSE SERPL-MCNC: 99 MG/DL (ref 65–140)
HCT VFR BLD AUTO: 34.3 % (ref 34.8–46.1)
HGB BLD-MCNC: 11.1 G/DL (ref 11.5–15.4)
IMM GRANULOCYTES # BLD AUTO: 0.03 THOUSAND/UL (ref 0–0.2)
IMM GRANULOCYTES NFR BLD AUTO: 1 % (ref 0–2)
LYMPHOCYTES # BLD AUTO: 1.01 THOUSANDS/ÂΜL (ref 0.6–4.47)
LYMPHOCYTES NFR BLD AUTO: 17 % (ref 14–44)
MAGNESIUM SERPL-MCNC: 2 MG/DL (ref 1.9–2.7)
MCH RBC QN AUTO: 31.2 PG (ref 26.8–34.3)
MCHC RBC AUTO-ENTMCNC: 32.4 G/DL (ref 31.4–37.4)
MCV RBC AUTO: 96 FL (ref 82–98)
MONOCYTES # BLD AUTO: 0.52 THOUSAND/ÂΜL (ref 0.17–1.22)
MONOCYTES NFR BLD AUTO: 9 % (ref 4–12)
NEUTROPHILS # BLD AUTO: 4.04 THOUSANDS/ÂΜL (ref 1.85–7.62)
NEUTS SEG NFR BLD AUTO: 69 % (ref 43–75)
NRBC BLD AUTO-RTO: 0 /100 WBCS
PHOSPHATE SERPL-MCNC: 3.4 MG/DL (ref 2.3–4.1)
PLATELET # BLD AUTO: 186 THOUSANDS/UL (ref 149–390)
PMV BLD AUTO: 11.4 FL (ref 8.9–12.7)
POTASSIUM SERPL-SCNC: 3.6 MMOL/L (ref 3.5–5.3)
RBC # BLD AUTO: 3.56 MILLION/UL (ref 3.81–5.12)
SODIUM SERPL-SCNC: 138 MMOL/L (ref 135–147)
WBC # BLD AUTO: 5.8 THOUSAND/UL (ref 4.31–10.16)

## 2022-12-29 RX ORDER — AMLODIPINE BESYLATE 5 MG/1
5 TABLET ORAL DAILY
Refills: 0
Start: 2022-12-29

## 2022-12-29 RX ADMIN — BUSPIRONE HYDROCHLORIDE 5 MG: 5 TABLET ORAL at 08:18

## 2022-12-29 RX ADMIN — ASPIRIN 81 MG: 81 TABLET, COATED ORAL at 08:18

## 2022-12-29 RX ADMIN — AMLODIPINE BESYLATE 5 MG: 5 TABLET ORAL at 08:17

## 2022-12-29 NOTE — DISCHARGE SUMMARY
Ana Laurabryan 45  Discharge- Marie Phoenix 7/30/1933, 80 y o  female MRN: [id number]  Unit/Bed#: -01 Encounter: [id number]  Primary Care Provider: Cristin Warren MD   Date and time admitted to hospital: 12/23/2022  3:35 PM    * Acute encephalopathy  Assessment & Plan  · Acute toxic metabolic encephalopathy  · Resolved  · Likely secondary to COVID-19 viral pneumonia and sedating medications which have been since reduced  · AAOx4  · CT head with finding of meningioma which has been seen on previous imaging  · Possibly secondary to medications versus acute viral illness  · Will continue supportive care  · Home Klonopin dose reduced  · Nonfocal neuro exam at this time    Frequent falls  Assessment & Plan  · Per family at bedside patient has had multiple falls over the last few weeks  · PT/OT eval recommending rehab  · Case management for discharge planning  · Patient is medically stable for discharge to short-term rehab    Hypertensive urgency  Assessment & Plan  · Resolved  · BP significantly elevated in the ER with SBP greater than 200 mmHg  · Pressure has gradually improved  · Continue Norvasc 5 mg daily  · Monitor BP trends while admitted and adjust medications as indicated    COVID-19  Assessment & Plan  · Son is also COVID positive and admitted to the hospital  · COVID + 12/23/2022  · CXR (12/23): No acute cardiopulmonary disease    · As she is not requiring supplemental oxygen, will not begin Remdesivir or Decadron  · Will continue supportive care    Stage 3b chronic kidney disease [facility])  Assessment & Plan  Lab Results   Component Value Date    EGFR 51 12/28/2022    EGFR 55 12/25/2022    EGFR 67 12/24/2022    CREATININE 0 97 12/28/2022    CREATININE 0 92 12/25/2022    CREATININE 0 78 12/24/2022   · Creatinine at baseline  · Will continue routine lab monitoring    [facility])  Assessment & Plan  · This is a previously known finding  · CT imaging results appreciated    Hyperthyroidism  Assessment & Plan  · TSH 0 448, T4 free within normal limits    Anxiety  Assessment & Plan  · Continue home Buspar and Klonopin (at reduced dose)      Discharging Physician / Practitioner: Emeka Trivedi DO  PCP: Peg Castellon MD  Admission Date:   Admission Orders (From admission, onward)     Ordered        12/23/22 1815  INPATIENT ADMISSION  Once                      Discharge Date: 12/29/22    Medical Problems     Resolved Problems  Date Reviewed: 12/29/2022   None         Consultations During Hospital Stay: PT/OT    Procedures Performed: Not applicable    Significant Findings / Test Results:     XR chest portable    Result Date: 12/23/2022  Impression: No acute cardiopulmonary disease  Workstation performed: [id number]     CT head wo contrast    Result Date: 12/23/2022  Impression: No intracranial hemorrhage or calvarial fracture  2 2 x 1 8 cm extra-axial soft tissue mass with calcifications in the right temporal region abutting the calvarium  Based on appearance this likely represents a meningioma  No significant mass effect or midline shift  Mild chronic microvascular ischemic changes  The study was marked in [facility] for immediate notification  Workstation performed: [id number]       Incidental Findings: 2 3 x 1 8 cm extra-axial soft tissue mass with calcifications in the right temporal region abutting the calvarium  Based on appearance this likely represents a meningioma  Test Results Pending at Discharge (will require follow up): Not applicable     Outpatient Tests Requested: Not applicable    Reason for Admission: Encephalopathy    Hospital Course:     An Sevilla is a 80 y o  female with a PMH of anxiety who presents with altered mental status  Per Critical care discussion with family, patient with increased confusion at home over past few days and has had falls  EMS called this morning and patient declined transfer as they felt patient was oriented   Daughter in law came from seeing her  (patient's son) and when she returned home found patient down again and EMS called and brought in to ED for evaluation  Son is POA, he is in ICU with Covid, son had symptoms for about 2 weeks       Patient reports she had a fall last week, yesterday was not able to get out of bed, today she had a fall and did not want to go to hospital and then had another fall and came to ED  Notes weakness, reports eating OK  The patient remained hemodynamically stable and saturating well on room air throughout her hospital course  PT/OT recommended short-term rehab  Case management arranged for admission to short-term rehab  The patient was discharged in stable condition on 12/29/2022  She was strongly encouraged to follow-up with her PCP within 7-14 days  Condition at Discharge: stable     Discharge Day Visit / Exam:     Subjective: Patient seen and examined at bedside  No acute events overnight  Denies chest pain, SOB, diaphoresis, nausea/vomiting/diarrhea, fevers/chills  Vitals: Blood Pressure: 168/93 (12/29/22 0704)  Pulse: 87 (12/28/22 2204)  Temperature: 97 8 °F (36 6 °C) (12/28/22 2204)  Temp Source: Temporal (12/28/22 2204)  Respirations: 20 (12/29/22 0704)  Height: 4' 11" (149 9 cm) (12/23/22 2142)  Weight - Scale: 47 9 kg (105 lb 9 6 oz) (12/23/22 2142)  SpO2: 92 % (12/28/22 2204)     Exam:   Physical Exam  Vitals and nursing note reviewed  Constitutional:       General: She is not in acute distress  Appearance: She is well-developed  HENT:      Head: Normocephalic and atraumatic  Eyes:      Conjunctiva/sclera: Conjunctivae normal    Cardiovascular:      Rate and Rhythm: Normal rate and regular rhythm  Heart sounds: No murmur heard  Pulmonary:      Effort: Pulmonary effort is normal  No respiratory distress  Breath sounds: Normal breath sounds  Abdominal:      Palpations: Abdomen is soft  Tenderness: There is no abdominal tenderness  Musculoskeletal:         General: No swelling  Cervical back: Neck supple  Skin:     General: Skin is warm and dry  Capillary Refill: Capillary refill takes less than 2 seconds  Neurological:      Mental Status: She is alert  Psychiatric:         Mood and Affect: Mood normal            Discharge instructions/Information to patient and family:   See after visit summary for information provided to patient and family  Provisions for Follow-Up Care:  See after visit summary for information related to follow-up care and any pertinent home health orders  Disposition:     Short-term rehab  Outpatient follow-up with PCP  Resume preadmission medications     Discharge Statement:  I spent 60 minutes discharging the patient  This time was spent on the day of discharge  I had direct contact with the patient on the day of discharge  Greater than 50% of the total time was spent examining patient, answering all patient questions, arranging and discussing plan of care with patient as well as directly providing post-discharge instructions  Additional time then spent on discharge activities  Discharge Medications:  See after visit summary for reconciled discharge medications provided to patient and family        ** Please Note: This note has been constructed using a voice recognition system **

## 2022-12-29 NOTE — NURSING NOTE
Patient discharged in stable condition, report called to Aaron at [address] [facility]

## 2022-12-29 NOTE — PLAN OF CARE
Problem: Potential for Falls  Goal: Patient will remain free of falls  Description: INTERVENTIONS:  - Educate patient/family on patient safety including physical limitations  - Instruct patient to call for assistance with activity   - Consult OT/PT to assist with strengthening/mobility   - Keep Call bell within reach  - Keep bed low and locked with side rails adjusted as appropriate  - Keep care items and personal belongings within reach  - Initiate and maintain comfort rounds  - Make Fall Risk Sign visible to staff  - Offer Toileting every 2 Hours, in advance of need  - Initiate/Maintain bed alarm  - Obtain necessary fall risk management equipment  - Apply yellow socks and bracelet for high fall risk patients  - Consider moving patient to room near nurses station  Outcome: Adequate for Discharge     Problem: Prexisting or High Potential for Compromised Skin Integrity  Goal: Skin integrity is maintained or improved  Description: INTERVENTIONS:  - Identify patients at risk for skin breakdown  - Assess and monitor skin integrity  - Assess and monitor nutrition and hydration status  - Monitor labs   - Assess for incontinence   - Turn and reposition patient  - Assist with mobility/ambulation  - Relieve pressure over bony prominences  - Avoid friction and shearing  - Provide appropriate hygiene as needed including keeping skin clean and dry  - Evaluate need for skin moisturizer/barrier cream  - Collaborate with interdisciplinary team   - Patient/family teaching  - Consider wound care consult   Outcome: Adequate for Discharge     Problem: MOBILITY - ADULT  Goal: Maintain or return to baseline ADL function  Description: INTERVENTIONS:  -  Assess patient's ability to carry out ADLs; assess patient's baseline for ADL function and identify physical deficits which impact ability to perform ADLs (bathing, care of mouth/teeth, toileting, grooming, dressing, etc )  - Assess/evaluate cause of self-care deficits   - Assess range of motion  - Assess patient's mobility; develop plan if impaired  - Assess patient's need for assistive devices and provide as appropriate  - Encourage maximum independence but intervene and supervise when necessary  - Involve family in performance of ADLs  - Assess for home care needs following discharge   - Consider OT consult to assist with ADL evaluation and planning for discharge  - Provide patient education as appropriate  Outcome: Adequate for Discharge  Goal: Maintains/Returns to pre admission functional level  Description: INTERVENTIONS:  - Perform BMAT or MOVE assessment daily    - Set and communicate daily mobility goal to care team and patient/family/caregiver     - Collaborate with rehabilitation services on mobility goals if consulted  - Out of bed for toileting  - Record patient progress and toleration of activity level   Outcome: Adequate for Discharge     Problem: GENITOURINARY - ADULT  Goal: Maintains or returns to baseline urinary function  Description: INTERVENTIONS:  - Assess urinary function  - Encourage oral fluids to ensure adequate hydration if ordered  - Administer IV fluids as ordered to ensure adequate hydration  - Administer ordered medications as needed  - Offer frequent toileting  - Follow urinary retention protocol if ordered  Outcome: Adequate for Discharge  Goal: Absence of urinary retention  Description: INTERVENTIONS:  - Assess patient’s ability to void and empty bladder  - Monitor I/O  - Bladder scan as needed  - Discuss with physician/AP medications to alleviate retention as needed  - Discuss catheterization for long term situations as appropriate  Outcome: Adequate for Discharge     Problem: PAIN - ADULT  Goal: Verbalizes/displays adequate comfort level or baseline comfort level  Description: Interventions:  - Encourage patient to monitor pain and request assistance  - Assess pain using appropriate pain scale  - Administer analgesics based on type and severity of pain and evaluate response  - Implement non-pharmacological measures as appropriate and evaluate response  - Consider cultural and social influences on pain and pain management  - Notify physician/advanced practitioner if interventions unsuccessful or patient reports new pain  Outcome: Adequate for Discharge     Problem: INFECTION - ADULT  Goal: Absence or prevention of progression during hospitalization  Description: INTERVENTIONS:  - Assess and monitor for signs and symptoms of infection  - Monitor lab/diagnostic results  - Monitor all insertion sites, i e  indwelling lines, tubes, and drains  - Monitor endotracheal if appropriate and nasal secretions for changes in amount and color  - [place] appropriate cooling/warming therapies per order  - Administer medications as ordered  - Instruct and encourage patient and family to use good hand hygiene technique  - Identify and instruct in appropriate isolation precautions for identified infection/condition  Outcome: Adequate for Discharge  Goal: Absence of fever/infection during neutropenic period  Description: INTERVENTIONS:  - Monitor WBC    Outcome: Adequate for Discharge     Problem: SAFETY ADULT  Goal: Patient will remain free of falls  Description: INTERVENTIONS:  - Educate patient/family on patient safety including physical limitations  - Instruct patient to call for assistance with activity   - Consult OT/PT to assist with strengthening/mobility   - Keep Call bell within reach  - Keep bed low and locked with side rails adjusted as appropriate  - Keep care items and personal belongings within reach  - Initiate and maintain comfort rounds  - Make Fall Risk Sign visible to staff  - Offer Toileting every 2 Hours, in advance of need  - Initiate/Maintain bed alarm  - Obtain necessary fall risk management equipment  - Apply yellow socks and bracelet for high fall risk patients  - Consider moving patient to room near nurses station  Outcome: Adequate for Discharge  Goal: Maintain or return to baseline ADL function  Description: INTERVENTIONS:  -  Assess patient's ability to carry out ADLs; assess patient's baseline for ADL function and identify physical deficits which impact ability to perform ADLs (bathing, care of mouth/teeth, toileting, grooming, dressing, etc )  - Assess/evaluate cause of self-care deficits   - Assess range of motion  - Assess patient's mobility; develop plan if impaired  - Assess patient's need for assistive devices and provide as appropriate  - Encourage maximum independence but intervene and supervise when necessary  - Involve family in performance of ADLs  - Assess for home care needs following discharge   - Consider OT consult to assist with ADL evaluation and planning for discharge  - Provide patient education as appropriate  Outcome: Adequate for Discharge  Goal: Maintains/Returns to pre admission functional level  Description: INTERVENTIONS:  - Perform BMAT or MOVE assessment daily    - Set and communicate daily mobility goal to care team and patient/family/caregiver     - Collaborate with rehabilitation services on mobility goals if consulted  - Out of bed for toileting  - Record patient progress and toleration of activity level   Outcome: Adequate for Discharge     Problem: DISCHARGE PLANNING  Goal: Discharge to home or other facility with appropriate resources  Description: INTERVENTIONS:  - Identify barriers to discharge w/patient and caregiver  - Arrange for needed discharge resources and transportation as appropriate  - Identify discharge learning needs (meds, wound care, etc )  - Arrange for interpretive services to assist at discharge as needed  - Refer to Case Management Department for coordinating discharge planning if the patient needs post-hospital services based on physician/advanced practitioner order or complex needs related to functional status, cognitive ability, or social support system  Outcome: Adequate for Discharge     Problem: Knowledge Deficit  Goal: Patient/family/caregiver demonstrates understanding of disease process, treatment plan, medications, and discharge instructions  Description: Complete learning assessment and assess knowledge base    Interventions:  - Provide teaching at level of understanding  - Provide teaching via preferred learning methods  Outcome: Adequate for Discharge

## 2022-12-29 NOTE — INCIDENTAL FINDINGS
The following findings require follow up:  Radiographic finding   Findin 2 x 1 8 cm extra-axial soft tissue mass with calcifications in the right temporal region abutting the calvarium  Based on appearance this likely represents a meningioma  No significant mass effect or midline shift     Follow up required: CT head   Follow up should be done within 6 month(s)    Please notify the following clinician to assist with the follow up:   PCP

## 2022-12-30 ENCOUNTER — NURSING HOME VISIT (OUTPATIENT)
Dept: GERIATRICS | Facility: OTHER | Age: 87
End: 2022-12-30

## 2022-12-30 VITALS
OXYGEN SATURATION: 98 % | HEART RATE: 80 BPM | BODY MASS INDEX: 21.69 KG/M2 | DIASTOLIC BLOOD PRESSURE: 72 MMHG | RESPIRATION RATE: 18 BRPM | WEIGHT: 107.4 LBS | SYSTOLIC BLOOD PRESSURE: 133 MMHG | TEMPERATURE: 97.2 F

## 2022-12-30 DIAGNOSIS — G93.40 ACUTE ENCEPHALOPATHY: Primary | ICD-10-CM

## 2022-12-30 NOTE — PROGRESS NOTES
[facility] [address], [address] [place], [place] [id number]    Nursing Home Admission    NAME: Gus Rose  AGE: 80 y o  SEX: female [id number]      Patient Location     [facility] rehab    Patient’s care was coordinated with nursing facility staff  Recent vitals, labs and updated medications were reviewed on [facility]  Past Medical, surgical, social, medication and allergy history and patient’s previous records reviewed  Assessment/Plan:    Acute encephalopathy  • Patient was recently hospitalized with acute toxic metabolic encephalopathy attributed  to COVID-19 viral pneumonia and sedating medications  • Klonopin dose was reduced  • CT head revealed  finding of meningioma which has been seen on previous imaging  • Continue  supportive care  • Continue to monitor    Recurrent falls:  History of recurrent falls  Continue PT OT    Hypertensive urgency:   Blood pressure was noted to be high in 200s in ER  Currently stable on amlodipine 5 mg daily  We will continue to monitor    COVID-19:  Patient tested positive for COVID-19 on 1223  Chest x-ray was negative for acute cardiopulmonary disease  Patient remained asymptomatic, did not require any definitive treatment of supplemental oxygen  Continue supportive care  CKD 3:  Recent creatinine was stable  Avoid hypotension and nephrotoxins    Meningioma:  Patient has known history of meningioma  Recent CT head revealed unchanged findings of meningioma  Follow-up with neurology service    Hyperthyroidism:  Recent TSH  and freeT4 were within normal limits as well    Patient is currently not on any therapy for above    Anxiety:  Continue Buspirone and Clonazepam     Chief Complaint     Recent hospitalization followed acute metabolic encephalopathy attributed to COVID-19 infection and meds    HPI       Patient is a 80 y o  female past medical history significant for hypertension, hyperlipidemia, hypothyroidism, CKD 3, meningioma, arthritis and anxiety  She was hospitalized on 12/23/2022 for altered mental status falls at home  Pt tested positive for COVID-19  Of note patient's son was hospitalized with COVID-19 infection  CT head revealed unchanged findings of meningioma  Chest x-ray was negative for acute cardiopulmonary disease  Patient remained asymptomatic with respect to COVID-19  Supportive care was provided  Patient did not require any supplemental oxygen  Altered mental status was attributed to metabolic encephalopathy likely due to COVID-19 viral pneumonia as well as sedating meds  Home dose of Klonopin was reduced  Patient was seen by PT OT services for frequent falls  She was subsequently discharged to [facility] rehab where she is being seen for posthospital admission  At the time of my evaluation patient is doing okay  Of note blood pressure was noted to be high in 200s in the emergency room, subsequently improved      Past Medical History:   Diagnosis Date   • Anxiety    • Arthritis    • Hyperlipidemia    • Hypertension    • Hyperthyroidism        Past Surgical History:   Procedure Laterality Date   • ABDOMINAL SURGERY      peritoneal    • APPENDECTOMY     • HYSTERECTOMY         Social History     Tobacco Use   Smoking Status Former   • Years: 15 00   • Types: Cigarettes   Smokeless Tobacco Never          Family History   Problem Relation Age of Onset   • Heart disease Father    • Pulmonary embolism Father    • Dementia Sister    • Lung cancer Other    • Diabetes Mother         No Known Allergies       Current Outpatient Medications:   •  amLODIPine (NORVASC) 5 mg tablet, Take 1 tablet (5 mg total) by mouth daily, Disp: , Rfl: 0  •  aspirin 81 MG tablet, Take 1 tablet by mouth daily, Disp: , Rfl:   •  busPIRone (BUSPAR) 5 mg tablet, Take 1 tablet (5 mg total) by mouth 2 (two) times a day, Disp: 60 tablet, Rfl: 5  •  clonazePAM (KlonoPIN) 1 mg tablet, 1/2 po tid prn anxiety, Disp: 45 tablet, Rfl: 1  No current facility-administered medications for this visit  Updated list was reviewed in [facility] [address] [facility] system of facility  Vitals:    12/30/22 0958   BP: 133/72   Pulse: 80   Resp: 18   Temp: (!) 97 2 °F (36 2 °C)   SpO2: 98%       Vital signs were reviewed in point click care    Review of Systems   Constitutional: Positive for fatigue  Negative for chills and fever  HENT: Negative for nosebleeds and rhinorrhea  Eyes: Negative for discharge and redness  Respiratory: Positive for cough  Negative for chest tightness, shortness of breath, wheezing and stridor  Cardiovascular: Negative for chest pain and leg swelling  Gastrointestinal: Negative for abdominal distention, abdominal pain, diarrhea and vomiting  Genitourinary: Negative for dysuria, flank pain and hematuria  Musculoskeletal: Positive for gait problem  Negative for arthralgias and back pain  Skin: Negative for pallor  Neurological: Positive for weakness (Generalized)  Negative for tremors, seizures, syncope and headaches  Psychiatric/Behavioral: Negative for agitation, behavioral problems and confusion  Physical Exam  Constitutional:       General: She is not in acute distress  HENT:      Head: Normocephalic and atraumatic  Nose: No rhinorrhea  Eyes:      General: No scleral icterus  Right eye: No discharge  Left eye: No discharge  Cardiovascular:      Rate and Rhythm: Normal rate and regular rhythm  Pulmonary:      Breath sounds: No wheezing, rhonchi or rales  Abdominal:      General: There is no distension  Palpations: Abdomen is soft  Tenderness: There is no abdominal tenderness  There is no guarding  Genitourinary:     Comments: Alvarado catheter in place  Musculoskeletal:      Cervical back: Neck supple  Right lower leg: No edema  Left lower leg: No edema  Skin:     Coloration: Skin is not jaundiced        Findings: Bruising (echhymosis LUE) present  Neurological:      General: No focal deficit present  Mental Status: Mental status is at baseline  Cranial Nerves: No cranial nerve deficit  Comments: Oriented in year, knows the current president  Does not remember the month   Psychiatric:         Mood and Affect: Mood normal          Behavior: Behavior normal            Diagnostic Data       Recent labs and imaging studies were reviewed  Lab Results   Component Value Date    SODIUM 138 12/29/2022    K 3 6 12/29/2022     12/29/2022    CO2 25 12/29/2022    BUN 20 12/29/2022    CREATININE 0 83 12/29/2022    GLUC 99 12/29/2022    CALCIUM 9 2 12/29/2022      Lab Results   Component Value Date    WBC 5 80 12/29/2022    HGB 11 1 (L) 12/29/2022    HCT 34 3 (L) 12/29/2022    MCV 96 12/29/2022     12/29/2022       Code Status:      Full code    Additional notes:             This note was electronically signed by Dr Darrius Cleaning

## 2022-12-30 NOTE — UTILIZATION REVIEW
Notification of Discharge   This is a Notification of Discharge from our facility [address]  Please be advised that this patient has been discharge from our facility  Below you will find the admission and discharge date and time including the patient’s disposition  UTILIZATION REVIEW CONTACT:  Madonna Cuellar  Utilization   Network Utilization Review Department  Phone: [phone] carefully listen to the prompts  All voicemails are confidential   Email: [email or web address]     PHYSICIAN ADVISORY SERVICES:  FOR QKZZ-PI-HCPJ REVIEW - MEDICAL NECESSITY DENIAL  Phone: [phone]  Fax: [phone]  Email: [email or web address]     PRESENTATION DATE: 12/23/2022  3:35 PM  OBERVATION ADMISSION DATE:   INPATIENT ADMISSION DATE: 12/23/22  6:15 PM   DISCHARGE DATE: 12/29/2022  9:44 AM  DISPOSITION: Home/Self Care Home/Self Care      IMPORTANT INFORMATION:  Send all requests for admission clinical reviews, approved or denied determinations and any other requests to dedicated fax number below belonging to the campus where the patient is receiving treatment   List of dedicated fax numbers:  [address] [place] [address] DENIALS (Administrative/Medical Necessity) [phone]   [address] [place] [address] (Maternity/NICU/Pediatrics) [phone]   [facility] [phone]   [address] [phone]   [address] [phone]   [address] [facility] [phone]   [address] [facility] [address] [phone]   [address] [place] [address] 119 [phone]   [facility]  [phone]   [address] [facility] [phone]   [facility] [address] [facility] [phone]

## 2022-12-30 NOTE — ASSESSMENT & PLAN NOTE
• Patient was recently hospitalized with acute toxic metabolic encephalopathy attributed  to COVID-19 viral pneumonia and sedating medications    • Klonopin dose was reduced  • CT head revealed  finding of meningioma which has been seen on previous imaging  • Continue  supportive care  • Continue to monitor

## 2023-01-04 ENCOUNTER — NURSING HOME VISIT (OUTPATIENT)
Dept: GERIATRICS | Facility: OTHER | Age: 88
End: 2023-01-04

## 2023-01-04 VITALS
SYSTOLIC BLOOD PRESSURE: 136 MMHG | TEMPERATURE: 97.9 F | DIASTOLIC BLOOD PRESSURE: 72 MMHG | OXYGEN SATURATION: 96 % | HEART RATE: 74 BPM | WEIGHT: 107.4 LBS | HEIGHT: 60 IN | BODY MASS INDEX: 21.09 KG/M2 | RESPIRATION RATE: 18 BRPM

## 2023-01-04 DIAGNOSIS — N18.32 STAGE 3B CHRONIC KIDNEY DISEASE (HCC): Chronic | ICD-10-CM

## 2023-01-04 DIAGNOSIS — U07.1 COVID-19: ICD-10-CM

## 2023-01-04 DIAGNOSIS — D32.9 MENINGIOMA (HCC): Chronic | ICD-10-CM

## 2023-01-04 DIAGNOSIS — G93.40 ACUTE ENCEPHALOPATHY: Primary | ICD-10-CM

## 2023-01-04 DIAGNOSIS — R29.6 FREQUENT FALLS: ICD-10-CM

## 2023-01-04 PROBLEM — Z97.8 FOLEY CATHETER IN PLACE: Status: ACTIVE | Noted: 2023-01-04

## 2023-01-04 NOTE — ASSESSMENT & PLAN NOTE
Patient was discharged with fole y cath  No previous history of urina  ry retention    - voiding trial

## 2023-01-04 NOTE — ASSESSMENT & PLAN NOTE
Lab Results   Component Value Date    EGFR 62 12/29/2022    EGFR 51 12/28/2022    EGFR 55 12/25/2022    CREATININE 0 83 12/29/2022    CREATININE 0 97 12/28/2022    CREATININE 0 92 12/25/2022   stable  - avoid nephrotoxic medications  -PCP Dr Regina Kapoor

## 2023-01-04 NOTE — PROGRESS NOTES
[facility]  [address] [place] [address]    Nursing Home Progress Note    NAME: Inés Lind  AGE: 80 y o  SEX: female [id number]      Patient Location     [address] [place] [address] rehab    Patient’s care was coordinated with nursing facility staff  Recent vitals, labs and updated medications were reviewed on [email or web address]  Past Medical, surgical, social, medication and allergy history and patient’s previous records reviewed  Assessment/Plan:    Acute encephalopathy    • Resolved AAO*2  • Patient was recently hospitalized 12/23-12/29 with acute toxic metabolic encephalopathy attributed  to COVID-19 viral pneumonia and sedating medications  • Klonopin dose was reduced  • CT head revealed  finding of meningioma which has been seen on previous imaging  Mild chronic microangiovascular changes  • Also known hx of demyelinating disease      • Continue  supportive care, reorientation  • Continue to monitor    COVID-19  Accidentally found during recent admission, could have contributed to patient mental status chnage    Frequent falls  -continue PT/OT    Stage 3b chronic kidney disease ([facility] [address] )  Lab Results   Component Value Date    EGFR 62 12/29/2022    EGFR 51 12/28/2022    EGFR 55 12/25/2022    CREATININE 0 83 12/29/2022    CREATININE 0 97 12/28/2022    CREATININE 0 92 12/25/2022   stable  - avoid nephrotoxic medications  -PCP Dr Sarai Major [facility])  Known finding since 2013    Alvarado catheter in place  Patient was discharged with fole y cath  No previous history of urina  ry retention    - voiding trial          Chief Complaint     Patient is awake alert  Oriented *2  She denies any complains  Alvarado is in place  Oxygen applied for comfort        Patient is a 80 y o  female       Past Medical History:   Diagnosis Date   • Anxiety    • Arthritis    • Hyperlipidemia    • Hypertension    • Hyperthyroidism        Past Surgical History:   Procedure Laterality Date   • ABDOMINAL SURGERY      peritoneal    • APPENDECTOMY     • HYSTERECTOMY         Social History     Tobacco Use   Smoking Status Former   • Years: 15 00   • Types: Cigarettes   Smokeless Tobacco Never          Family History   Problem Relation Age of Onset   • Heart disease Father    • Pulmonary embolism Father    • Dementia Sister    • Lung cancer Other    • Diabetes Mother         No Known Allergies       Current Outpatient Medications:   •  amLODIPine (NORVASC) 5 mg tablet, Take 1 tablet (5 mg total) by mouth daily, Disp: , Rfl: 0  •  aspirin 81 MG tablet, Take 1 tablet by mouth daily, Disp: , Rfl:   •  busPIRone (BUSPAR) 5 mg tablet, Take 1 tablet (5 mg total) by mouth 2 (two) times a day, Disp: 60 tablet, Rfl: 5  •  clonazePAM (KlonoPIN) 1 mg tablet, 1/2 po tid prn anxiety, Disp: 45 tablet, Rfl: 1    Updated list was reviewed in [facility] system of facility  Vitals:    01/04/23 1408   BP: 136/72   Pulse: 74   Resp: 18   Temp: 97 9 °F (36 6 °C)   SpO2: 96%       Vital signs were reviewed in point click care    Review of Systems   Unable to perform ROS: Mental status change   Constitutional: Negative  Negative for chills, fatigue and fever  HENT: Negative  Negative for postnasal drip  Eyes: Negative  Respiratory: Negative for cough, chest tightness, shortness of breath and wheezing  Cardiovascular: Negative for chest pain, palpitations and leg swelling  Gastrointestinal: Negative for abdominal pain, blood in stool, constipation, diarrhea and nausea  Endocrine: Negative  Genitourinary: Negative for dysuria, flank pain and hematuria  Skin: Negative for pallor and rash  Allergic/Immunologic: Negative  Neurological: Negative for dizziness, numbness and headaches  Hematological: Negative  Physical Exam  Constitutional:       General: She is not in acute distress  Appearance: She is ill-appearing  She is not toxic-appearing     HENT:      Head: Atraumatic  Cardiovascular:      Rate and Rhythm: Normal rate and regular rhythm  Pulses: Normal pulses  Heart sounds: No murmur heard  No friction rub  No gallop  Pulmonary:      Effort: No respiratory distress  Breath sounds: No wheezing, rhonchi or rales  Abdominal:      General: Bowel sounds are normal  There is no distension  Tenderness: There is no abdominal tenderness  Musculoskeletal:      Right lower leg: No edema  Left lower leg: No edema  Comments: kyphosis   Skin:     Findings: No lesion or rash  Neurological:      Comments: Oriented *2   Psychiatric:         Mood and Affect: Mood normal            Diagnostic Data       Recent labs and imaging studies were reviewed  Code Status: Additional notes:             This note was electronically signed by Dr Kim Ratliff

## 2023-01-04 NOTE — ASSESSMENT & PLAN NOTE
• Resolved AAO*2  • Patient was recently hospitalized 12/23-12/29 with acute toxic metabolic encephalopathy attributed  to COVID-19 viral pneumonia and sedating medications  • Klonopin dose was reduced  • CT head revealed  finding of meningioma which has been seen on previous imaging   Mild chronic microangiovascular changes  • Also known hx of demyelinating disease      • Continue  supportive care, reorientation  • Continue to monitor

## 2023-01-06 ENCOUNTER — NURSING HOME VISIT (OUTPATIENT)
Dept: GERIATRICS | Facility: OTHER | Age: 88
End: 2023-01-06

## 2023-01-06 VITALS
OXYGEN SATURATION: 98 % | RESPIRATION RATE: 18 BRPM | SYSTOLIC BLOOD PRESSURE: 130 MMHG | HEART RATE: 76 BPM | DIASTOLIC BLOOD PRESSURE: 70 MMHG | TEMPERATURE: 97.8 F

## 2023-01-06 DIAGNOSIS — Z97.8 FOLEY CATHETER IN PLACE: ICD-10-CM

## 2023-01-06 DIAGNOSIS — I16.0 HYPERTENSIVE URGENCY: ICD-10-CM

## 2023-01-06 DIAGNOSIS — G47.00 INSOMNIA, UNSPECIFIED TYPE: ICD-10-CM

## 2023-01-06 DIAGNOSIS — U07.1 COVID-19: Primary | ICD-10-CM

## 2023-01-06 DIAGNOSIS — M19.90 ARTHRITIS: ICD-10-CM

## 2023-01-06 DIAGNOSIS — G93.40 ACUTE ENCEPHALOPATHY: ICD-10-CM

## 2023-01-06 DIAGNOSIS — F41.9 ANXIETY: Chronic | ICD-10-CM

## 2023-01-06 NOTE — ASSESSMENT & PLAN NOTE
· Patient reports having trouble sleeping since being in rehab  · Patient states she normally takes melatonin at home  · Will order melatonin 3 mg nightly

## 2023-01-06 NOTE — ASSESSMENT & PLAN NOTE
· Patient tested positive for COVID during recent hospitalization  · Possible contributing factor for change in mental status  · Currently afebrile 97 8  · Patient currently on 2 L nasal cannula at 98%  · Patient reports home O2 use as needed  · Patient denies shortness of breath, cough, LSC  · Continue to monitor

## 2023-01-06 NOTE — PROGRESS NOTES
[facility]  [address]  [phone]  Aidan Bilis  Code 31 (STR)        NAME: Joanna Husain  AGE: 80 y o  SEX: female CODE STATUS: CPR    DATE OF ENCOUNTER: 1/6/2023    Assessment and Plan     1  COVID-19  Assessment & Plan:  · Patient tested positive for COVID during recent hospitalization  · Possible contributing factor for change in mental status  · Currently afebrile 97 8  · Patient currently on 2 L nasal cannula at 98%  · Patient reports home O2 use as needed  · Patient denies shortness of breath, cough, LSC  · Continue to monitor        2  Acute encephalopathy  Assessment & Plan:  · Patient recently hospitalized for altered mental status  · Found to be COVID-positive  · CT of head showed meningioma that was unchanged from prior study  · On exam today patient is alert and oriented x4  · During exam however, patient was grabbing for objects that were not there  · Continue clonazepam 0 5 mg every 8 hours as needed  ·  redirect and reorient as needed  · Continue 24/7 supportive care      3  Hypertensive urgency  Assessment & Plan:  · Patient was noted to be elevated during recent hospitalization, SBP greater than 200 mmHg  · Currently blood pressure has been stable 130/70  · Continue to monitor BP  · Continue amlodipine 5 mg daily      4  Anxiety  Assessment & Plan:  · Continue BuSpar 5 mg daily  · Continue clonazepam 0 5 mg every 8 hours as needed      5  Arthritis  Assessment & Plan:  · Patient complains of chronic joint pain from arthritis  · Patient states she was taking tramadol at home  · Will order tramadol 25 mg every 8 hours as needed  · Continue Tylenol 600 mg every 6 hours as needed pain      6  Insomnia, unspecified type  Assessment & Plan:  · Patient reports having trouble sleeping since being in rehab  · Patient states she normally takes melatonin at home  · Will order melatonin 3 mg nightly      7   Alvarado catheter in place  Assessment & Plan:  · Patient discharged from hospital with Alvarado catheter  · Draining clear yellow urine  · Will need voiding trial         All medications and routine orders were reviewed and updated as needed  Chief Complaint     STR follow up visit  Patient's care was coordinated with nursing facility staff  Recent vitals, labs, and updated medications were review on [facility] system in facility  Past Medical and Surgical History      Past Medical History:   Diagnosis Date   • Anxiety    • Arthritis    • Hyperlipidemia    • Hypertension    • Hyperthyroidism      Past Surgical History:   Procedure Laterality Date   • ABDOMINAL SURGERY      peritoneal    • APPENDECTOMY     • HYSTERECTOMY       No Known Allergies       History of Present Illness     MARIAN Kirkland is a 80year old female, she is a STR patient of [facility] since 12/29/22  Past Medical Hx including but not limited to HLD, HTN, hyperthyroidism, arthritis, anxiety, CKD  She was seen in collaboration with nursing for medical mgmt and STR follow up  Hospital Course  Patient was admitted to the hospital 12/23/22 for falls and increased confusion  Patient's son had Covid and was hospitalized  Patient tested positive for COVID on admission  CT of head showed meningioma that was unchanged from prior study  Chest x-ray was negative for acute cardiopulmonary disease  Altered mental status was thought to be from metabolic encephalopathy likely due to COVID pneumonia and sedating medications  Home dose of Klonopin was reduced  Patient was recommended for rehab  Rehab course   Neda Seats was seen and examined at bedside today  Patient is a reliable historian and is alert and oriented x4  However, one time during exam she was grasping for objects that were not there  Patient state her eyesight is poor  She denies any pain and says she has been sleeping well  Her participation in therapy has been limited as she is still on isolation precautions    Denies CP/SOB/N/V/D  Denies lightheadedness, dizziness, headaches, vision changes  Patient states they are eating well and staying hydrated  Denies any bowel or bladder issues  Per review of SNF records, Patient is eating 3 meals per day, consuming 50-75 %  Last documented BM 1/1/23  No concerns from nursing at this time  The patient's allergies, past medical, surgical, social and family history were reviewed and unchanged  Review of Systems     Review of Systems   Constitutional: Positive for activity change and fatigue  Negative for appetite change, chills and fever  HENT: Negative  Negative for congestion  Eyes:        Patient states she has very poor eyesight   Respiratory: Negative  Negative for cough, shortness of breath and wheezing  Cardiovascular: Negative  Negative for chest pain, palpitations and leg swelling  Gastrointestinal: Negative for abdominal distention, abdominal pain, constipation, diarrhea, nausea and vomiting  Endocrine: Negative  Genitourinary: Positive for difficulty urinating  Alvarado catheter intact   Musculoskeletal: Positive for arthralgias and gait problem  Chronic pain from arthritis   Skin: Negative  Allergic/Immunologic: Negative  Neurological: Positive for weakness  Negative for dizziness, light-headedness and headaches  Hematological: Negative  Psychiatric/Behavioral: Positive for hallucinations and sleep disturbance  Negative for behavioral problems and confusion  During exam patient grasped for objects that were not there         Objective     Vitals:   Vitals:    01/06/23 1051   BP: 130/70   Pulse: 76   Resp: 18   Temp: 97 8 °F (36 6 °C)   SpO2: 98%         Physical Exam  Vitals and nursing note reviewed  Constitutional:       General: She is not in acute distress  Appearance: Normal appearance  She is not ill-appearing  HENT:      Head: Normocephalic and atraumatic  Nose: No congestion        Mouth/Throat:      Mouth: Mucous membranes are moist    Eyes:      Conjunctiva/sclera: Conjunctivae normal    Cardiovascular:      Rate and Rhythm: Normal rate and regular rhythm  Pulses: Normal pulses  Heart sounds: Normal heart sounds  Pulmonary:      Effort: Pulmonary effort is normal       Breath sounds: Normal breath sounds  Abdominal:      General: Bowel sounds are normal  There is no distension  Palpations: Abdomen is soft  Tenderness: There is no abdominal tenderness  Genitourinary:     Comments: Alvarado catheter in place  Musculoskeletal:      Right lower leg: No edema  Left lower leg: No edema  Skin:     General: Skin is warm  Capillary Refill: Capillary refill takes more than 3 seconds  Neurological:      Mental Status: She is alert and oriented to person, place, and time  Motor: Weakness present  Gait: Gait abnormal    Psychiatric:         Mood and Affect: Mood normal          Behavior: Behavior normal          Pertinent Laboratory/Diagnostic Studies:   Reviewed in facility chart-stable      Current Medications   Medications reviewed and updated see facility [facility] for details  Current Outpatient Medications:   •  amLODIPine (NORVASC) 5 mg tablet, Take 1 tablet (5 mg total) by mouth daily, Disp: , Rfl: 0  •  aspirin 81 MG tablet, Take 1 tablet by mouth daily, Disp: , Rfl:   •  busPIRone (BUSPAR) 5 mg tablet, Take 1 tablet (5 mg total) by mouth 2 (two) times a day, Disp: 60 tablet, Rfl: 5  •  clonazePAM (KlonoPIN) 1 mg tablet, 1/2 po tid prn anxiety, Disp: 45 tablet, Rfl: 1       Please note:  Voice-recognition software may have been used in the preparation of this document  Occasional wrong word or "sound-alike" substitutions may have occurred due to the inherent limitations of voice recognition software  Interpretation should be guided by context           DEMARCO Foster  1/6/2023  5:29 PM

## 2023-01-06 NOTE — ASSESSMENT & PLAN NOTE
· Patient complains of chronic joint pain from arthritis  · Patient states she was taking tramadol at home  · Will order tramadol 25 mg every 8 hours as needed  · Continue Tylenol 600 mg every 6 hours as needed pain

## 2023-01-06 NOTE — ASSESSMENT & PLAN NOTE
· Patient recently hospitalized for altered mental status  · Found to be COVID-positive  · CT of head showed meningioma that was unchanged from prior study  · On exam today patient is alert and oriented x4  · During exam however, patient was grabbing for objects that were not there    · Continue clonazepam 0 5 mg every 8 hours as needed  ·  redirect and reorient as needed  · Continue 24/7 supportive care

## 2023-01-06 NOTE — ASSESSMENT & PLAN NOTE
· Patient was noted to be elevated during recent hospitalization, SBP greater than 200 mmHg    · Currently blood pressure has been stable 130/70  · Continue to monitor BP  · Continue amlodipine 5 mg daily

## 2023-01-06 NOTE — ASSESSMENT & PLAN NOTE
· Patient discharged from hospital with Alvarado catheter  · Draining clear yellow urine  · Will need voiding trial

## 2023-01-10 ENCOUNTER — NURSING HOME VISIT (OUTPATIENT)
Dept: GERIATRICS | Facility: OTHER | Age: 88
End: 2023-01-10

## 2023-01-10 DIAGNOSIS — U07.1 COVID-19: Primary | ICD-10-CM

## 2023-01-11 ENCOUNTER — NURSING HOME VISIT (OUTPATIENT)
Dept: WOUND CARE | Facility: HOSPITAL | Age: 88
End: 2023-01-11

## 2023-01-11 DIAGNOSIS — R26.2 AMBULATORY DYSFUNCTION: ICD-10-CM

## 2023-01-11 DIAGNOSIS — L89.152 PRESSURE INJURY OF SACRAL REGION, STAGE 2 (HCC): Primary | ICD-10-CM

## 2023-01-11 DIAGNOSIS — T14.8XXA ABRASION: ICD-10-CM

## 2023-01-12 ENCOUNTER — NURSING HOME VISIT (OUTPATIENT)
Dept: GERIATRICS | Facility: OTHER | Age: 88
End: 2023-01-12

## 2023-01-12 VITALS
OXYGEN SATURATION: 94 % | WEIGHT: 107.3 LBS | RESPIRATION RATE: 18 BRPM | SYSTOLIC BLOOD PRESSURE: 135 MMHG | BODY MASS INDEX: 20.96 KG/M2 | DIASTOLIC BLOOD PRESSURE: 65 MMHG | HEART RATE: 74 BPM | TEMPERATURE: 97.5 F

## 2023-01-12 VITALS
TEMPERATURE: 97 F | RESPIRATION RATE: 18 BRPM | DIASTOLIC BLOOD PRESSURE: 79 MMHG | SYSTOLIC BLOOD PRESSURE: 132 MMHG | OXYGEN SATURATION: 94 % | HEART RATE: 70 BPM

## 2023-01-12 DIAGNOSIS — K59.00 CONSTIPATION, UNSPECIFIED CONSTIPATION TYPE: Primary | ICD-10-CM

## 2023-01-12 DIAGNOSIS — F41.9 ANXIETY: Chronic | ICD-10-CM

## 2023-01-12 DIAGNOSIS — M19.90 ARTHRITIS: ICD-10-CM

## 2023-01-12 DIAGNOSIS — I16.0 HYPERTENSIVE URGENCY: ICD-10-CM

## 2023-01-12 DIAGNOSIS — G47.00 INSOMNIA, UNSPECIFIED TYPE: ICD-10-CM

## 2023-01-12 NOTE — PROGRESS NOTES
[facility]  [address]  [phone]  [address]  Code 31 (STR)        NAME: Luz Maria Jenkins  AGE: 80 y o  SEX: female CODE STATUS: CPR    DATE OF ENCOUNTER: 1/12/2023    Assessment and Plan     1  Constipation, unspecified constipation type  Assessment & Plan:  · Abdominal pain and rectal pain/pressure  · SNF staff unsure when patient had last bowel movement  · On exam patient has large amount of stool in rectum unable to pass  · Better Fleet Enema 1 time daily as needed constipation  · Staff administered enema, positive for large bowel movement, patient feels better  · Maintain adequate nutrition/ hydration  · Will order Colace 100 mg twice daily      2  Arthritis  Assessment & Plan:  · Patient complains of chronic joint pain from arthritis  · Continue tramadol 25 mg every 8 hours as needed  · Continue Tylenol 600 mg every 6 hours as needed pain      3  Anxiety  Assessment & Plan:  · Continue BuSpar 5 mg daily  · Continue clonazepam 0 5 mg every 8 hours as needed      4  Insomnia, unspecified type  Assessment & Plan:  · Maintain sleep/wake cycle  · Limit night time interruptions  · continue melatonin 3 mg at HS      5  Hypertensive urgency  Assessment & Plan:  · Patient was noted to be elevated during recent hospitalization, SBP greater than 200 mmHg  · Currently blood pressure has been stable 132/79  · Continue to monitor BP  · Continue amlodipine 5 mg daily         All medications and routine orders were reviewed and updated as needed  Chief Complaint     STR follow up visit  Patient's care was coordinated with nursing facility staff  Recent vitals, labs, and updated medications were review on [facility] system in facility        Past Medical and Surgical History      Past Medical History:   Diagnosis Date   • Anxiety    • Arthritis    • Hyperlipidemia    • Hypertension    • Hyperthyroidism      Past Surgical History:   Procedure Laterality Date   • ABDOMINAL SURGERY      peritoneal    • APPENDECTOMY     • HYSTERECTOMY       No Known Allergies       History of Present Illness     HPI  Jerry Xavier is a 80year old female, she is a STR patient of [facility] since 12/29/22  Past Medical Hx including but not limited to HLD, HTN, hyperthyroidism, arthritis, anxiety, CKD  She was seen in collaboration with nursing for medical management and STR follow up  Hospital Course  Patient was admitted to the hospital 12/23/22 for falls and increased confusion  Patient's son had Covid and was hospitalized  Patient tested positive for COVID on admission  CT of head showed meningioma that was unchanged from prior study  Chest x-ray was negative for acute cardiopulmonary disease  Altered mental status was thought to be from metabolic encephalopathy likely due to COVID pneumonia and sedating medications  Home dose of Klonopin was reduced  Patient was recommended for rehab  Rehab course   Parag Morris was seen and examined at bedside today  Patient is a reliable historian and is alert and oriented x4  On exam patient is in bed with family at bedside  Patient is complaining of abdominal pain, rectal pain, abdomen is tender to touch  Patient recently had donnelly catheter removed and on voiding trial  SNF staff unsure when last void or BM  Bladder scanned patient for 0 ml’s  Patient has large amount of stool in rectum, unable to pass  Fleet’s enema given, positive for BM, patient more comfortable  Will order Colace BID  Denies CP/SOB/N/V/D  Denies lightheadedness, dizziness, headaches, vision changes  Patient states they are eating well and staying hydrated  Denies any bowel or bladder issues  Per review of SNF records, Patient is eating 3 meals per day, consuming 50-75 %  Last documented BM 1/12/23  No concerns from nursing at this time  The patient's allergies, past medical, surgical, social and family history were reviewed and unchanged      Review of Systems     Review of Systems Constitutional: Positive for activity change  Negative for chills and fever  HENT: Negative  Negative for congestion  Eyes: Negative  Respiratory: Negative  Negative for cough, shortness of breath and wheezing  Cardiovascular: Negative  Gastrointestinal: Positive for abdominal distention, abdominal pain, constipation and rectal pain  Negative for diarrhea, nausea and vomiting  Endocrine: Negative  Genitourinary: Negative for difficulty urinating and dysuria  Musculoskeletal: Positive for arthralgias and gait problem  Skin: Negative  Allergic/Immunologic: Negative  Neurological: Positive for weakness  Negative for dizziness, light-headedness and headaches  Hematological: Negative  Psychiatric/Behavioral: Positive for sleep disturbance  Objective     Vitals:   Vitals:    01/12/23 1304   BP: 132/79   Pulse: 70   Resp: 18   Temp: (!) 97 °F (36 1 °C)   SpO2: 94%         Physical Exam  Vitals and nursing note reviewed  Constitutional:       General: She is not in acute distress  Appearance: Normal appearance  She is not ill-appearing  HENT:      Head: Normocephalic and atraumatic  Nose: No congestion  Mouth/Throat:      Mouth: Mucous membranes are moist    Eyes:      Conjunctiva/sclera: Conjunctivae normal    Cardiovascular:      Rate and Rhythm: Normal rate and regular rhythm  Pulses: Normal pulses  Heart sounds: Normal heart sounds  Pulmonary:      Effort: Pulmonary effort is normal  No respiratory distress  Breath sounds: Normal breath sounds  No wheezing  Abdominal:      General: Bowel sounds are normal  There is distension  Tenderness: There is abdominal tenderness  Skin:     General: Skin is warm  Capillary Refill: Capillary refill takes more than 3 seconds  Neurological:      Mental Status: She is alert and oriented to person, place, and time  Motor: Weakness present        Gait: Gait abnormal    Psychiatric: Mood and Affect: Mood normal          Behavior: Behavior normal          Pertinent Laboratory/Diagnostic Studies:   Reviewed in facility chart-stable      Current Medications   Medications reviewed and updated see facility [facility] for details  Current Outpatient Medications:   •  amLODIPine (NORVASC) 5 mg tablet, Take 1 tablet (5 mg total) by mouth daily, Disp: , Rfl: 0  •  aspirin 81 MG tablet, Take 1 tablet by mouth daily, Disp: , Rfl:   •  busPIRone (BUSPAR) 5 mg tablet, Take 1 tablet (5 mg total) by mouth 2 (two) times a day, Disp: 60 tablet, Rfl: 5  •  clonazePAM (KlonoPIN) 1 mg tablet, 1/2 po tid prn anxiety, Disp: 45 tablet, Rfl: 1       Please note:  Voice-recognition software may have been used in the preparation of this document  Occasional wrong word or "sound-alike" substitutions may have occurred due to the inherent limitations of voice recognition software  Interpretation should be guided by context           [facility] DEMARCO Alvarado  1/12/2023  1:13 PM

## 2023-01-12 NOTE — PROGRESS NOTES
[facility]  [address] [place] [address], [address] [place] [address]  [id number]    Progress note    NAME: Seema Montgomery  AGE: 80 y o  SEX: female [id number]      Patient Location     [address] [place] [address] rehab    Patient’s care was coordinated with nursing facility staff  Recent vitals, labs and updated medications were reviewed on [facility]  Past Medical, surgical, social, medication and allergy history and patient’s previous records reviewed  Assessment/Plan:    No problem-specific Assessment & Plan notes found for this encounter  Recurrent falls:  History of recurrent falls  Continue PT OT    Hypertensive urgency:   Blood pressure was noted to be high in 200s in ER  Currently stable on amlodipine 5 mg daily  COVID-19:  Patient tested positive for COVID-19 on 12/23  Chest x-ray was negative for acute cardiopulmonary disease  Patient remained asymptomatic, did not require any definitive treatment of supplemental oxygen  Continue supportive care  Remans afebrile with stable Sao2    CKD 3:  Recent creatinine was stable  Avoid hypotension and nephrotoxins  Lab Results   Component Value Date    SODIUM 138 12/29/2022    K 3 6 12/29/2022     12/29/2022    CO2 25 12/29/2022    BUN 20 12/29/2022    CREATININE 0 83 12/29/2022    GLUC 99 12/29/2022    CALCIUM 9 2 12/29/2022       Meningioma:  Patient has known history of meningioma  Recent CT head revealed unchanged findings of meningioma  Follow-up with neurology service    Hyperthyroidism:  Recent TSH  and freeT4 were within normal limits   Patient is currently not on any therapy for above    Anxiety:  Stable on Buspirone and Clonazepam     Urinary retention:  Donnelly catheter remains in place   Suspect donnelly was placed in the hospital  Will attempt voiidng trial    Reason for visit     F U  acute metabolic encephalopathy attributed to COVID-19 infection and meds    HPI     Patient is being seen for a follow up visit  Doing ok at present  Denies any dyspnea, chest pain GI or  complaints    Past Medical History:   Diagnosis Date   • Anxiety    • Arthritis    • Hyperlipidemia    • Hypertension    • Hyperthyroidism        Past Surgical History:   Procedure Laterality Date   • ABDOMINAL SURGERY      peritoneal    • APPENDECTOMY     • HYSTERECTOMY         Social History     Tobacco Use   Smoking Status Former   • Years: 15 00   • Types: Cigarettes   Smokeless Tobacco Never          Family History   Problem Relation Age of Onset   • Heart disease Father    • Pulmonary embolism Father    • Dementia Sister    • Lung cancer Other    • Diabetes Mother         No Known Allergies       Current Outpatient Medications:   •  amLODIPine (NORVASC) 5 mg tablet, Take 1 tablet (5 mg total) by mouth daily, Disp: , Rfl: 0  •  aspirin 81 MG tablet, Take 1 tablet by mouth daily, Disp: , Rfl:   •  busPIRone (BUSPAR) 5 mg tablet, Take 1 tablet (5 mg total) by mouth 2 (two) times a day, Disp: 60 tablet, Rfl: 5  •  clonazePAM (KlonoPIN) 1 mg tablet, 1/2 po tid prn anxiety, Disp: 45 tablet, Rfl: 1    Updated list was reviewed in [facility] system of facility  Vitals:    01/09/23 1637   BP: 135/65   Pulse: 74   Resp: 18   Temp: 97 5 °F (36 4 °C)   SpO2: 94%       Vital signs were reviewed in point click care    Review of Systems   Constitutional: Positive for fatigue  Negative for chills and fever  HENT: Negative for nosebleeds and rhinorrhea  Eyes: Negative for discharge and redness  Respiratory: Positive for cough  Negative for chest tightness, shortness of breath, wheezing and stridor  Cardiovascular: Negative for chest pain and leg swelling  Gastrointestinal: Negative for abdominal distention, abdominal pain, diarrhea and vomiting  Genitourinary: Negative for dysuria, flank pain and hematuria  Musculoskeletal: Positive for gait problem  Negative for arthralgias and back pain  Skin: Negative for pallor     Neurological: Positive for weakness (Generalized)  Negative for tremors, seizures, syncope and headaches  Psychiatric/Behavioral: Negative for agitation, behavioral problems and confusion  Physical Exam  Constitutional:       General: She is not in acute distress  HENT:      Head: Normocephalic and atraumatic  Nose: No rhinorrhea  Eyes:      General: No scleral icterus  Right eye: No discharge  Left eye: No discharge  Cardiovascular:      Rate and Rhythm: Normal rate and regular rhythm  Pulmonary:      Breath sounds: No wheezing, rhonchi or rales  Abdominal:      General: There is no distension  Palpations: Abdomen is soft  Tenderness: There is no abdominal tenderness  There is no guarding  Genitourinary:     Comments: Alvarado catheter in place  Musculoskeletal:      Cervical back: Neck supple  Right lower leg: No edema  Left lower leg: No edema  Skin:     Coloration: Skin is not jaundiced  Findings: No bruising  Neurological:      General: No focal deficit present  Mental Status: Mental status is at baseline  Cranial Nerves: No cranial nerve deficit  Psychiatric:         Mood and Affect: Mood normal          Behavior: Behavior normal            Diagnostic Data       Recent labs and imaging studies were reviewed  Lab Results   Component Value Date    SODIUM 138 12/29/2022    K 3 6 12/29/2022     12/29/2022    CO2 25 12/29/2022    BUN 20 12/29/2022    CREATININE 0 83 12/29/2022    GLUC 99 12/29/2022    CALCIUM 9 2 12/29/2022      Lab Results   Component Value Date    WBC 5 80 12/29/2022    HGB 11 1 (L) 12/29/2022    HCT 34 3 (L) 12/29/2022    MCV 96 12/29/2022     12/29/2022       Code Status:      Full code    Additional notes: ANTOLIN MARQUEZ the nurse    Attempt voiding trial    This note was electronically signed by Dr Kai Deleon

## 2023-01-12 NOTE — ASSESSMENT & PLAN NOTE
· Patient was noted to be elevated during recent hospitalization, SBP greater than 200 mmHg    · Currently blood pressure has been stable 132/79  · Continue to monitor BP  · Continue amlodipine 5 mg daily

## 2023-01-12 NOTE — ASSESSMENT & PLAN NOTE
· Patient complains of chronic joint pain from arthritis  · Continue tramadol 25 mg every 8 hours as needed  · Continue Tylenol 600 mg every 6 hours as needed pain

## 2023-01-12 NOTE — ASSESSMENT & PLAN NOTE
· Abdominal pain and rectal pain/pressure  · SNF staff unsure when patient had last bowel movement  · On exam patient has large amount of stool in rectum unable to pass  · Better Fleet Enema 1 time daily as needed constipation  · Staff administered enema, positive for large bowel movement, patient feels better  · Maintain adequate nutrition/ hydration  · Will order Colace 100 mg twice daily

## 2023-01-13 ENCOUNTER — NURSING HOME VISIT (OUTPATIENT)
Dept: GERIATRICS | Facility: OTHER | Age: 88
End: 2023-01-13

## 2023-01-13 VITALS
RESPIRATION RATE: 18 BRPM | TEMPERATURE: 97.3 F | DIASTOLIC BLOOD PRESSURE: 72 MMHG | OXYGEN SATURATION: 97 % | HEART RATE: 60 BPM | SYSTOLIC BLOOD PRESSURE: 130 MMHG

## 2023-01-13 DIAGNOSIS — G93.40 ACUTE ENCEPHALOPATHY: ICD-10-CM

## 2023-01-13 DIAGNOSIS — I16.0 HYPERTENSIVE URGENCY: ICD-10-CM

## 2023-01-13 DIAGNOSIS — R33.9 URINARY RETENTION: Primary | ICD-10-CM

## 2023-01-13 DIAGNOSIS — K59.00 CONSTIPATION, UNSPECIFIED CONSTIPATION TYPE: ICD-10-CM

## 2023-01-13 PROBLEM — T14.8XXA ABRASION: Status: ACTIVE | Noted: 2023-01-13

## 2023-01-13 PROBLEM — R26.2 AMBULATORY DYSFUNCTION: Status: ACTIVE | Noted: 2023-01-13

## 2023-01-13 PROBLEM — L89.152 PRESSURE INJURY OF SACRAL REGION, STAGE 2 (HCC): Status: ACTIVE | Noted: 2023-01-13

## 2023-01-13 RX ORDER — DOCUSATE SODIUM 100 MG/1
100 CAPSULE, LIQUID FILLED ORAL 2 TIMES DAILY
COMMUNITY

## 2023-01-13 RX ORDER — LANOLIN ALCOHOL/MO/W.PET/CERES
3 CREAM (GRAM) TOPICAL
COMMUNITY

## 2023-01-13 RX ORDER — TRAMADOL HYDROCHLORIDE 50 MG/1
25 TABLET ORAL EVERY 8 HOURS
COMMUNITY

## 2023-01-13 RX ORDER — ACETAMINOPHEN 325 MG/1
650 TABLET ORAL EVERY 6 HOURS PRN
COMMUNITY

## 2023-01-13 RX ORDER — BISACODYL 10 MG
10 SUPPOSITORY, RECTAL RECTAL DAILY PRN
COMMUNITY

## 2023-01-13 NOTE — ASSESSMENT & PLAN NOTE
Sacrum  · Wound is healed  · High risk for pressure injury, ordered hydraguard  · Continue to offload  · Facility RN can continue to monitor, if re-open, can reconsult wound CRNP

## 2023-01-13 NOTE — ASSESSMENT & PLAN NOTE
Right buttock  · Injury from the lining of the brief  · Purple, nonblanchable, with no obvious signs of infection  · Order hydraguard  · Facility to resize brief  · Follow-up next week

## 2023-01-13 NOTE — PROGRESS NOTES
[facility]  [address]  [phone]  [address]  Code 31 (STR)  Acute visit        NAME: Lisy Hannah  AGE: 80 y o  SEX: female CODE STATUS: CPR    DATE OF ENCOUNTER: 1/13/2023    Assessment and Plan     1  Urinary retention  Assessment & Plan:  · Patient with recent hospitalization, discharged with Alvarado catheter  · Patient failed recent voiding trial, bladder scan greater than 450, Alvarado reinserted 1/12/2023  · Patient denies abdominal pain, flank pain  · Alvarado catheter patent for dark yellow urine  · Encourage adequate p o  hydration   · consult urology for urinary retention      2  Constipation, unspecified constipation type  Assessment & Plan:  · Abdominal pain and rectal pain/pressure 1/12/2023  · Patient had bowel movement following fleets enema  · Reports feeling a little better today  · maintain adequate nutrition/ hydration  · Continue Dulcolax suppository daily as needed  · Colace 100 mg twice daily      3  Hypertensive urgency  Assessment & Plan:  · Patient was noted to be elevated during recent hospitalization, SBP greater than 200 mmHg  · Currently blood pressure has been stable, 130/72  · Continue to monitor BP  · Continue amlodipine 5 mg daily      4  Acute encephalopathy  Assessment & Plan:  · Patient recently hospitalized for altered mental status  · Found to be COVID-positive  · CT of head showed meningioma that was unchanged from prior study  · On exam today patient is alert and oriented x4  · Continue 24/7 supportive care at [facility]         All medications and routine orders were reviewed and updated as needed  Chief Complaint     Acute visit for urinary retention  Patient's care was coordinated with nursing facility staff  Recent vitals, labs, and updated medications were review on [facility] system in facility        Past Medical and Surgical History      Past Medical History:   Diagnosis Date   • Anxiety    • Arthritis    • Hyperlipidemia    • Hypertension    • Hyperthyroidism      Past Surgical History:   Procedure Laterality Date   • ABDOMINAL SURGERY      peritoneal    • APPENDECTOMY     • HYSTERECTOMY       No Known Allergies       History of Present Illness     MARIAN Murcia is a 80year old female, she is a STR patient of [facility] since 12/29/22  Past Medical Hx including but not limited to HLD, HTN, hyperthyroidism, arthritis, anxiety, CKD  She was seen in collaboration with nursing for medical management and acute visit for urinary retention  Hospital Course  Patient was admitted to the hospital 12/23/22 for falls and increased confusion  Patient's son had Covid and was hospitalized  Patient tested positive for COVID on admission  CT of head showed meningioma that was unchanged from prior study  Chest x-ray was negative for acute cardiopulmonary disease  Altered mental status was thought to be from metabolic encephalopathy likely due to COVID pneumonia and sedating medications  Home dose of Klonopin was reduced  Patient was recommended for rehab  Rehab course   Davidkaty Pinto was seen and examined at bedside today  Patient is a reliable historian and is alert and oriented x4  Patient was discharged from hospital with Alvarado catheter  Patient failed voiding trial at SNF/STR, bladder scan of greater than 450, Alvarado catheter was reinserted on 1/12/2023  On exam patient is resting in bed and appears comfortable  Alvarado catheter patent for dark yellow urine  Patient had episode of constipation 1/12/2023, patient had bowel movement following fleets enema, reports feeling a little better today, will continue Colace twice daily and Dulcolax suppository daily as needed  Will consult urology for urinary retention  CBC BMP for 1/16/2023  She denies CP/SOB/N/V/D  Denies lightheadedness, dizziness, headaches, vision changes  Patient states they are eating well and staying hydrated  Denies any bowel or bladder issues   Per review of SNF records, Patient is eating 3 meals per day, consuming 50-75 %  Last documented BM 1/13/23  No other concerns from nursing at this time  The patient's allergies, past medical, surgical, social and family history were reviewed and unchanged  Review of Systems     Review of Systems   Constitutional: Positive for activity change and appetite change  Negative for chills and fever  HENT: Negative  Negative for congestion  Eyes: Negative  Respiratory: Negative for cough, shortness of breath and wheezing  Cardiovascular: Negative  Negative for chest pain, palpitations and leg swelling  Gastrointestinal: Positive for constipation  Negative for abdominal distention, abdominal pain, diarrhea, nausea and vomiting  Endocrine: Negative  Genitourinary: Positive for difficulty urinating  Negative for dysuria, flank pain, hematuria and pelvic pain  Failed voiding trial, Alvarado catheter reinserted   Musculoskeletal: Positive for gait problem  Skin: Negative  Allergic/Immunologic: Negative  Neurological: Positive for weakness  Negative for dizziness, light-headedness and headaches  Hematological: Negative  Psychiatric/Behavioral: Positive for sleep disturbance  Objective     Vitals:   Vitals:    01/13/23 1920   BP: 130/72   Pulse: 60   Resp: 18   Temp: (!) 97 3 °F (36 3 °C)   SpO2: 97%         Physical Exam  Vitals and nursing note reviewed  Constitutional:       General: She is not in acute distress  Appearance: Normal appearance  She is not ill-appearing  HENT:      Head: Normocephalic and atraumatic  Nose: Nose normal  No congestion  Eyes:      Conjunctiva/sclera: Conjunctivae normal    Cardiovascular:      Rate and Rhythm: Normal rate and regular rhythm  Pulses: Normal pulses  Heart sounds: Normal heart sounds  Pulmonary:      Effort: Pulmonary effort is normal  No respiratory distress  Breath sounds: Normal breath sounds  No wheezing     Abdominal: General: Bowel sounds are normal  There is no distension  Palpations: Abdomen is soft  Tenderness: There is no abdominal tenderness  Genitourinary:     Comments:  Alvarado catheter in place  Musculoskeletal:      Right lower leg: No edema  Left lower leg: No edema  Skin:     General: Skin is warm  Capillary Refill: Capillary refill takes more than 3 seconds  Neurological:      Mental Status: She is alert and oriented to person, place, and time  Motor: Weakness present  Gait: Gait abnormal    Psychiatric:         Mood and Affect: Mood normal          Behavior: Behavior normal          Pertinent Laboratory/Diagnostic Studies:   Reviewed in facility chart-stable      Current Medications   Medications reviewed and updated see facility [facility] for details  Current Outpatient Medications:   •  bisacodyl (DULCOLAX) 10 mg suppository, Insert 10 mg into the rectum daily as needed, Disp: , Rfl:   •  docusate sodium (COLACE) 100 mg capsule, Take 100 mg by mouth 2 (two) times a day, Disp: , Rfl:   •  acetaminophen (TYLENOL) 325 mg tablet, Take 650 mg by mouth every 6 (six) hours as needed, Disp: , Rfl:   •  amLODIPine (NORVASC) 5 mg tablet, Take 1 tablet (5 mg total) by mouth daily, Disp: , Rfl: 0  •  aspirin 81 MG tablet, Take 1 tablet by mouth daily, Disp: , Rfl:   •  busPIRone (BUSPAR) 5 mg tablet, Take 1 tablet (5 mg total) by mouth 2 (two) times a day, Disp: 60 tablet, Rfl: 5  •  clonazePAM (KlonoPIN) 1 mg tablet, 1/2 po tid prn anxiety, Disp: 45 tablet, Rfl: 1  •  melatonin 3 mg, Take 3 mg by mouth daily at bedtime, Disp: , Rfl:   •  traMADol (ULTRAM) 50 mg tablet, Take 25 mg by mouth every 8 (eight) hours as needed, Disp: , Rfl:        Please note:  Voice-recognition software may have been used in the preparation of this document  Occasional wrong word or "sound-alike" substitutions may have occurred due to the inherent limitations of voice recognition software    Interpretation should be guided by context           [address] DEMARCO Joshi  1/13/2023  7:32 PM

## 2023-01-13 NOTE — PROGRESS NOTES
[address] MANAGEMENT   AND HYPERBARIC MEDICINE CENTER       Patient ID: Luz Maria Jenkins is a 80 y o  female Date of Birth 7/30/1933     Location of Service: [facility]    Performed wound round with: Wound team     Chief Complaint : Sacrum and right buttock    Wound Instructions:  Wound:  Sacrum and buttocks  Discontinue previous wound order  Cleanse the wound bed with soap and water, pat dry  Apply hydraguard to wound bed  Frequency : twice a day and prn for soiling  Offload all wounds  Turn and reposition frequently, maximum of every two hours  Instruct / Assist with weight shifting every 15 - 20 minutes when in chair  Increase protein intake  Monitor for any sign of infection or worsening, inform PCP or patient's primary physician in your facility  Allergies  Patient has no known allergies  Assessment & Plan:  1  Pressure injury of sacral region, stage 2 ([facility])  Assessment & Plan:  Sacrum  · Wound is healed  · High risk for pressure injury, ordered hydraguard  · Continue to offload  · Facility RN can continue to monitor, if re-open, can reconsult wound CRNP      2  Abrasion  Assessment & Plan:  Right buttock  · Injury from the lining of the brief  · Purple, nonblanchable, with no obvious signs of infection  · Order hydraguard  · Facility to resize brief  · Follow-up next week      3  Ambulatory dysfunction  Assessment & Plan: On short-term rehab             Subjective:   1/11/2023This is a 80 y o , female referred to our service for wound/ skin alterations on sacrum and buttocks  Patient have a complex medical history including but not limited to hyperthyroidism and CKD  Patient was referred by Senior Care Team  Patient was seen in collaboration with the facility wound team      Wound History:   Sacral wound: As per medical record review, the wound was first assessed in acute care on December 28, 2022 etiology is pressure injury  Right buttock: Discovered on January 11, 2023  Etiology: Shearing from the brief  Received patient in bed, seems comfortable  Patient is on pressure ulcer prevention protocol in the facility  As per medical record review, patient consumed 0 to 75% of her meals  Currently not on protein supplement  She has indwelling catheter due to urinary retention  She needs assistance with turning in bed  Currently on air mattress  Review of Systems   Constitutional: Negative  HENT: Negative  Eyes: Negative  Respiratory: Negative  Cardiovascular: Negative for chest pain and leg swelling  Gastrointestinal: Negative  Endocrine: Negative  Genitourinary: Negative  Musculoskeletal: Positive for gait problem  Skin: Positive for wound  See HPI   Neurological: Negative for dizziness and headaches  Psychiatric/Behavioral: Negative for behavioral problems  Objective:    Physical Exam  Constitutional:       Appearance: Normal appearance  HENT:      Head: Normocephalic and atraumatic  Nose: Nose normal       Mouth/Throat:      Pharynx: Oropharynx is clear  Eyes:      Conjunctiva/sclera: Conjunctivae normal    Cardiovascular:      Rate and Rhythm: Normal rate  Pulmonary:      Effort: Pulmonary effort is normal    Abdominal:      Tenderness: There is no abdominal tenderness  There is no guarding  Genitourinary:     Comments: With indwelling catheter  Musculoskeletal:         General: No tenderness  Cervical back: Normal range of motion  Right lower leg: No edema  Left lower leg: No edema  Comments: LROM   Skin:     Findings: Lesion present  Comments: Sacral wound : Wound is healed    Right buttock : Purple, nonblanchable, with no obvious sign of infection   Neurological:      Mental Status: She is alert        Gait: Gait abnormal    Psychiatric:         Mood and Affect: Mood normal          Behavior: Behavior normal               Procedures           Patient's care was coordinated with nursing facility staff  Recent vitals, labs and updated medications were reviewed on EMR or chart system of facility  Past Medical, surgical, social, medication and allergy history and patient's previous records were reviewed and updated as appropriate: Most up-to date information is available in the facility EMR where the patient is currently admitted      Patient Active Problem List   Diagnosis   • Anxiety   • Arthritis   • Hyperlipidemia   • Hyperthyroidism   • Meningioma (HCC)   • Stage 3b chronic kidney disease ([facility] [address] )   • Acute encephalopathy   • COVID-19   • Hypertensive urgency   • Frequent falls   • Alvarado catheter in place   • Insomnia   • Constipation   • Pressure injury of sacral region, stage 2 ([facility] [address] )   • Abrasion   • Ambulatory dysfunction     Past Medical History:   Diagnosis Date   • Anxiety    • Arthritis    • Hyperlipidemia    • Hypertension    • Hyperthyroidism      Past Surgical History:   Procedure Laterality Date   • ABDOMINAL SURGERY      peritoneal    • APPENDECTOMY     • HYSTERECTOMY       Social History     Socioeconomic History   • Marital status: /Civil Union     Spouse name: None   • Number of children: 2   • Years of education: None   • Highest education level: None   Occupational History   • Occupation: retired   Tobacco Use   • Smoking status: Former     Years: 15 00     Types: Cigarettes   • Smokeless tobacco: Never   Substance and Sexual Activity   • Alcohol use: No   • Drug use: No   • Sexual activity: Never   Other Topics Concern   • None   Social History Narrative    Always uses seatbelt    Apartment lives in apt as part of daughters house    Pt has a living will    Power of  in existence    Mu-ism     Supportive and safe    Active advance directive    Advance directive information unavailable     Social Determinants of Health     Financial Resource Strain: Low Risk    • Difficulty of Paying Living Expenses: Not hard at all   Food Insecurity: No Food Insecurity   • Worried About [employer] in the Last Year: Never true   • Ran Out of Food in the Last Year: Never true   Transportation Needs: No Transportation Needs   • Lack of Transportation (Medical): No   • Lack of Transportation (Non-Medical): No   Physical Activity: Not on file   Stress: Not on file   Social Connections: Not on file   Intimate Partner Violence: Not on file   Housing Stability: Low Risk    • Unable to Pay for Housing in the Last Year: No   • Number of Places Lived in the Last Year: 1   • Unstable Housing in the Last Year: No        Current Outpatient Medications:   •  amLODIPine (NORVASC) 5 mg tablet, Take 1 tablet (5 mg total) by mouth daily, Disp: , Rfl: 0  •  aspirin 81 MG tablet, Take 1 tablet by mouth daily, Disp: , Rfl:   •  busPIRone (BUSPAR) 5 mg tablet, Take 1 tablet (5 mg total) by mouth 2 (two) times a day, Disp: 60 tablet, Rfl: 5  •  clonazePAM (KlonoPIN) 1 mg tablet, 1/2 po tid prn anxiety, Disp: 45 tablet, Rfl: 1  Family History   Problem Relation Age of Onset   • Heart disease Father    • Pulmonary embolism Father    • Dementia Sister    • Lung cancer Other    • Diabetes Mother               Coordination of Care: Wound team aware of the treatment plan  Facility nurse will provide wound treatment and monitor the wound for any changes  Patient / Staff education : Patient / Staff was given education on sign of infection and pressure ulcer prevention  Patient/ Staff verbalized understanding     Follow up :  Next week    Voice-recognition software may have been used in the preparation of this document  Occasional wrong word or "sound-alike" substitutions may have occurred due to the inherent limitations of voice recognition software  Interpretation should be guided by context        DEMARCO Steward

## 2023-01-14 NOTE — ASSESSMENT & PLAN NOTE
· Patient recently hospitalized for altered mental status  · Found to be COVID-positive  · CT of head showed meningioma that was unchanged from prior study  · On exam today patient is alert and oriented x4  · Continue 24/7 supportive care at [address] [place] [address]

## 2023-01-14 NOTE — ASSESSMENT & PLAN NOTE
· Patient with recent hospitalization, discharged with Alvarado catheter  · Patient failed recent voiding trial, bladder scan greater than 450, Alvarado reinserted 1/12/2023  · Patient denies abdominal pain, flank pain  · Alvarado catheter patent for dark yellow urine  · Encourage adequate p o  hydration   · consult urology for urinary retention

## 2023-01-14 NOTE — ASSESSMENT & PLAN NOTE
· Patient was noted to be elevated during recent hospitalization, SBP greater than 200 mmHg    · Currently blood pressure has been stable, 130/72  · Continue to monitor BP  · Continue amlodipine 5 mg daily

## 2023-01-14 NOTE — ASSESSMENT & PLAN NOTE
· Abdominal pain and rectal pain/pressure 1/12/2023  · Patient had bowel movement following fleets enema  · Reports feeling a little better today  · maintain adequate nutrition/ hydration  · Continue Dulcolax suppository daily as needed  · Colace 100 mg twice daily

## 2023-01-16 ENCOUNTER — NURSING HOME VISIT (OUTPATIENT)
Dept: GERIATRICS | Facility: OTHER | Age: 88
End: 2023-01-16

## 2023-01-16 ENCOUNTER — TELEPHONE (OUTPATIENT)
Dept: UROLOGY | Facility: AMBULATORY SURGERY CENTER | Age: 88
End: 2023-01-16

## 2023-01-16 VITALS
DIASTOLIC BLOOD PRESSURE: 72 MMHG | SYSTOLIC BLOOD PRESSURE: 110 MMHG | RESPIRATION RATE: 16 BRPM | OXYGEN SATURATION: 96 % | TEMPERATURE: 97.7 F | HEART RATE: 78 BPM

## 2023-01-16 DIAGNOSIS — I16.0 HYPERTENSIVE URGENCY: Primary | ICD-10-CM

## 2023-01-16 DIAGNOSIS — G47.00 INSOMNIA, UNSPECIFIED TYPE: ICD-10-CM

## 2023-01-16 DIAGNOSIS — R26.2 AMBULATORY DYSFUNCTION: ICD-10-CM

## 2023-01-16 DIAGNOSIS — K59.00 CONSTIPATION, UNSPECIFIED CONSTIPATION TYPE: ICD-10-CM

## 2023-01-16 DIAGNOSIS — F41.9 ANXIETY: Chronic | ICD-10-CM

## 2023-01-16 DIAGNOSIS — R33.9 URINARY RETENTION: ICD-10-CM

## 2023-01-16 NOTE — ASSESSMENT & PLAN NOTE
· Abdominal pain and rectal pain/pressure 1/12/2023  · Patient had bowel movement following fleets enema  · Last documented BM 1/13/23  · Reports feeling better today  · Abdomen is soft, non distended   · maintain adequate nutrition/ hydration  · Continue Dulcolax suppository daily as needed  · Colace 100 mg twice daily

## 2023-01-16 NOTE — TELEPHONE ENCOUNTER
Please Triage  New Patient    What is the reason for the patient’s appointment? [facility] and stated that the patient failed her voiding trial  She was in the hospital and had urinary retention and was placed with a donnelly  They moved it on 01/13/23 and she failed the PVR  The catheter was reinserted  What office location does the patient prefer? [facility]    Imaging/Lab Results:    Do we accept the patient's insurance or is the patient Self-Pay? Insurance Provider: Avelina Fontenot  Plan Type/Number:  Member ID#: Has the patient had any previous Urologist(s)? no    Have patient records been requested? If not are records showing in Epic:     Has the patient had any outside testing done? Does the patient have a personal history of cancer?  No    Mallory Maldonado can be reached at [phone]

## 2023-01-16 NOTE — ASSESSMENT & PLAN NOTE
· Slept ok last night  · Maintain sleep/wake cycle  · Limit night time interruptions  · continue melatonin 3 mg at HS

## 2023-01-16 NOTE — ASSESSMENT & PLAN NOTE
· Patients BP was elevated during recent hospitalization, SBP greater than 200 mmHg    · BP trending 110//64   · Morning /72  · Continue to monitor BP  · Continue amlodipine 5 mg daily

## 2023-01-16 NOTE — ASSESSMENT & PLAN NOTE
Multifactorial  Continue PT/OT for strength training  Continue Fall Precautions  Ensure adequate nutrition/hydration   Monitor CBC/BMP    following for d/c planning

## 2023-01-16 NOTE — ASSESSMENT & PLAN NOTE
· Mood has been stable  · Continue BuSpar 5 mg daily  · Continue clonazepam 0 5 mg every 8 hours as needed

## 2023-01-16 NOTE — PROGRESS NOTES
[facility]  [address]  [phone]  Sari Voss  Code 31 (STR)        NAME: Jonas Naidu  AGE: 80 y o  SEX: female CODE STATUS: CPR    DATE OF ENCOUNTER: 1/16/2023    Assessment and Plan     1  Hypertensive urgency  Assessment & Plan:  · Patients BP was elevated during recent hospitalization, SBP greater than 200 mmHg  · BP trending 110//64   · Morning /72  · Continue to monitor BP  · Continue amlodipine 5 mg daily      2  Urinary retention  Assessment & Plan:  · Patient with recent hospitalization, discharged with Alvarado catheter for urinary retention  · Patient failed recent voiding trial, bladder scan greater than 450, Alvarado reinserted 1/12/2023  · Patient denies abdominal pain, flank pain  · Alvarado catheter patent for clear yellow urine  · Encourage adequate p o  hydration   · Facility to set up Urology appointment      3  Anxiety  Assessment & Plan:  · Mood has been stable  · Continue BuSpar 5 mg daily  · Continue clonazepam 0 5 mg every 8 hours as needed      4  Insomnia, unspecified type  Assessment & Plan:  · Slept ok last night  · Maintain sleep/wake cycle  · Limit night time interruptions  · continue melatonin 3 mg at HS      5  Constipation, unspecified constipation type  Assessment & Plan:  · Abdominal pain and rectal pain/pressure 1/12/2023  · Patient had bowel movement following fleets enema  · Last documented BM 1/13/23  · Reports feeling better today  · Abdomen is soft, non distended   · maintain adequate nutrition/ hydration  · Continue Dulcolax suppository daily as needed  · Colace 100 mg twice daily      6  Ambulatory dysfunction  Assessment & Plan:  Multifactorial  Continue PT/OT for strength training  Continue Fall Precautions  Ensure adequate nutrition/hydration   Monitor CBC/BMP    following for d/c planning          All medications and routine orders were reviewed and updated as needed      Chief Complaint     STR follow up visit  Patient's care was coordinated with nursing facility staff  Recent vitals, labs, and updated medications were review on [facility] system in facility  Past Medical and Surgical History      Past Medical History:   Diagnosis Date   • Anxiety    • Arthritis    • Hyperlipidemia    • Hypertension    • Hyperthyroidism      Past Surgical History:   Procedure Laterality Date   • ABDOMINAL SURGERY      peritoneal    • APPENDECTOMY     • HYSTERECTOMY       No Known Allergies       History of Present Illness     MARIAN Nelson is an 80year old female, she is a STR patient of [facility] since 12/29/22  Past Medical Hx including but not limited to HLD, HTN, hyperthyroidism, arthritis, anxiety, CKD  She was seen in collaboration with nursing for medical management and acute visit for urinary retention  Hospital Course  Patient was admitted to the hospital 12/23/22 for falls and increased confusion  Patient's son had Covid and was hospitalized  Patient tested positive for COVID on admission  CT of head showed meningioma that was unchanged from prior study  Chest x-ray was negative for acute cardiopulmonary disease  Altered mental status was thought to be from metabolic encephalopathy likely due to COVID pneumonia and sedating medications  Home dose of Klonopin was reduced  Patient was recommended for rehab  Rehab course   Nafisa Scott was seen and examined at bedside today  Patient is a reliable historian and is alert and oriented x4  On exam patient is resting in bed and appears to be comfortable  Facility will arrange for patient to see urology regarding urinary retention and donnelly catheter  She continues to have chronic pain in her legs due to arthritis, will schedule tramadol dose OTC to reflect home schedule  She denies CP/SOB/N/V/D  Denies lightheadedness, dizziness, headaches, vision changes  Patient states they are eating well and staying hydrated  Denies any other bowel or bladder issues   Per review of SNF records, Patient is eating 3 meals per day, consuming  %  Last documented BM 1/13/23  No other concerns from nursing at this time  The patient's allergies, past medical, surgical, social and family history were reviewed and unchanged  Review of Systems     Review of Systems   Constitutional: Positive for activity change  Negative for appetite change, chills and fever  HENT: Negative  Negative for congestion  Eyes: Negative  Respiratory: Negative  Negative for cough, shortness of breath and wheezing  Cardiovascular: Negative for chest pain, palpitations and leg swelling  Gastrointestinal: Positive for constipation  Negative for abdominal distention, abdominal pain, diarrhea, nausea and vomiting  Endocrine: Negative  Genitourinary: Positive for difficulty urinating  Failed voiding trial, donnelly reinserted 1/13/23   Musculoskeletal: Positive for arthralgias and gait problem  Chronic arthritis pain   Skin: Negative  Allergic/Immunologic: Negative  Neurological: Positive for weakness  Negative for dizziness and headaches  Hematological: Negative  Psychiatric/Behavioral: Positive for sleep disturbance  Objective     Vitals:   Vitals:    01/16/23 1504   BP: 110/72   Pulse: 78   Resp: 16   Temp: 97 7 °F (36 5 °C)   SpO2: 96%         Physical Exam  Vitals and nursing note reviewed  Constitutional:       General: She is not in acute distress  Appearance: Normal appearance  She is not ill-appearing  HENT:      Head: Normocephalic and atraumatic  Nose: No congestion  Mouth/Throat:      Mouth: Mucous membranes are moist    Eyes:      Conjunctiva/sclera: Conjunctivae normal    Cardiovascular:      Rate and Rhythm: Normal rate and regular rhythm  Pulses: Normal pulses  Heart sounds: Normal heart sounds  Pulmonary:      Effort: Pulmonary effort is normal  No respiratory distress  Breath sounds: Normal breath sounds   No wheezing  Abdominal:      General: Bowel sounds are normal  There is no distension  Palpations: Abdomen is soft  Tenderness: There is no abdominal tenderness  Musculoskeletal:      Right lower leg: No edema  Left lower leg: No edema  Skin:     General: Skin is warm  Capillary Refill: Capillary refill takes more than 3 seconds  Neurological:      Mental Status: She is alert and oriented to person, place, and time  Motor: Weakness present  Gait: Gait abnormal    Psychiatric:         Mood and Affect: Mood normal          Behavior: Behavior normal          Pertinent Laboratory/Diagnostic Studies:   Reviewed in facility chart-stable      Current Medications   Medications reviewed and updated see facility [facility] for details  Current Outpatient Medications:   •  acetaminophen (TYLENOL) 325 mg tablet, Take 650 mg by mouth every 6 (six) hours as needed, Disp: , Rfl:   •  amLODIPine (NORVASC) 5 mg tablet, Take 1 tablet (5 mg total) by mouth daily, Disp: , Rfl: 0  •  aspirin 81 MG tablet, Take 1 tablet by mouth daily, Disp: , Rfl:   •  bisacodyl (DULCOLAX) 10 mg suppository, Insert 10 mg into the rectum daily as needed, Disp: , Rfl:   •  busPIRone (BUSPAR) 5 mg tablet, Take 1 tablet (5 mg total) by mouth 2 (two) times a day, Disp: 60 tablet, Rfl: 5  •  clonazePAM (KlonoPIN) 1 mg tablet, 1/2 po tid prn anxiety, Disp: 45 tablet, Rfl: 1  •  docusate sodium (COLACE) 100 mg capsule, Take 100 mg by mouth 2 (two) times a day, Disp: , Rfl:   •  melatonin 3 mg, Take 3 mg by mouth daily at bedtime, Disp: , Rfl:   •  traMADol (ULTRAM) 50 mg tablet, Take 25 mg by mouth every 8 (eight) hours, Disp: , Rfl:        Please note:  Voice-recognition software may have been used in the preparation of this document  Occasional wrong word or "sound-alike" substitutions may have occurred due to the inherent limitations of voice recognition software  Interpretation should be guided by context  [facility], [address] [place] [address]  1/16/2023  3:23 PM

## 2023-01-16 NOTE — ASSESSMENT & PLAN NOTE
· Patient with recent hospitalization, discharged with Alvarado catheter for urinary retention  · Patient failed recent voiding trial, bladder scan greater than 450, Alvarado reinserted 1/12/2023  · Patient denies abdominal pain, flank pain  · Alvarado catheter patent for clear yellow urine  · Encourage adequate p o  hydration   · Facility to set up Urology appointment

## 2023-01-18 ENCOUNTER — NURSING HOME VISIT (OUTPATIENT)
Dept: GERIATRICS | Facility: OTHER | Age: 88
End: 2023-01-18

## 2023-01-18 VITALS
TEMPERATURE: 97.3 F | DIASTOLIC BLOOD PRESSURE: 70 MMHG | HEART RATE: 74 BPM | OXYGEN SATURATION: 96 % | SYSTOLIC BLOOD PRESSURE: 122 MMHG | RESPIRATION RATE: 18 BRPM

## 2023-01-18 DIAGNOSIS — G47.00 INSOMNIA, UNSPECIFIED TYPE: ICD-10-CM

## 2023-01-18 DIAGNOSIS — I16.0 HYPERTENSIVE URGENCY: Primary | ICD-10-CM

## 2023-01-18 DIAGNOSIS — R33.9 URINARY RETENTION: ICD-10-CM

## 2023-01-18 DIAGNOSIS — M19.90 ARTHRITIS: ICD-10-CM

## 2023-01-18 DIAGNOSIS — R26.2 AMBULATORY DYSFUNCTION: ICD-10-CM

## 2023-01-18 DIAGNOSIS — F41.9 ANXIETY: Chronic | ICD-10-CM

## 2023-01-18 NOTE — ASSESSMENT & PLAN NOTE
· Patients BP was elevated during recent hospitalization, SBP greater than 200 mmHg    · BP trending 110//64   · Morning /70  · Continue to monitor BP  · Continue amlodipine 5 mg daily

## 2023-01-18 NOTE — ASSESSMENT & PLAN NOTE
· Patient with recent hospitalization, discharged with Alvarado catheter for urinary retention  · Patient failed recent voiding trial, bladder scan greater than 450, Alvarado reinserted 1/12/2023  · Patient denies abdominal pain, flank pain  · Alvarado catheter patent for clear yellow urine  · Encourage adequate p o  hydration   · Will need voiding trial  · Facility to set up Urology appointment

## 2023-01-18 NOTE — ASSESSMENT & PLAN NOTE
Multifactorial  Continue PT/OT for strength training  Continue Fall/safety precautions  Ensure adequate nutrition/hydration   Monitor CBC/BMP    following for d/c planning

## 2023-01-18 NOTE — ASSESSMENT & PLAN NOTE
· Patient was made aware today that her son had passed away  · Ordered alprazolam 0 25 mg twice daily as needed x3 days  · Continue BuSpar 5 mg daily  · Continue to monitor

## 2023-01-18 NOTE — ASSESSMENT & PLAN NOTE
· Patient with history of chronic joint pain from arthritis  · Continue tramadol 25 mg every 8 hours   · Continue Tylenol 600 mg every 6 hours as needed pain

## 2023-01-18 NOTE — PROGRESS NOTES
[facility]  [address]  [phone]  [place]  Code 31 (STR)        NAME: Evelyn Guillaume  AGE: 80 y o  SEX: female CODE STATUS: CPR    DATE OF ENCOUNTER: 1/18/2023    Assessment and Plan     1  Hypertensive urgency  Assessment & Plan:  · Patients BP was elevated during recent hospitalization, SBP greater than 200 mmHg  · BP trending 110//64   · Morning /70  · Continue to monitor BP  · Continue amlodipine 5 mg daily      2  Arthritis  Assessment & Plan:  · Patient with history of chronic joint pain from arthritis  · Continue tramadol 25 mg every 8 hours   · Continue Tylenol 600 mg every 6 hours as needed pain      3  Urinary retention  Assessment & Plan:  · Patient with recent hospitalization, discharged with Alvarado catheter for urinary retention  · Patient failed recent voiding trial, bladder scan greater than 450, Alvarado reinserted 1/12/2023  · Patient denies abdominal pain, flank pain  · Alvarado catheter patent for clear yellow urine  · Encourage adequate p o  hydration   · Will need voiding trial  · Facility to set up Urology appointment      4  Anxiety  Assessment & Plan:  · Patient was made aware today that her son had passed away  · Ordered alprazolam 0 25 mg twice daily as needed x3 days  · Continue BuSpar 5 mg daily  · Continue to monitor        5  Insomnia, unspecified type  Assessment & Plan:    · Maintain sleep/wake cycle  · Limit night time interruptions  · continue melatonin 3 mg at HS      6  Ambulatory dysfunction  Assessment & Plan:  Multifactorial  Continue PT/OT for strength training  Continue Fall/safety precautions  Ensure adequate nutrition/hydration   Monitor CBC/BMP    following for d/c planning          All medications and routine orders were reviewed and updated as needed  Chief Complaint     STR follow up visit  Patient's care was coordinated with nursing facility staff   Recent vitals, labs, and updated medications were review on [facility] system in facility  Past Medical and Surgical History      Past Medical History:   Diagnosis Date   • Anxiety    • Arthritis    • Hyperlipidemia    • Hypertension    • Hyperthyroidism      Past Surgical History:   Procedure Laterality Date   • ABDOMINAL SURGERY      peritoneal    • APPENDECTOMY     • HYSTERECTOMY       No Known Allergies       History of Present Illness     MARIAN Guy is an 80-year-old female, she is a STR patient of [facility] since 12/29/22  Past Medical Hx including but not limited to HLD, HTN, hyperthyroidism, arthritis, anxiety, CKD  She was seen in collaboration with nursing for medical management and acute visit for urinary retention  Hospital Course  Patient was admitted to the hospital 12/23/22 for falls and increased confusion  Patient's son had Covid and was hospitalized  Patient tested positive for COVID on admission  CT of head showed meningioma that was unchanged from prior study  Chest x-ray was negative for acute cardiopulmonary disease  Altered mental status was thought to be from metabolic encephalopathy likely due to COVID pneumonia and sedating medications  Home dose of Klonopin was reduced  Patient was recommended for rehab  Rehab course   Madison Lancaster was seen and examined at bedside today  Patient is a reliable historian and is alert and oriented x4  On exam patient is sitting at side of the bed working with physical therapy  She does not appear to be in any distress and says she is feeling okay  Alvarado catheter intact draining CYU, will follow-up with urology  In the afternoon, patient was made aware that her son had passed away today  Will order alprazolam 0 25 mg twice daily as needed x3 days and monitor  At the time of exam she denies CP/SOB/N/V/C/D  Denies lightheadedness, dizziness, headaches, vision changes  Patient states they are eating well and staying hydrated  Denies any other bowel or bladder issues   Per review of SNF records, Patient is eating 3 meals per day, consuming 50-75 %  Last documented BM 1/16/23  No other concerns from nursing at this time  The patient's allergies, past medical, surgical, social and family history were reviewed and unchanged  Review of Systems     Review of Systems   Constitutional: Positive for activity change  Negative for appetite change, chills and fever  HENT: Negative  Negative for congestion  Eyes: Negative  Respiratory: Negative  Negative for cough, shortness of breath and wheezing  Cardiovascular: Negative  Negative for chest pain, palpitations and leg swelling  Gastrointestinal: Negative  Negative for abdominal distention, abdominal pain, constipation, diarrhea, nausea and vomiting  Endocrine: Negative  Genitourinary: Positive for difficulty urinating  Negative for decreased urine volume, flank pain and hematuria  Alvarado catheter in place   Musculoskeletal: Positive for arthralgias and gait problem  Chronic arthritis pain   Skin: Negative  Allergic/Immunologic: Negative  Neurological: Positive for weakness  Negative for dizziness, light-headedness and headaches  Psychiatric/Behavioral: Negative  Negative for sleep disturbance  Objective     Vitals:   Vitals:    01/18/23 1006   BP: 122/70   Pulse: 74   Resp: 18   Temp: (!) 97 3 °F (36 3 °C)   SpO2: 96%         Physical Exam  Vitals and nursing note reviewed  Constitutional:       Appearance: Normal appearance  She is not ill-appearing  HENT:      Head: Normocephalic and atraumatic  Nose: No congestion  Mouth/Throat:      Mouth: Mucous membranes are moist    Eyes:      Conjunctiva/sclera: Conjunctivae normal    Cardiovascular:      Rate and Rhythm: Normal rate and regular rhythm  Pulses: Normal pulses  Heart sounds: Normal heart sounds  Pulmonary:      Effort: Pulmonary effort is normal  No respiratory distress  Breath sounds: Normal breath sounds   No wheezing  Abdominal:      General: Bowel sounds are normal  There is no distension  Palpations: Abdomen is soft  Tenderness: There is no abdominal tenderness  Musculoskeletal:      Right lower leg: No edema  Left lower leg: No edema  Skin:     General: Skin is warm  Capillary Refill: Capillary refill takes more than 3 seconds  Neurological:      Mental Status: She is alert and oriented to person, place, and time  Motor: Weakness present  Gait: Gait abnormal    Psychiatric:         Mood and Affect: Mood normal          Behavior: Behavior normal          Pertinent Laboratory/Diagnostic Studies:   Reviewed in facility chart-stable      Current Medications   Medications reviewed and updated see facility [facility] for details  Current Outpatient Medications:   •  acetaminophen (TYLENOL) 325 mg tablet, Take 650 mg by mouth every 6 (six) hours as needed, Disp: , Rfl:   •  amLODIPine (NORVASC) 5 mg tablet, Take 1 tablet (5 mg total) by mouth daily, Disp: , Rfl: 0  •  aspirin 81 MG tablet, Take 1 tablet by mouth daily, Disp: , Rfl:   •  bisacodyl (DULCOLAX) 10 mg suppository, Insert 10 mg into the rectum daily as needed, Disp: , Rfl:   •  busPIRone (BUSPAR) 5 mg tablet, Take 1 tablet (5 mg total) by mouth 2 (two) times a day, Disp: 60 tablet, Rfl: 5  •  docusate sodium (COLACE) 100 mg capsule, Take 100 mg by mouth 2 (two) times a day, Disp: , Rfl:   •  melatonin 3 mg, Take 3 mg by mouth daily at bedtime, Disp: , Rfl:   •  traMADol (ULTRAM) 50 mg tablet, Take 25 mg by mouth every 8 (eight) hours, Disp: , Rfl:        Please note:  Voice-recognition software may have been used in the preparation of this document  Occasional wrong word or "sound-alike" substitutions may have occurred due to the inherent limitations of voice recognition software  Interpretation should be guided by context           [address] [place] [address]  1/18/2023  4:06 PM

## 2023-01-19 ENCOUNTER — NURSING HOME VISIT (OUTPATIENT)
Dept: WOUND CARE | Facility: HOSPITAL | Age: 88
End: 2023-01-19

## 2023-01-19 DIAGNOSIS — T14.8XXA ABRASION: Primary | ICD-10-CM

## 2023-01-20 NOTE — PROGRESS NOTES
[address] MANAGEMENT   AND [facility]       Patient ID: Kiley Pagan is a 80 y o  female Date of Birth 7/30/1933     [facility]    Performed wound round with: Wound team     Chief Complaint : Sacrum and right buttock    Wound Instructions:  Wound:  Sacrum and buttocks  Discontinue previous wound order  Cleanse the skin with soap and water, pat dry  Apply zguard to skin  Frequency : twice a day and prn for soiling  Offload all wounds  Turn and reposition frequently, maximum of every two hours  Instruct / Assist with weight shifting every 15 - 20 minutes when in chair  Increase protein intake  Monitor for any sign of infection or worsening, inform PCP or patient's primary physician in your facility  Allergies  Patient has no known allergies  Assessment & Plan:  1  Abrasion  Assessment & Plan:  Abrasion on the right buttock is healed  Ordered Z guard for pressure ulcer prevention and moisture management  Continue to offload  Sign off             Subjective:   1/11/2023This is a 80 y o , female referred to our service for wound/ skin alterations on sacrum and buttocks  Patient have a complex medical history including but not limited to hyperthyroidism and CKD  Patient was referred by Senior Care Team  Patient was seen in collaboration with the facility wound team      Wound History:   Sacral wound: As per medical record review, the wound was first assessed in acute care on December 28, 2022 etiology is pressure injury  Right buttock: Discovered on January 11, 2023  Etiology: Shearing from the brief  Received patient in bed, seems comfortable  Patient is on pressure ulcer prevention protocol in the facility  As per medical record review, patient consumed 0 to 75% of her meals  Currently not on protein supplement  She has indwelling catheter due to urinary retention  She needs assistance with turning in bed    Currently on air [facility]  January 18, 2023  Follow-up for wound on the right buttock  Received patient in bed, denies pain, No significant issues related to the wound  Review of Systems   Constitutional: Negative  HENT: Negative  Eyes: Negative  Respiratory: Negative  Cardiovascular: Negative for chest pain and leg swelling  Gastrointestinal: Negative  Endocrine: Negative  Genitourinary: Negative  Musculoskeletal: Positive for gait problem  Skin: Positive for wound  See HPI   Neurological: Negative for dizziness and headaches  Psychiatric/Behavioral: Negative for behavioral problems  Objective:    Physical Exam  Constitutional:       Appearance: Normal appearance  HENT:      Head: Normocephalic and atraumatic  Nose: Nose normal       Mouth/Throat:      Pharynx: Oropharynx is clear  Eyes:      Conjunctiva/sclera: Conjunctivae normal    Pulmonary:      Effort: Pulmonary effort is normal    Abdominal:      Tenderness: There is no abdominal tenderness  There is no guarding  Genitourinary:     Comments: With indwelling catheter  Musculoskeletal:         General: No tenderness  Cervical back: Normal range of motion  Right lower leg: No edema  Left lower leg: No edema  Comments: LROM   Skin:     Findings: No lesion  Comments: Sacral wound : Wound is healed    Right buttock : wound is healed   Neurological:      Mental Status: She is alert  Gait: Gait abnormal    Psychiatric:         Mood and Affect: Mood normal          Behavior: Behavior normal               Procedures           Patient's care was coordinated with nursing facility staff  Recent vitals, labs and updated medications were reviewed on EMR or chart system of facility   Past Medical, surgical, social, medication and allergy history and patient's previous records were reviewed and updated as appropriate: Most up-to date information is available in the facility EMR where the patient is currently admitted  Patient Active Problem List   Diagnosis   • Anxiety   • Arthritis   • Hyperlipidemia   • Hyperthyroidism   • Meningioma (HCC)   • Stage 3b chronic kidney disease ([facility] [address] )   • Acute encephalopathy   • COVID-19   • Hypertensive urgency   • Frequent falls   • Alvarado catheter in place   • Insomnia   • Constipation   • Pressure injury of sacral region, stage 2 ([facility] [address] )   • Abrasion   • Ambulatory dysfunction   • Urinary retention     Past Medical History:   Diagnosis Date   • Anxiety    • Arthritis    • Hyperlipidemia    • Hypertension    • Hyperthyroidism      Past Surgical History:   Procedure Laterality Date   • ABDOMINAL SURGERY      peritoneal    • APPENDECTOMY     • HYSTERECTOMY       Social History     Socioeconomic History   • Marital status: /Civil Union     Spouse name: None   • Number of children: 2   • Years of education: None   • Highest education level: None   Occupational History   • Occupation: retired   Tobacco Use   • Smoking status: Former     Years: 15 00     Types: Cigarettes   • Smokeless tobacco: Never   Substance and Sexual Activity   • Alcohol use: No   • Drug use: No   • Sexual activity: Never   Other Topics Concern   • None   Social History Narrative    Always uses seatbelt    Apartment lives in apt as part of daughters house    Pt has a living will    Power of  in existence    [facility]     Supportive and safe    Active advance directive    Advance directive information unavailable     Social Determinants of Health     Financial Resource Strain: Low Risk    • Difficulty of Paying Living Expenses: Not hard at all   Food Insecurity: No Food Insecurity   • Worried About Running Out of Food in the Last Year: Never true   • Ran Out of Food in the Last Year: Never true   Transportation Needs: No Transportation Needs   • Lack of Transportation (Medical): No   • Lack of Transportation (Non-Medical):  No   Physical Activity: Not on file   Stress: Not on file   Social Connections: Not on file   Intimate Partner Violence: Not on file   Housing Stability: Low Risk    • Unable to Pay for Housing in the Last Year: No   • Number of Places Lived in the Last Year: 1   • Unstable Housing in the Last Year: No        Current Outpatient Medications:   •  acetaminophen (TYLENOL) 325 mg tablet, Take 650 mg by mouth every 6 (six) hours as needed, Disp: , Rfl:   •  amLODIPine (NORVASC) 5 mg tablet, Take 1 tablet (5 mg total) by mouth daily, Disp: , Rfl: 0  •  aspirin 81 MG tablet, Take 1 tablet by mouth daily, Disp: , Rfl:   •  bisacodyl (DULCOLAX) 10 mg suppository, Insert 10 mg into the rectum daily as needed, Disp: , Rfl:   •  busPIRone (BUSPAR) 5 mg tablet, Take 1 tablet (5 mg total) by mouth 2 (two) times a day, Disp: 60 tablet, Rfl: 5  •  docusate sodium (COLACE) 100 mg capsule, Take 100 mg by mouth 2 (two) times a day, Disp: , Rfl:   •  melatonin 3 mg, Take 3 mg by mouth daily at bedtime, Disp: , Rfl:   •  traMADol (ULTRAM) 50 mg tablet, Take 25 mg by mouth every 8 (eight) hours, Disp: , Rfl:   Family History   Problem Relation Age of Onset   • Heart disease Father    • Pulmonary embolism Father    • Dementia Sister    • Lung cancer Other    • Diabetes Mother               Coordination of Care: Wound team aware of the treatment plan  Facility nurse will provide wound treatment and monitor the wound for any changes  Patient / Staff education : Patient / Staff was given education on sign of infection and pressure ulcer prevention  Patient/ Staff verbalized understanding     Follow up :  Sign off    Voice-recognition software may have been used in the preparation of this document  Occasional wrong word or "sound-alike" substitutions may have occurred due to the inherent limitations of voice recognition software  Interpretation should be guided by context        DEMARCO Hope

## 2023-01-20 NOTE — ASSESSMENT & PLAN NOTE
Abrasion on the right buttock is healed  Ordered Z guard for pressure ulcer prevention and moisture management  Continue to offload  Sign off

## 2023-01-23 ENCOUNTER — OFFICE VISIT (OUTPATIENT)
Dept: UROLOGY | Facility: CLINIC | Age: 88
End: 2023-01-23

## 2023-01-23 ENCOUNTER — NURSING HOME VISIT (OUTPATIENT)
Dept: GERIATRICS | Facility: OTHER | Age: 88
End: 2023-01-23

## 2023-01-23 VITALS
HEART RATE: 72 BPM | RESPIRATION RATE: 18 BRPM | DIASTOLIC BLOOD PRESSURE: 72 MMHG | OXYGEN SATURATION: 94 % | SYSTOLIC BLOOD PRESSURE: 138 MMHG | TEMPERATURE: 97.6 F

## 2023-01-23 VITALS — OXYGEN SATURATION: 95 % | DIASTOLIC BLOOD PRESSURE: 60 MMHG | SYSTOLIC BLOOD PRESSURE: 90 MMHG | HEART RATE: 65 BPM

## 2023-01-23 DIAGNOSIS — M19.90 ARTHRITIS: ICD-10-CM

## 2023-01-23 DIAGNOSIS — I16.0 HYPERTENSIVE URGENCY: Primary | ICD-10-CM

## 2023-01-23 DIAGNOSIS — R26.2 AMBULATORY DYSFUNCTION: ICD-10-CM

## 2023-01-23 DIAGNOSIS — F41.9 ANXIETY: Chronic | ICD-10-CM

## 2023-01-23 DIAGNOSIS — R33.9 URINARY RETENTION: Primary | ICD-10-CM

## 2023-01-23 DIAGNOSIS — R33.9 URINARY RETENTION: ICD-10-CM

## 2023-01-23 RX ORDER — CLONAZEPAM 0.5 MG/1
0.5 TABLET ORAL 3 TIMES DAILY PRN
COMMUNITY

## 2023-01-23 NOTE — ASSESSMENT & PLAN NOTE
· Patient is saddened at the passing of her son  · She reports feeling anxious at times  · Continue clonazepam 0 5 mg every 8 hours as needed for anxiety  · Continue BuSpar 5 mg daily  · Continue to monitor

## 2023-01-23 NOTE — ASSESSMENT & PLAN NOTE
· Patient with history of chronic joint pain from arthritis  · Continue PT/OT  · Maintain fall/safety precautions  · Continue tramadol 25 mg every 8 hours   · Continue Tylenol 600 mg every 6 hours as needed pain

## 2023-01-23 NOTE — ASSESSMENT & PLAN NOTE
· Patient with recent hospitalization, discharged with Alvarado catheter for urinary retention  · Patient failed recent voiding trial, bladder scan greater than 450, Alvarado reinserted 1/12/2023  · Patient denies abdominal pain, flank pain  · Alvarado catheter patent for clear yellow urine  · Encourage adequate p o  hydration   · Patient had follow-up appointment with urology today  · Urology recommends keeping Alvarado in for 1 week, then do voiding trial, if voiding trial fails keep Alvarado in for 1 month, repeat voiding trial, if this voiding trial fails do routine Alvarado changes

## 2023-01-23 NOTE — PROGRESS NOTES
[facility]  [address]  [phone]  Denton Scheuermann  Code 31 (STR)          NAME: Nusrat Redd  AGE: 80 y o  SEX: female CODE STATUS: CPR    DATE OF ENCOUNTER: 1/23/2023    Assessment and Plan     1  Hypertensive urgency  Assessment & Plan:  · Patients BP was elevated during recent hospitalization, SBP greater than 200 mmHg  · BP trending 110//64   · Morning /72  · Continue to monitor BP  · Continue amlodipine 5 mg daily      2  Arthritis  Assessment & Plan:  · Patient with history of chronic joint pain from arthritis  · Continue PT/OT  · Maintain fall/safety precautions  · Continue tramadol 25 mg every 8 hours   · Continue Tylenol 600 mg every 6 hours as needed pain      3  Urinary retention  Assessment & Plan:  · Patient with recent hospitalization, discharged with Alvarado catheter for urinary retention  · Patient failed recent voiding trial, bladder scan greater than 450, Alvarado reinserted 1/12/2023  · Patient denies abdominal pain, flank pain  · Alvarado catheter patent for clear yellow urine  · Encourage adequate p o  hydration   · Patient had follow-up appointment with urology today  · Urology recommends keeping Alvarado in for 1 week, then do voiding trial, if voiding trial fails keep Alvarado in for 1 month, repeat voiding trial, if this voiding trial fails do routine Alvarado changes      4  Ambulatory dysfunction  Assessment & Plan:  Multifactorial  Continue PT/OT for strength training  Continue Fall/safety precautions  Ensure adequate nutrition/hydration   Monitor CBC/BMP    following for d/c planning       5  Anxiety  Assessment & Plan:  · Patient is saddened at the passing of her son  · She reports feeling anxious at times  · Continue clonazepam 0 5 mg every 8 hours as needed for anxiety  · Continue BuSpar 5 mg daily  · Continue to monitor           All medications and routine orders were reviewed and updated as needed      Chief Complaint     STR follow up visit  Patient's care was coordinated with nursing facility staff  Recent vitals, labs, and updated medications were review on [facility] system in facility  Past Medical and Surgical History      Past Medical History:   Diagnosis Date   • Anxiety    • Arthritis    • Hyperlipidemia    • Hypertension    • Hyperthyroidism      Past Surgical History:   Procedure Laterality Date   • ABDOMINAL SURGERY      peritoneal    • APPENDECTOMY     • HYSTERECTOMY       No Known Allergies       History of Present Illness     MARIAN Mauro is an 55-year-old female, she is a STR patient of [facility] since 12/29/22  Past Medical Hx including but not limited to HLD, HTN, hyperthyroidism, arthritis, anxiety, CKD  She was seen in collaboration with nursing for medical management and acute visit for urinary retention  Hospital Course  Patient was admitted to the hospital 12/23/22 for falls and increased confusion  Patient's son had Covid and was hospitalized  Patient tested positive for COVID on admission  CT of head showed meningioma that was unchanged from prior study  Chest x-ray was negative for acute cardiopulmonary disease  Altered mental status was thought to be from metabolic encephalopathy likely due to COVID pneumonia and sedating medications  Home dose of Klonopin was reduced  Patient was recommended for rehab  Rehab course   Rukhsana Garcia was seen and examined at bedside today  Patient is a reliable historian and is alert and oriented x4  Patient had follow-up appointment with urology today  Urology recommends leaving Alvarado catheter in place for 1 week then attempting voiding trial   If this voiding trial fails will leave Alvarado catheter in place x1 month then repeat voiding trial   If that voiding trial fails recommend routine Alvarado changes  Patient states she is doing okay  She recently learned of the passing of her son and reports feeling sad and anxious at times    She states her anxiety medication does help somewhat  At the time of exam she denies CP/SOB/N/V/C/D  Denies lightheadedness, dizziness, headaches, vision changes  She denies any other bowel or bladder issues  Per review of SNF records, Patient is eating 3 meals per day, consuming 50-75 %  Last documented BM 1/22/23  No other concerns from nursing at this time  The patient's allergies, past medical, surgical, social and family history were reviewed and unchanged  Review of Systems     Review of Systems   Constitutional: Positive for activity change  Negative for appetite change, chills and fever  HENT: Negative  Negative for congestion  Eyes: Negative  Respiratory: Negative  Negative for cough, shortness of breath and wheezing  Cardiovascular: Negative  Negative for chest pain, palpitations and leg swelling  Gastrointestinal: Negative  Negative for abdominal distention, abdominal pain, constipation, diarrhea, nausea and vomiting  Endocrine: Negative  Genitourinary: Positive for difficulty urinating  Negative for dysuria  Alvarado catheter in place   Musculoskeletal: Positive for arthralgias and gait problem  Chronic pain from arthritis   Skin: Negative  Allergic/Immunologic: Negative  Neurological: Positive for weakness  Negative for dizziness and headaches  Hematological: Negative  Psychiatric/Behavioral: Positive for dysphoric mood and sleep disturbance  Patient's son recently passed away         Objective     Vitals:   Vitals:    01/23/23 1005   BP: 138/72   Pulse: 72   Resp: 18   Temp: 97 6 °F (36 4 °C)   SpO2: 94%         Physical Exam  Vitals and nursing note reviewed  Constitutional:       General: She is not in acute distress  Appearance: Normal appearance  She is not ill-appearing  HENT:      Head: Normocephalic and atraumatic  Nose: Nose normal  No congestion        Mouth/Throat:      Mouth: Mucous membranes are moist    Eyes:      Conjunctiva/sclera: Conjunctivae normal  Cardiovascular:      Rate and Rhythm: Normal rate and regular rhythm  Pulses: Normal pulses  Heart sounds: Normal heart sounds  Pulmonary:      Effort: Pulmonary effort is normal  No respiratory distress  Breath sounds: Normal breath sounds  No wheezing  Abdominal:      General: Bowel sounds are normal  There is no distension  Palpations: Abdomen is soft  Tenderness: There is no abdominal tenderness  Musculoskeletal:      Right lower leg: No edema  Left lower leg: No edema  Skin:     General: Skin is warm  Capillary Refill: Capillary refill takes more than 3 seconds  Neurological:      Mental Status: She is alert and oriented to person, place, and time  Motor: Weakness present  Gait: Gait abnormal    Psychiatric:         Behavior: Behavior normal       Comments: patient is saddened over the passing of her son         Pertinent Laboratory/Diagnostic Studies:   Reviewed in facility chart-stable      Current Medications   Medications reviewed and updated see facility [facility] for details        Current Outpatient Medications:   •  clonazePAM (KlonoPIN) 0 5 mg tablet, Take 0 5 mg by mouth 3 (three) times a day as needed, Disp: , Rfl:   •  acetaminophen (TYLENOL) 325 mg tablet, Take 650 mg by mouth every 6 (six) hours as needed, Disp: , Rfl:   •  amLODIPine (NORVASC) 5 mg tablet, Take 1 tablet (5 mg total) by mouth daily, Disp: , Rfl: 0  •  aspirin 81 MG tablet, Take 1 tablet by mouth daily, Disp: , Rfl:   •  bisacodyl (DULCOLAX) 10 mg suppository, Insert 10 mg into the rectum daily as needed, Disp: , Rfl:   •  busPIRone (BUSPAR) 5 mg tablet, Take 1 tablet (5 mg total) by mouth 2 (two) times a day, Disp: 60 tablet, Rfl: 5  •  docusate sodium (COLACE) 100 mg capsule, Take 100 mg by mouth 2 (two) times a day, Disp: , Rfl:   •  melatonin 3 mg, Take 3 mg by mouth daily at bedtime, Disp: , Rfl:   •  traMADol (ULTRAM) 50 mg tablet, Take 25 mg by mouth every 8 (eight) hours, Disp: , Rfl:        Please note:  Voice-recognition software may have been used in the preparation of this document  Occasional wrong word or "sound-alike" substitutions may have occurred due to the inherent limitations of voice recognition software  Interpretation should be guided by context           [facility] DEMARCO Alvarado  1/23/2023  4:45 PM

## 2023-01-23 NOTE — PROGRESS NOTES
1  Urinary retention          Assessment and plan:     1  Urinary retention  - repeat voiding trial at facility in 1 week  Residual threshold of 350mL for donnelly reinsertion  If fails, then replace donnelly and repeat in 1 month  If fails again, continue with routine donnelly changes  - continue hydration, bowel regimen, and physical activity  Blade Reyes PA-C      Chief Complaint     Urinary retention    History of Present Illness     Mikey Leahy is a 80 y o  female presenting today for consultation  Admitted 12/2022 with altered mental status - found to be positive for COVID  Noted to have urinary retention and donnelly placed  Failed voiding trial and donnelly reinserted 1/12/23  Patient reports that prior to hospitalization she was having some urinary dribbling and the occasional urinary infection  Has never previously required donnelly catheter       Present today on a stretcher  Has some fecal incontinence  Minimally ambulatory  Laboratory     Lab Results   Component Value Date    CREATININE 0 83 12/29/2022       Review of Systems     Review of Systems   Constitutional: Negative  Cardiovascular: Negative  Gastrointestinal: Negative  Genitourinary: Donnelly catheter in place             Allergies     No Known Allergies    Physical Exam     Physical Exam  Constitutional:       General: She is not in acute distress  Appearance: She is normal weight  She is not ill-appearing, toxic-appearing or diaphoretic  HENT:      Head: Normocephalic and atraumatic  Eyes:      General:         Right eye: No discharge  Left eye: No discharge  Conjunctiva/sclera: Conjunctivae normal    Pulmonary:      Effort: Pulmonary effort is normal  No respiratory distress  Genitourinary:     Comments: Donnelly in place draining yellow urine with sediment  Wearing a depends with stool in it  Musculoskeletal:      Comments: Present on stretcher   Skin:     General: Skin is warm and dry  Coloration: Skin is not jaundiced or pale  Neurological:      General: No focal deficit present  Mental Status: She is alert  Psychiatric:         Mood and Affect: Mood normal          Behavior: Behavior normal          Thought Content:  Thought content normal            Vital Signs     Vitals:    01/23/23 1115   BP: 90/60   BP Location: Right arm   Patient Position: Sitting   Cuff Size: Standard   Pulse: 65   SpO2: 95%         Current Medications       Current Outpatient Medications:   •  acetaminophen (TYLENOL) 325 mg tablet, Take 650 mg by mouth every 6 (six) hours as needed, Disp: , Rfl:   •  amLODIPine (NORVASC) 5 mg tablet, Take 1 tablet (5 mg total) by mouth daily, Disp: , Rfl: 0  •  aspirin 81 MG tablet, Take 1 tablet by mouth daily, Disp: , Rfl:   •  bisacodyl (DULCOLAX) 10 mg suppository, Insert 10 mg into the rectum daily as needed, Disp: , Rfl:   •  busPIRone (BUSPAR) 5 mg tablet, Take 1 tablet (5 mg total) by mouth 2 (two) times a day, Disp: 60 tablet, Rfl: 5  •  docusate sodium (COLACE) 100 mg capsule, Take 100 mg by mouth 2 (two) times a day, Disp: , Rfl:   •  melatonin 3 mg, Take 3 mg by mouth daily at bedtime, Disp: , Rfl:   •  traMADol (ULTRAM) 50 mg tablet, Take 25 mg by mouth every 8 (eight) hours, Disp: , Rfl:       Active Problems     Patient Active Problem List   Diagnosis   • Anxiety   • Arthritis   • Hyperlipidemia   • Hyperthyroidism   • Meningioma ([facility] [address] )   • Stage 3b chronic kidney disease ([facility] [address] )   • Acute encephalopathy   • COVID-19   • Hypertensive urgency   • Frequent falls   • Alvarado catheter in place   • Insomnia   • Constipation   • Pressure injury of sacral region, stage 2 (HCC)   • Abrasion   • Ambulatory dysfunction   • Urinary retention         Past Medical History     Past Medical History:   Diagnosis Date   • Anxiety    • Arthritis    • Hyperlipidemia    • Hypertension    • Hyperthyroidism          Surgical History     Past Surgical History:   Procedure Laterality Date   • ABDOMINAL SURGERY      peritoneal    • APPENDECTOMY     • HYSTERECTOMY           Family History     Family History   Problem Relation Age of Onset   • Heart disease Father    • Pulmonary embolism Father    • Dementia Sister    • Lung cancer Other    • Diabetes Mother          Social History     Social History       Radiology

## 2023-01-25 ENCOUNTER — NURSING HOME VISIT (OUTPATIENT)
Dept: GERIATRICS | Facility: OTHER | Age: 88
End: 2023-01-25

## 2023-01-25 DIAGNOSIS — F41.9 ANXIETY: Chronic | ICD-10-CM

## 2023-01-25 DIAGNOSIS — M19.90 ARTHRITIS: ICD-10-CM

## 2023-01-25 DIAGNOSIS — R33.9 URINARY RETENTION: Primary | ICD-10-CM

## 2023-01-25 DIAGNOSIS — R26.2 AMBULATORY DYSFUNCTION: ICD-10-CM

## 2023-01-25 NOTE — ASSESSMENT & PLAN NOTE
· Patient's son recently passed away  · She reports feeling sad/anxious at times  · She had trouble sleeping last night  · Continue clonazepam 0 5 mg every 8 hours as needed for anxiety  · Continue melatonin 3 mg at HS  · Continue BuSpar 5 mg daily  · Continue to monitor

## 2023-01-25 NOTE — PROGRESS NOTES
[facility]  [address]  [phone]  [place]  Code 31 (STR)        NAME: Evelyn Guillaume  AGE: 80 y o  SEX: female CODE STATUS: CPR    DATE OF ENCOUNTER: 1/25/2023    Assessment and Plan     1  Urinary retention  Assessment & Plan:  · Patient with recent hospitalization, discharged with Donnelly catheter for urinary retention  · Patient failed recent voiding trial, bladder scan greater than 450, Donnelly reinserted 1/12/2023  · Patient denies abdominal pain, flank pain  · Donnelly catheter patent for clear yellow urine  · Encourage adequate p o  hydration   · Urology recommends d/c donnelly next week for voiding trial, if failed reinsert      2  Anxiety  Assessment & Plan:  · Patient's son recently passed away  · She reports feeling sad/anxious at times  · She had trouble sleeping last night  · Continue clonazepam 0 5 mg every 8 hours as needed for anxiety  · Continue melatonin 3 mg at HS  · Continue BuSpar 5 mg daily  · Continue to monitor        3  Ambulatory dysfunction  Assessment & Plan:  Multifactorial  Continue PT/OT for strength training  Continue Fall/safety precautions  Ensure adequate nutrition/hydration   Monitor CBC/BMP    following for d/c planning       4  Arthritis  Assessment & Plan:  · Patient with history of chronic joint pain from arthritis  · Continue PT/OT  · Maintain fall/safety precautions  · Continue tramadol 25 mg every 8 hours   · Continue Tylenol 600 mg every 6 hours as needed pain         All medications and routine orders were reviewed and updated as needed  Chief Complaint     STR follow up visit  Patient's care was coordinated with nursing facility staff  Recent vitals, labs, and updated medications were review on [facility] system in facility        Past Medical and Surgical History      Past Medical History:   Diagnosis Date   • Anxiety    • Arthritis    • Hyperlipidemia    • Hypertension    • Hyperthyroidism      Past Surgical History:   Procedure Laterality Date   • ABDOMINAL SURGERY      peritoneal    • APPENDECTOMY     • HYSTERECTOMY       No Known Allergies       History of Present Illness     HPI  Tere Wright is an 80-year-old female, she is a STR patient of [facility] since 12/29/22  Past Medical Hx including but not limited to HLD, HTN, hyperthyroidism, arthritis, anxiety, CKD  She was seen in collaboration with nursing for medical management and acute visit for urinary retention  Hospital Course  Patient was admitted to the hospital 12/23/22 for falls and increased confusion  Patient's son had Covid and was hospitalized  Patient tested positive for COVID on admission  CT of head showed meningioma that was unchanged from prior study  Chest x-ray was negative for acute cardiopulmonary disease  Altered mental status was thought to be from metabolic encephalopathy likely due to COVID pneumonia and sedating medications  Home dose of Klonopin was reduced  Patient was recommended for rehab  Rehab course   Hannahia Yahir was seen and examined at bedside today  Patient is a reliable historian and is alert and oriented x4  On exam patient is resting in bed, she appears tired today and states she had trouble sleeping last night  Patient reports chronic leg pain from her arthritis which improves with tramadol  Otherwise she says she is doing ok  On exam she denies CP/SOB/N/V/C/D  Denies lightheadedness, dizziness, headaches, vision changes  She denies any other bowel or bladder issues  Per review of SNF records, Patient is eating 3 meals per day, consuming 50-75 %  Last documented BM 1/24/23  No other concerns from nursing at this time  The patient's allergies, past medical, surgical, social and family history were reviewed and unchanged  Review of Systems     Review of Systems   Constitutional: Positive for activity change  Negative for appetite change, chills and fever  HENT: Negative  Negative for congestion  Eyes: Negative  Respiratory: Negative  Negative for cough, shortness of breath and wheezing  Cardiovascular: Negative  Negative for chest pain, palpitations and leg swelling  Gastrointestinal: Negative  Negative for abdominal distention, abdominal pain, constipation, diarrhea, nausea and vomiting  Endocrine: Negative  Genitourinary: Positive for difficulty urinating  Alvarado catheter in place, CY[facility]   Musculoskeletal: Positive for arthralgias and gait problem  Skin: Negative  Allergic/Immunologic: Negative  Neurological: Positive for weakness  Negative for dizziness, light-headedness and headaches  Hematological: Negative  Psychiatric/Behavioral: Positive for dysphoric mood and sleep disturbance  Objective     Vitals:   Vitals:   no new vitals in [facility]    Physical Exam  Vitals and nursing note reviewed  Constitutional:       General: She is not in acute distress  Appearance: Normal appearance  She is not ill-appearing  HENT:      Head: Normocephalic and atraumatic  Nose: Nose normal  No congestion  Mouth/Throat:      Mouth: Mucous membranes are moist    Eyes:      Conjunctiva/sclera: Conjunctivae normal    Cardiovascular:      Rate and Rhythm: Normal rate and regular rhythm  Pulses: Normal pulses  Heart sounds: Normal heart sounds  Pulmonary:      Effort: Pulmonary effort is normal  No respiratory distress  Breath sounds: Normal breath sounds  No wheezing  Abdominal:      General: Bowel sounds are normal  There is no distension  Palpations: Abdomen is soft  Tenderness: There is no abdominal tenderness  Musculoskeletal:      Right lower leg: No edema  Left lower leg: No edema  Skin:     General: Skin is warm  Capillary Refill: Capillary refill takes more than 3 seconds  Neurological:      Mental Status: She is alert and oriented to person, place, and time  Motor: Weakness present        Gait: Gait abnormal    Psychiatric: Behavior: Behavior normal          Pertinent Laboratory/Diagnostic Studies:   Reviewed in facility chart-stable      Current Medications   Medications reviewed and updated see facility [facility] for details  Current Outpatient Medications:   •  acetaminophen (TYLENOL) 325 mg tablet, Take 650 mg by mouth every 6 (six) hours as needed, Disp: , Rfl:   •  amLODIPine (NORVASC) 5 mg tablet, Take 1 tablet (5 mg total) by mouth daily, Disp: , Rfl: 0  •  aspirin 81 MG tablet, Take 1 tablet by mouth daily, Disp: , Rfl:   •  bisacodyl (DULCOLAX) 10 mg suppository, Insert 10 mg into the rectum daily as needed, Disp: , Rfl:   •  busPIRone (BUSPAR) 5 mg tablet, Take 1 tablet (5 mg total) by mouth 2 (two) times a day, Disp: 60 tablet, Rfl: 5  •  clonazePAM (KlonoPIN) 0 5 mg tablet, Take 0 5 mg by mouth 3 (three) times a day as needed, Disp: , Rfl:   •  docusate sodium (COLACE) 100 mg capsule, Take 100 mg by mouth 2 (two) times a day, Disp: , Rfl:   •  melatonin 3 mg, Take 3 mg by mouth daily at bedtime, Disp: , Rfl:   •  traMADol (ULTRAM) 50 mg tablet, Take 25 mg by mouth every 8 (eight) hours, Disp: , Rfl:        Please note:  Voice-recognition software may have been used in the preparation of this document  Occasional wrong word or "sound-alike" substitutions may have occurred due to the inherent limitations of voice recognition software  Interpretation should be guided by context           [address] [place] [address]  1/25/2023  4:28 PM

## 2023-01-25 NOTE — ASSESSMENT & PLAN NOTE
· Patient with recent hospitalization, discharged with Donnelly catheter for urinary retention  · Patient failed recent voiding trial, bladder scan greater than 450, Donnelly reinserted 1/12/2023  · Patient denies abdominal pain, flank pain  · Donnelly catheter patent for clear yellow urine  · Encourage adequate p o  hydration   · Urology recommends d/c donnelly next week for voiding trial, if failed reinsert

## 2023-01-30 ENCOUNTER — NURSING HOME VISIT (OUTPATIENT)
Dept: GERIATRICS | Facility: OTHER | Age: 88
End: 2023-01-30

## 2023-01-30 VITALS
RESPIRATION RATE: 18 BRPM | DIASTOLIC BLOOD PRESSURE: 60 MMHG | SYSTOLIC BLOOD PRESSURE: 120 MMHG | TEMPERATURE: 97.6 F | OXYGEN SATURATION: 95 % | HEART RATE: 60 BPM

## 2023-01-30 DIAGNOSIS — F41.9 ANXIETY: Chronic | ICD-10-CM

## 2023-01-30 DIAGNOSIS — R26.2 AMBULATORY DYSFUNCTION: ICD-10-CM

## 2023-01-30 DIAGNOSIS — M19.90 ARTHRITIS: ICD-10-CM

## 2023-01-30 DIAGNOSIS — I16.0 HYPERTENSIVE URGENCY: ICD-10-CM

## 2023-01-30 DIAGNOSIS — R33.9 URINARY RETENTION: Primary | ICD-10-CM

## 2023-01-30 NOTE — ASSESSMENT & PLAN NOTE
· Patient with recent hospitalization, discharged with Alvarado catheter for urinary retention  · Patient failed recent voiding trial, bladder scan greater than 450, Alvarado reinserted 1/12/2023  · Patient denies abdominal pain, flank pain  · Alvarado to be removed today for voiding trial per urology recommendations  · Will monitor

## 2023-01-30 NOTE — ASSESSMENT & PLAN NOTE
· Patient with history of chronic joint pain from arthritis  · Continue PT/OT for strength training/stability  · Maintain fall/safety precautions  · Continue tramadol 25 mg every 8 hours   · Continue Tylenol 600 mg every 6 hours as needed pain

## 2023-01-30 NOTE — ASSESSMENT & PLAN NOTE
· She reports feeling sad/anxious at times  · She had trouble sleeping last night  · Continue clonazepam 0 5 mg every 8 hours as needed for anxiety  · Continue melatonin 3 mg at HS  · Continue BuSpar 5 mg daily  · Continue to monitor

## 2023-01-30 NOTE — PROGRESS NOTES
[facility]  [address]  [phone]  Chetan Fuller  Code 31 (STR)        NAME: An Sevilla  AGE: 80 y o  SEX: female CODE STATUS: CPR    DATE OF ENCOUNTER: 1/30/2023    Assessment and Plan     1  Urinary retention  Assessment & Plan:  · Patient with recent hospitalization, discharged with Alvarado catheter for urinary retention  · Patient failed recent voiding trial, bladder scan greater than 450, Alvarado reinserted 1/12/2023  · Patient denies abdominal pain, flank pain  · Alvarado to be removed today for voiding trial per urology recommendations  · Will monitor      2  Ambulatory dysfunction  Assessment & Plan:  Multifactorial  Continue PT/OT for strength training  Continue Fall/safety precautions  Ensure adequate nutrition/hydration   Monitor CBC/BMP    following for d/c planning       3  Arthritis  Assessment & Plan:  · Patient with history of chronic joint pain from arthritis  · Continue PT/OT for strength training/stability  · Maintain fall/safety precautions  · Continue tramadol 25 mg every 8 hours   · Continue Tylenol 600 mg every 6 hours as needed pain      4  Hypertensive urgency  Assessment & Plan:  · Patients BP was elevated during recent hospitalization, SBP greater than 200 mmHg  · BP trending 110/-70  · Morning /60  · Continue to monitor BP  · Continue amlodipine 5 mg daily      5  Anxiety  Assessment & Plan:  · She reports feeling sad/anxious at times  · She had trouble sleeping last night  · Continue clonazepam 0 5 mg every 8 hours as needed for anxiety  · Continue melatonin 3 mg at HS  · Continue BuSpar 5 mg daily  · Continue to monitor           All medications and routine orders were reviewed and updated as needed  Chief Complaint     STR follow up visit  Patient's care was coordinated with nursing facility staff  Recent vitals, labs, and updated medications were review on [facility] system in facility        Past Medical and Surgical History      Past Medical History:   Diagnosis Date   • Anxiety    • Arthritis    • Hyperlipidemia    • Hypertension    • Hyperthyroidism      Past Surgical History:   Procedure Laterality Date   • ABDOMINAL SURGERY      peritoneal    • APPENDECTOMY     • HYSTERECTOMY       No Known Allergies       History of Present Illness     MARIAN Jensen is an 59-year-old female, she is a STR patient of [facility] since 12/29/22  Past Medical Hx including but not limited to HLD, HTN, hyperthyroidism, arthritis, anxiety, CKD  She was seen in collaboration with nursing for medical management and acute visit for urinary retention  Hospital Course  Patient was admitted to the hospital 12/23/22 for falls and increased confusion  Patient's son had Covid and was hospitalized  Patient tested positive for COVID on admission  CT of head showed meningioma that was unchanged from prior study  Chest x-ray was negative for acute cardiopulmonary disease  Altered mental status was thought to be from metabolic encephalopathy likely due to COVID pneumonia and sedating medications  Home dose of Klonopin was reduced  Patient was recommended for rehab  Rehab course   Sherine Velazco was seen and examined at bedside today  Patient is a reliable historian and is alert and oriented x4  On exam patient is sitting in her chair at the side of the bed  She does not appear to be in any distress and says she is doing ok today  She continues to have chronic arthritis pain for which she takes tramadol and continues to have trouble sleeping for which she can take Melatonin  She has been participating in therapy  Patient will have donnelly catheter removed today for voiding trial, will monitor  On exam she denies CP/SOB/N/V/C/D  Denies lightheadedness, dizziness, headaches, vision changes  She denies any bowel or bladder issues  Per review of SNF records, Patient is eating 3 meals per day, consuming 50-75 %  Last documented BM 1/29/23   No other concerns from nursing at this time  The patient's allergies, past medical, surgical, social and family history were reviewed and unchanged  Review of Systems     Review of Systems   Constitutional: Negative for activity change  HENT: Negative  Negative for congestion  Eyes: Negative  Respiratory: Negative  Negative for cough, shortness of breath and wheezing  Cardiovascular: Negative  Negative for chest pain, palpitations and leg swelling  Gastrointestinal: Negative for abdominal distention, abdominal pain, constipation, diarrhea, nausea and vomiting  Endocrine: Negative  Genitourinary: Positive for difficulty urinating  Alvarado catheter to be removed today for voiding trial   Musculoskeletal: Positive for arthralgias and gait problem  Skin: Negative  Allergic/Immunologic: Negative  Neurological: Negative for dizziness and headaches  Hematological: Negative  Psychiatric/Behavioral: Positive for dysphoric mood and sleep disturbance  Objective     Vitals:   Vitals:    01/30/23 1018   BP: 120/60   Pulse: 60   Resp: 18   Temp: 97 6 °F (36 4 °C)   SpO2: 95%         Physical Exam  Vitals and nursing note reviewed  Constitutional:       General: She is not in acute distress  Appearance: Normal appearance  She is not ill-appearing  HENT:      Head: Normocephalic and atraumatic  Nose: No congestion  Mouth/Throat:      Mouth: Mucous membranes are moist    Eyes:      Conjunctiva/sclera: Conjunctivae normal    Cardiovascular:      Rate and Rhythm: Normal rate and regular rhythm  Heart sounds: Normal heart sounds  Pulmonary:      Effort: Pulmonary effort is normal       Breath sounds: Normal breath sounds  No wheezing  Abdominal:      General: Bowel sounds are normal  There is no distension  Palpations: Abdomen is soft  Tenderness: There is no abdominal tenderness  Musculoskeletal:      Right lower leg: No edema        Left lower leg: No edema    Skin:     General: Skin is warm  Capillary Refill: Capillary refill takes more than 3 seconds  Neurological:      Mental Status: She is alert and oriented to person, place, and time  Motor: Weakness present  Gait: Gait abnormal    Psychiatric:         Behavior: Behavior normal          Thought Content: Thought content normal          Pertinent Laboratory/Diagnostic Studies:   Reviewed in facility chart-stable      Current Medications   Medications reviewed and updated see facility [facility] for details  Current Outpatient Medications:   •  acetaminophen (TYLENOL) 325 mg tablet, Take 650 mg by mouth every 6 (six) hours as needed, Disp: , Rfl:   •  amLODIPine (NORVASC) 5 mg tablet, Take 1 tablet (5 mg total) by mouth daily, Disp: , Rfl: 0  •  aspirin 81 MG tablet, Take 1 tablet by mouth daily, Disp: , Rfl:   •  bisacodyl (DULCOLAX) 10 mg suppository, Insert 10 mg into the rectum daily as needed, Disp: , Rfl:   •  busPIRone (BUSPAR) 5 mg tablet, Take 1 tablet (5 mg total) by mouth 2 (two) times a day, Disp: 60 tablet, Rfl: 5  •  clonazePAM (KlonoPIN) 0 5 mg tablet, Take 0 5 mg by mouth 3 (three) times a day as needed, Disp: , Rfl:   •  docusate sodium (COLACE) 100 mg capsule, Take 100 mg by mouth 2 (two) times a day, Disp: , Rfl:   •  melatonin 3 mg, Take 3 mg by mouth daily at bedtime, Disp: , Rfl:   •  traMADol (ULTRAM) 50 mg tablet, Take 25 mg by mouth every 8 (eight) hours, Disp: , Rfl:        Please note:  Voice-recognition software may have been used in the preparation of this document  Occasional wrong word or "sound-alike" substitutions may have occurred due to the inherent limitations of voice recognition software  Interpretation should be guided by context           [address] [place] [address]  1/30/2023  3:22 PM

## 2023-01-30 NOTE — ASSESSMENT & PLAN NOTE
· Patients BP was elevated during recent hospitalization, SBP greater than 200 mmHg    · BP trending 110/-70  · Morning /60  · Continue to monitor BP  · Continue amlodipine 5 mg daily

## 2023-02-02 ENCOUNTER — NURSING HOME VISIT (OUTPATIENT)
Dept: GERIATRICS | Facility: OTHER | Age: 88
End: 2023-02-02

## 2023-02-02 DIAGNOSIS — R29.6 FREQUENT FALLS: Primary | ICD-10-CM

## 2023-02-02 DIAGNOSIS — U07.1 COVID-19: ICD-10-CM

## 2023-02-02 DIAGNOSIS — R26.2 AMBULATORY DYSFUNCTION: ICD-10-CM

## 2023-02-02 DIAGNOSIS — F41.9 ANXIETY: Chronic | ICD-10-CM

## 2023-02-02 DIAGNOSIS — N18.32 STAGE 3B CHRONIC KIDNEY DISEASE (HCC): Chronic | ICD-10-CM

## 2023-02-03 NOTE — PROGRESS NOTES
[address], [address] [place] [address], [address] [place] [address]  [phone]    NAME: Mikey Leahy  AGE: 80 y o  SEX: female    Progress Note    Location: [facility]   POS: 31 (SNF)    Assessment/Plan:    Frequent falls  Multifactorial in the setting of acute and chronic medical conditions   With known history of falls  PT OT  Fall and safety precautions  Continue supportive care  SW following for d/c planning    Ambulatory dysfunction  Multifactorial in the setting of acute and chronic med conditions  PT OT  Continue fall and safety precautions  Optimize acute and chronic med conditions  OOB for meals as tolerated     COVID-19  Patient tested positive for COVID-19 on 1223  Chest x-ray was negative for acute cardiopulmonary disease  Patient remained asymptomatic, did not require any definitive treatment of supplemental oxygen  Remains on RA  SaO2 95% at rest RA  Continue to monitor respiratory status     Anxiety  During my visit, mood stable   Sleeping with no difficulties  C/w clonazepam 0 5 mg every 8rs PRN for anxiety and Buspar 5 mg daily   Remains on Melatonin 3 mg QHS  Encourage pt to participate in group activities when appropriate     Stage 3b chronic kidney disease ([facility] [address] )  Lab Results   Component Value Date    EGFR 62 12/29/2022    EGFR 51 12/28/2022    EGFR 55 12/25/2022    CREATININE 0 83 12/29/2022    CREATININE 0 97 12/28/2022    CREATININE 0 92 12/25/2022   Recent BUN/Creatinine stable with eGFR 65  All lytes wnl  Avoid nephrotoxins and hypotension  Renal dose meds for eGFR <30  F/w with nephrology post d/c   Monitor renal functions periodically     Chief complaint / Reason for visit: STR f/u    History of Present Illness:  80-year old female seen and examined for STR f/u  Received pt lying in bed, resting  At the time of my evaluation pt reports pain in b/l legs  Per pt is chronic in nature  Available tramadol as needed with relief  Reports consuming between 50-75% for all meals  Denies N/V/C/D  Noted with left leg weakness  BLE no edema  Free of chest pain or palpitations  Per nursing on other concerns or issues at this time  Review of Systems:  Per history of present illness, all other systems reviewed and negative  Other than those noted in HPI  HISTORY:  Medical Hx: Reviewed, unchanged  Family Hx: Reviewed, unchanged  Soc Hx: Reviewed,  unchanged    ALLERGY: Reviewed, unchanged  No Known Allergies     PHYSICAL EXAM:  Vital Signs: 110/70, 72, respiration 18, temperature 97 8, O2 sats 95% room air  Weight: 107 3 pounds    General: appear weak and frail   Head: Atraumatic  Normocephalic  Eye Exam: anicteric sclera, no discharge, PERRLA, No injection  Oral Exam: moist mucous membranes, no buccaloropharyngeal erythema, palatine tonsils WNL  Neck Exam: no anterior cervical lymphadenopathy noted, neck supple  Cardiovascular: regular rate, regular rhythm, no murmurs, rubs, or gallops  Pulmonary: clear, no wheeze, no rhonchi, no rales  No chest tenderness  Abdominal: soft, non-tender, nondistended, bowel sounds audible x 4 quadrants  : Non distended bladder  B/B Incontinent   Extremities and skin: no edema noted, No rashes  Pale   Neurological: alert, cooperative and responsive, Oriented to self and place  Noted left leg weakness     Laboratory results / Imaging reviewed: Hard copy/ies in medical chart: Reviewed from [facility]    1/24/23  Hemoglobin 9 8, hematocrit 29 3, platelets 466, WBC 8 0    1/16/23  BUN 22, creatinine 0 86, sodium 141, potassium 4 5, calcium 9 0, EGFR 65, hemoglobin 11 1, hematocrit 3 0, WBC 10 0    Current Medications: All medications reviewed and updated in Nursing Home Chart reviewed from [facility]    Please note:  Voice-recognition software may have been used in the preparation of this document  Occasional wrong word or "sound-alike" substitutions may have occurred due to the inherent limitations of voice recognition software   Interpretation should be guided by context      Anand Savage, [facility]  2/2/2023

## 2023-02-06 ENCOUNTER — NURSING HOME VISIT (OUTPATIENT)
Dept: GERIATRICS | Facility: OTHER | Age: 88
End: 2023-02-06

## 2023-02-06 VITALS
TEMPERATURE: 97.2 F | SYSTOLIC BLOOD PRESSURE: 122 MMHG | HEART RATE: 70 BPM | DIASTOLIC BLOOD PRESSURE: 64 MMHG | RESPIRATION RATE: 18 BRPM

## 2023-02-06 DIAGNOSIS — I16.0 HYPERTENSIVE URGENCY: ICD-10-CM

## 2023-02-06 DIAGNOSIS — R26.2 AMBULATORY DYSFUNCTION: Primary | ICD-10-CM

## 2023-02-06 DIAGNOSIS — M19.90 ARTHRITIS: ICD-10-CM

## 2023-02-06 DIAGNOSIS — G47.00 INSOMNIA, UNSPECIFIED TYPE: ICD-10-CM

## 2023-02-06 DIAGNOSIS — F41.9 ANXIETY: Chronic | ICD-10-CM

## 2023-02-06 NOTE — ASSESSMENT & PLAN NOTE
Multifactorial in the setting of acute and chronic medical conditions   With known history of falls  PT OT  Fall and safety precautions  Continue supportive care  SW following for d/c planning

## 2023-02-06 NOTE — ASSESSMENT & PLAN NOTE
Multifactorial in the setting of acute and chronic med conditions  PT OT  Continue fall and safety precautions  Optimize acute and chronic med conditions  OOB for meals as tolerated

## 2023-02-06 NOTE — PROGRESS NOTES
[facility]  [address]  [phone]  Chetan Fuller  Code 31 (STR)      NAME: An Sevilla  AGE: 80 y o  SEX: female CODE STATUS: CPR    DATE OF ENCOUNTER: 2/6/2023    Assessment and Plan     1  Ambulatory dysfunction  Assessment & Plan:  Multifactorial  Continue PT/OT for strength training  Continue Fall/safety precautions  Ensure adequate nutrition/hydration   Monitor CBC/BMP    following for d/c planning       2  Insomnia, unspecified type  Assessment & Plan:    · Patient states she slept well last night  · Maintain sleep/wake cycle  · Limit night time interruptions  · continue melatonin 3 mg at HS      3  Arthritis  Assessment & Plan:  · Patient with history of chronic joint pain from arthritis  · Continue PT/OT for strength training/stability  · Maintain fall/safety precautions  · Continue tramadol 25 mg every 8 hours   · Continue Tylenol 600 mg every 6 hours as needed pain      4  Anxiety  Assessment & Plan:  · Mood stable  · Continue BuSpar 5 mg daily  · Continue melatonin 3 mg at at bedtime  · Encourage out of bed with meals and participation in group activities      5  Hypertensive urgency  Assessment & Plan:  · Patients BP was elevated during recent hospitalization, SBP greater than 200 mmHg  · BP trending 120/70- 133/78   · morning /64   · continue to monitor BP  · Continue amlodipine 5 mg daily         All medications and routine orders were reviewed and updated as needed  Chief Complaint     STR follow up visit  Patient's care was coordinated with nursing facility staff  Recent vitals, labs, and updated medications were review on [facility] system in facility        Past Medical and Surgical History      Past Medical History:   Diagnosis Date   • Anxiety    • Arthritis    • Hyperlipidemia    • Hypertension    • Hyperthyroidism      Past Surgical History:   Procedure Laterality Date   • ABDOMINAL SURGERY      peritoneal    • APPENDECTOMY • HYSTERECTOMY       No Known Allergies       History of Present Illness     HPI  Sawyer Perez is an 31-year-old female, she is a STR patient of [facility] since 12/29/22  Past Medical Hx including but not limited to HLD, HTN, hyperthyroidism, arthritis, anxiety, CKD  She was seen in collaboration with nursing for medical management and acute visit for urinary retention  Hospital Course  Patient was admitted to the hospital 12/23/22 for falls and increased confusion  Patient tested positive for COVID on admission  CT of head showed meningioma that was unchanged from prior study  Chest x-ray was negative for acute cardiopulmonary disease  Altered mental status was thought to be from metabolic encephalopathy likely due to COVID pneumonia and sedating medications  Home dose of Klonopin was reduced  Patient was recommended for rehab  Rehab course   Gauri Choi was seen and examined at bedside today  Patient is a reliable historian and is alert and oriented x4  On exam patient is resting in bed  She does not appear to be in any distress and says she is feeling good today  She continues to have chronic arthritis pain in her hands and legs for which she takes tramadol  She says she slept well last night and has been participating in therapy  On exam she denies CP/SOB/N/V/C/D  Denies lightheadedness, dizziness, headaches, vision changes  She denies any bowel or bladder issues  Per review of SNF records, Patient is eating 3 meals per day, consuming 50-75 %  Last documented BM 2/6/23  No other concerns from nursing at this time  The patient's allergies, past medical, surgical, social and family history were reviewed and unchanged  Review of Systems     Review of Systems   Constitutional: Negative for activity change, appetite change, chills and fever  HENT: Negative  Negative for congestion  Eyes: Negative  Respiratory: Negative  Negative for cough, shortness of breath and wheezing  Cardiovascular: Negative  Negative for chest pain, palpitations and leg swelling  Gastrointestinal: Negative for abdominal distention, abdominal pain, constipation, diarrhea, nausea and vomiting  Endocrine: Negative  Genitourinary: Negative  Negative for difficulty urinating and dysuria  Musculoskeletal: Positive for arthralgias and gait problem  Skin: Negative  Allergic/Immunologic: Negative  Neurological: Negative for dizziness and headaches  Hematological: Negative  Psychiatric/Behavioral: Negative for dysphoric mood and sleep disturbance  Objective     Vitals:   Vitals:    02/06/23 1030   BP: 122/64   Pulse: 70   Resp: 18   Temp: (!) 97 2 °F (36 2 °C)         Physical Exam  Vitals and nursing note reviewed  Constitutional:       General: She is not in acute distress  Appearance: Normal appearance  She is not ill-appearing  HENT:      Head: Normocephalic and atraumatic  Nose: No congestion  Mouth/Throat:      Mouth: Mucous membranes are moist    Eyes:      Conjunctiva/sclera: Conjunctivae normal    Cardiovascular:      Rate and Rhythm: Normal rate and regular rhythm  Heart sounds: Normal heart sounds  Pulmonary:      Effort: Pulmonary effort is normal       Breath sounds: Normal breath sounds  No wheezing  Abdominal:      General: Bowel sounds are normal  There is no distension  Palpations: Abdomen is soft  Tenderness: There is no abdominal tenderness  Musculoskeletal:      Right lower leg: No edema  Left lower leg: No edema  Skin:     General: Skin is warm  Capillary Refill: Capillary refill takes more than 3 seconds  Neurological:      Mental Status: She is alert and oriented to person, place, and time  Motor: Weakness present  Gait: Gait abnormal    Psychiatric:         Behavior: Behavior normal          Thought Content:  Thought content normal          Pertinent Laboratory/Diagnostic Studies:   Reviewed in facility chart-stable      Current Medications   Medications reviewed and updated see facility [facility] for details  Current Outpatient Medications:   •  acetaminophen (TYLENOL) 325 mg tablet, Take 650 mg by mouth every 6 (six) hours as needed, Disp: , Rfl:   •  amLODIPine (NORVASC) 5 mg tablet, Take 1 tablet (5 mg total) by mouth daily, Disp: , Rfl: 0  •  aspirin 81 MG tablet, Take 1 tablet by mouth daily, Disp: , Rfl:   •  bisacodyl (DULCOLAX) 10 mg suppository, Insert 10 mg into the rectum daily as needed, Disp: , Rfl:   •  busPIRone (BUSPAR) 5 mg tablet, Take 1 tablet (5 mg total) by mouth 2 (two) times a day, Disp: 60 tablet, Rfl: 5  •  clonazePAM (KlonoPIN) 0 5 mg tablet, Take 0 5 mg by mouth 3 (three) times a day as needed, Disp: , Rfl:   •  docusate sodium (COLACE) 100 mg capsule, Take 100 mg by mouth 2 (two) times a day, Disp: , Rfl:   •  melatonin 3 mg, Take 3 mg by mouth daily at bedtime, Disp: , Rfl:   •  traMADol (ULTRAM) 50 mg tablet, Take 25 mg by mouth every 8 (eight) hours, Disp: , Rfl:        Please note:  Voice-recognition software may have been used in the preparation of this document  Occasional wrong word or "sound-alike" substitutions may have occurred due to the inherent limitations of voice recognition software  Interpretation should be guided by context           [facility] DEMARCO Alvarado  2/6/2023  4:02 PM

## 2023-02-06 NOTE — ASSESSMENT & PLAN NOTE
Lab Results   Component Value Date    EGFR 62 12/29/2022    EGFR 51 12/28/2022    EGFR 55 12/25/2022    CREATININE 0 83 12/29/2022    CREATININE 0 97 12/28/2022    CREATININE 0 92 12/25/2022   Recent BUN/Creatinine stable with eGFR 65  All lytes wnl  Avoid nephrotoxins and hypotension  Renal dose meds for eGFR <30  F/w with nephrology post d/c   Monitor renal functions periodically

## 2023-02-06 NOTE — ASSESSMENT & PLAN NOTE
· Patients BP was elevated during recent hospitalization, SBP greater than 200 mmHg    · BP trending 120/70- 133/78   · morning /64   · continue to monitor BP  · Continue amlodipine 5 mg daily

## 2023-02-06 NOTE — ASSESSMENT & PLAN NOTE
· Mood stable  · Continue BuSpar 5 mg daily  · Continue melatonin 3 mg at at bedtime  · Encourage out of bed with meals and participation in group activities

## 2023-02-06 NOTE — ASSESSMENT & PLAN NOTE
Lab Results   Component Value Date    EGFR 62 12/29/2022    EGFR 51 12/28/2022    EGFR 55 12/25/2022    CREATININE 0 83 12/29/2022    CREATININE 0 97 12/28/2022    CREATININE 0 92 12/25/2022

## 2023-02-06 NOTE — ASSESSMENT & PLAN NOTE
· Patient states she slept well last night  · Maintain sleep/wake cycle  · Limit night time interruptions  · continue melatonin 3 mg at HS

## 2023-02-06 NOTE — ASSESSMENT & PLAN NOTE
Patient tested positive for COVID-19 on 1223  Chest x-ray was negative for acute cardiopulmonary disease  Patient remained asymptomatic, did not require any definitive treatment of supplemental oxygen  Remains on RA  SaO2 95% at rest RA   Continue to monitor respiratory status

## 2023-02-06 NOTE — ASSESSMENT & PLAN NOTE
During my visit, mood stable   Sleeping with no difficulties  C/w clonazepam 0 5 mg every 8rs PRN for anxiety and Buspar 5 mg daily   Remains on Melatonin 3 mg QHS  Encourage pt to participate in group activities when appropriate

## 2023-02-08 ENCOUNTER — NURSING HOME VISIT (OUTPATIENT)
Dept: GERIATRICS | Facility: OTHER | Age: 88
End: 2023-02-08

## 2023-02-08 VITALS
OXYGEN SATURATION: 95 % | TEMPERATURE: 97.8 F | SYSTOLIC BLOOD PRESSURE: 151 MMHG | HEART RATE: 69 BPM | DIASTOLIC BLOOD PRESSURE: 76 MMHG | RESPIRATION RATE: 18 BRPM

## 2023-02-08 DIAGNOSIS — G47.00 INSOMNIA, UNSPECIFIED TYPE: ICD-10-CM

## 2023-02-08 DIAGNOSIS — F41.9 ANXIETY: Chronic | ICD-10-CM

## 2023-02-08 DIAGNOSIS — M19.90 ARTHRITIS: Primary | ICD-10-CM

## 2023-02-08 DIAGNOSIS — R26.2 AMBULATORY DYSFUNCTION: ICD-10-CM

## 2023-02-08 DIAGNOSIS — I16.0 HYPERTENSIVE URGENCY: ICD-10-CM

## 2023-02-08 NOTE — ASSESSMENT & PLAN NOTE
· Patient states she slept well for the last 2 night  · Maintain sleep/wake cycle  · Limit night time interruptions  · continue melatonin 3 mg at HS

## 2023-02-08 NOTE — PROGRESS NOTES
[facility]  [address]  [phone]  Carl Ramirez  Code 31 (STR)      NAME: Stella Trivedi  AGE: 80 y o  SEX: female CODE STATUS: CPR    DATE OF ENCOUNTER: 2/8/2023    Assessment and Plan     1  Arthritis  Assessment & Plan:  · Patient with history of chronic joint pain from arthritis  · Continue PT/OT for strength training/stability  · Maintain fall/safety precautions  · Continue tramadol 25 mg every 8 hours   · Continue Tylenol 600 mg every 6 hours as needed pain      2  Ambulatory dysfunction  Assessment & Plan:  Multifactorial  Continue PT/OT for strength training  Continue Fall/safety precautions  Ensure adequate nutrition/hydration   Monitor CBC/BMP    following for d/c planning       3  Hypertensive urgency  Assessment & Plan:  · Patients BP was elevated during recent hospitalization, SBP greater than 200 mmHg  · BP trending 110/70- 151/76  · morning /76  · continue to monitor BP  · Continue amlodipine 5 mg daily      4  Insomnia, unspecified type  Assessment & Plan:    · Patient states she slept well for the last 2 night  · Maintain sleep/wake cycle  · Limit night time interruptions  · continue melatonin 3 mg at HS      5  Anxiety  Assessment & Plan:  · Mood stable  · Continue BuSpar 5 mg daily  · Continue melatonin 3 mg at at bedtime  · Encourage out of bed with meals and participation in group activities         All medications and routine orders were reviewed and updated as needed  Chief Complaint     STR follow up visit  Patient's care was coordinated with nursing facility staff  Recent vitals, labs, and updated medications were review on [facility] system in facility        Past Medical and Surgical History      Past Medical History:   Diagnosis Date   • Anxiety    • Arthritis    • Hyperlipidemia    • Hypertension    • Hyperthyroidism      Past Surgical History:   Procedure Laterality Date   • ABDOMINAL SURGERY      peritoneal    • APPENDECTOMY     • HYSTERECTOMY       No Known Allergies       History of Present Illness     HPI  Jewell Oliva is an 51-year-old female, she is a STR patient of [facility] since 12/29/22  Past Medical Hx including but not limited to HLD, HTN, hyperthyroidism, arthritis, anxiety, CKD  She was seen in collaboration with nursing for medical management and acute visit for urinary retention  Hospital Course  Patient was admitted to the hospital 12/23/22 for falls and increased confusion  Patient tested positive for COVID on admission  CT of head showed meningioma that was unchanged from prior study  Chest x-ray was negative for acute cardiopulmonary disease  Altered mental status was thought to be from metabolic encephalopathy likely due to COVID pneumonia and sedating medications  Home dose of Klonopin was reduced  Patient was recommended for rehab  Rehab course   Aidan Oakley was seen and examined at bedside today  Patient is a reliable historian and is alert and oriented x4  On exam patient is resting in bed  She says she is feeling good today and was able to get some sleep for the last 2 nights  She continues to have chronic arthritis pain in her hands and legs for which she takes tramadol  She offers no other complaints  On exam she denies CP/SOB/N/V/C/D  Denies lightheadedness, dizziness, headaches, vision changes  She denies any bowel or bladder issues  Per review of SNF records, Patient is eating 3 meals per day, consuming 50-75 %  Last documented BM 2/7/23  No other concerns from nursing at this time  The patient's allergies, past medical, surgical, social and family history were reviewed and unchanged  Review of Systems     Review of Systems   Constitutional: Negative for activity change, appetite change, chills and fever  HENT: Negative  Negative for congestion  Eyes: Negative  Respiratory: Negative  Negative for cough, shortness of breath and wheezing  Cardiovascular: Negative  Negative for chest pain, palpitations and leg swelling  Gastrointestinal: Negative for abdominal distention, abdominal pain, constipation, diarrhea, nausea and vomiting  Endocrine: Negative  Genitourinary: Negative  Negative for difficulty urinating and dysuria  Musculoskeletal: Positive for arthralgias and gait problem  Skin: Negative  Allergic/Immunologic: Negative  Neurological: Negative for dizziness and headaches  Hematological: Negative  Psychiatric/Behavioral: Negative for dysphoric mood and sleep disturbance  Objective     Vitals:   Vitals:    02/08/23 1109   BP: 151/76   Pulse: 69   Resp: 18   Temp: 97 8 °F (36 6 °C)   SpO2: 95%         Physical Exam  Vitals and nursing note reviewed  Constitutional:       General: She is not in acute distress  Appearance: Normal appearance  She is not ill-appearing  HENT:      Head: Normocephalic and atraumatic  Nose: No congestion  Mouth/Throat:      Mouth: Mucous membranes are moist    Eyes:      Conjunctiva/sclera: Conjunctivae normal    Cardiovascular:      Rate and Rhythm: Normal rate and regular rhythm  Heart sounds: Normal heart sounds  Pulmonary:      Effort: Pulmonary effort is normal       Breath sounds: Normal breath sounds  No wheezing  Abdominal:      General: Bowel sounds are normal  There is no distension  Palpations: Abdomen is soft  Tenderness: There is no abdominal tenderness  Musculoskeletal:      Right lower leg: No edema  Left lower leg: No edema  Skin:     General: Skin is warm  Capillary Refill: Capillary refill takes more than 3 seconds  Neurological:      Mental Status: She is alert and oriented to person, place, and time  Motor: Weakness present  Gait: Gait abnormal    Psychiatric:         Behavior: Behavior normal          Thought Content:  Thought content normal          Pertinent Laboratory/Diagnostic Studies:   Reviewed in facility chart-stable      Current Medications   Medications reviewed and updated see facility [facility] for details  Current Outpatient Medications:   •  acetaminophen (TYLENOL) 325 mg tablet, Take 650 mg by mouth every 6 (six) hours as needed, Disp: , Rfl:   •  amLODIPine (NORVASC) 5 mg tablet, Take 1 tablet (5 mg total) by mouth daily, Disp: , Rfl: 0  •  aspirin 81 MG tablet, Take 1 tablet by mouth daily, Disp: , Rfl:   •  bisacodyl (DULCOLAX) 10 mg suppository, Insert 10 mg into the rectum daily as needed, Disp: , Rfl:   •  busPIRone (BUSPAR) 5 mg tablet, Take 1 tablet (5 mg total) by mouth 2 (two) times a day, Disp: 60 tablet, Rfl: 5  •  clonazePAM (KlonoPIN) 0 5 mg tablet, Take 0 5 mg by mouth 3 (three) times a day as needed, Disp: , Rfl:   •  docusate sodium (COLACE) 100 mg capsule, Take 100 mg by mouth 2 (two) times a day, Disp: , Rfl:   •  melatonin 3 mg, Take 3 mg by mouth daily at bedtime, Disp: , Rfl:   •  traMADol (ULTRAM) 50 mg tablet, Take 25 mg by mouth every 8 (eight) hours, Disp: , Rfl:        Please note:  Voice-recognition software may have been used in the preparation of this document  Occasional wrong word or "sound-alike" substitutions may have occurred due to the inherent limitations of voice recognition software  Interpretation should be guided by context           [facility] DEMARCO Alvarado  2/8/2023  4:11 PM

## 2023-02-08 NOTE — ASSESSMENT & PLAN NOTE
· Patients BP was elevated during recent hospitalization, SBP greater than 200 mmHg    · BP trending 110/70- 151/76  · morning /76  · continue to monitor BP  · Continue amlodipine 5 mg daily

## 2023-02-13 ENCOUNTER — NURSING HOME VISIT (OUTPATIENT)
Dept: GERIATRICS | Facility: OTHER | Age: 88
End: 2023-02-13

## 2023-02-13 VITALS
TEMPERATURE: 97.4 F | RESPIRATION RATE: 18 BRPM | SYSTOLIC BLOOD PRESSURE: 150 MMHG | DIASTOLIC BLOOD PRESSURE: 64 MMHG | OXYGEN SATURATION: 95 % | HEART RATE: 70 BPM

## 2023-02-13 DIAGNOSIS — K59.00 CONSTIPATION, UNSPECIFIED CONSTIPATION TYPE: ICD-10-CM

## 2023-02-13 DIAGNOSIS — R26.2 AMBULATORY DYSFUNCTION: Primary | ICD-10-CM

## 2023-02-13 DIAGNOSIS — I16.0 HYPERTENSIVE URGENCY: ICD-10-CM

## 2023-02-13 DIAGNOSIS — F41.9 ANXIETY: Chronic | ICD-10-CM

## 2023-02-13 DIAGNOSIS — M19.90 ARTHRITIS: ICD-10-CM

## 2023-02-13 NOTE — ASSESSMENT & PLAN NOTE
· Patients BP was elevated during recent hospitalization, SBP greater than 200 mmHg    · BP trending 102//63  · morning /64  · continue to monitor BP  · Continue amlodipine 5 mg daily

## 2023-02-13 NOTE — PROGRESS NOTES
[facility]  [address]  [phone]  Latoya Gallegos  Code 31 (STR)        NAME: Paul Cornelius  AGE: 80 y o  SEX: female CODE STATUS: CPR    DATE OF ENCOUNTER: 2/13/2023    Assessment and Plan     1  Ambulatory dysfunction  Assessment & Plan:  Multifactorial  Continue PT/OT for strength training  Continue Fall/safety precautions  Ensure adequate nutrition/hydration   Monitor CBC/BMP    following for d/c planning       2  Arthritis  Assessment & Plan:  · Patient with history of chronic joint pain from arthritis  · Continue PT/OT for strength training/stability  · Maintain fall/safety precautions  · Continue tramadol 25 mg every 8 hours   · Continue Tylenol 650 mg every 6 hours as needed pain      3  Anxiety  Assessment & Plan:  · Mood stable  · Continue BuSpar 5 mg daily  · Continue melatonin 3 mg at at bedtime  · Encourage out of bed with meals and participation in group activities      4  Hypertensive urgency  Assessment & Plan:  · Patients BP was elevated during recent hospitalization, SBP greater than 200 mmHg  · BP trending 102//63  · morning /64  · continue to monitor BP  · Continue amlodipine 5 mg daily      5  Constipation, unspecified constipation type  Assessment & Plan:  · Patient complains of constipation  · Last documented BM 2/10/2023  · Patient has been taking Colace 100 mg twice daily, continue  · Will order MiraLAX daily as needed  · Encourage adequate p o  hydration/nutrition         All medications and routine orders were reviewed and updated as needed  Chief Complaint     STR follow up visit  Patient's care was coordinated with nursing facility staff  Recent vitals, labs, and updated medications were review on [facility] in facility        Past Medical and Surgical History      Past Medical History:   Diagnosis Date   • Anxiety    • Arthritis    • Hyperlipidemia    • Hypertension    • Hyperthyroidism      Past Surgical History:   Procedure Laterality Date   • ABDOMINAL SURGERY      peritoneal    • APPENDECTOMY     • HYSTERECTOMY       No Known Allergies       History of Present Illness     MARIAN Gardner is an 80-year-old female, she is a STR patient of [facility] since 12/29/22  Past Medical Hx including but not limited to HLD, HTN, hyperthyroidism, arthritis, anxiety, CKD  She was seen in collaboration with nursing for medical management and acute visit for urinary retention  Hospital Course  Patient was admitted to the hospital 12/23/22 for falls and increased confusion  Patient tested positive for COVID on admission  CT of head showed meningioma that was unchanged from prior study  Chest x-ray was negative for acute cardiopulmonary disease  Altered mental status was thought to be from metabolic encephalopathy likely due to COVID pneumonia and sedating medications  Home dose of Klonopin was reduced  Patient was recommended for rehab  Rehab course   Eleni Lopez was seen and examined at bedside today  Patient is a reliable historian and is alert and oriented x4  On exam patient is sitting in her chair at side of the bed and does not appear to be in any distress  She continues to have chronic arthritis pain in her hands and legs for which she takes tramadol  She complains of constipation and has been taking Colace twice daily, will order MiraLAX daily as needed  She denies CP/SOB/N/V/C/D  Denies lightheadedness, dizziness, headaches, vision changes  She denies any bowel or bladder issues  Per review of SNF records, Patient is eating 3 meals per day, consuming  %  Last documented BM 2/10/23  No other concerns from nursing at this time  The patient's allergies, past medical, surgical, social and family history were reviewed and unchanged  Review of Systems     Review of Systems   Constitutional: Negative for activity change, appetite change, chills and fever  HENT: Negative  Negative for congestion      Eyes: Negative  Respiratory: Negative  Negative for cough, shortness of breath and wheezing  Cardiovascular: Negative  Negative for chest pain, palpitations and leg swelling  Gastrointestinal: Positive for constipation  Negative for abdominal distention, abdominal pain, diarrhea, nausea and vomiting  Endocrine: Negative  Genitourinary: Negative  Negative for difficulty urinating and dysuria  Musculoskeletal: Positive for arthralgias and gait problem  Skin: Negative  Allergic/Immunologic: Negative  Neurological: Negative for dizziness and headaches  Hematological: Negative  Psychiatric/Behavioral: Negative for dysphoric mood and sleep disturbance  Objective     Vitals:   Vitals:    02/13/23 0959   BP: 150/64   Pulse: 70   Resp: 18   Temp: (!) 97 4 °F (36 3 °C)   SpO2: 95%         Physical Exam  Vitals and nursing note reviewed  Constitutional:       General: She is not in acute distress  Appearance: Normal appearance  She is not ill-appearing  HENT:      Head: Normocephalic and atraumatic  Nose: No congestion  Mouth/Throat:      Mouth: Mucous membranes are moist    Eyes:      Conjunctiva/sclera: Conjunctivae normal    Cardiovascular:      Rate and Rhythm: Normal rate and regular rhythm  Heart sounds: Normal heart sounds  Pulmonary:      Effort: Pulmonary effort is normal       Breath sounds: Normal breath sounds  No wheezing  Abdominal:      General: Bowel sounds are normal  There is distension  Palpations: Abdomen is soft  Tenderness: There is no abdominal tenderness  Comments: Patient complains of constipation, last BM 2/10/2023, positive bowel sounds x4   Musculoskeletal:      Right lower leg: No edema  Left lower leg: No edema  Skin:     General: Skin is warm  Capillary Refill: Capillary refill takes more than 3 seconds  Neurological:      Mental Status: She is alert and oriented to person, place, and time        Motor: Weakness present  Gait: Gait abnormal    Psychiatric:         Behavior: Behavior normal          Thought Content: Thought content normal          Pertinent Laboratory/Diagnostic Studies:   Reviewed in facility chart-stable      Current Medications   Medications reviewed and updated see facility [facility] for details  Current Outpatient Medications:   •  acetaminophen (TYLENOL) 325 mg tablet, Take 650 mg by mouth every 6 (six) hours as needed, Disp: , Rfl:   •  amLODIPine (NORVASC) 5 mg tablet, Take 1 tablet (5 mg total) by mouth daily, Disp: , Rfl: 0  •  aspirin 81 MG tablet, Take 1 tablet by mouth daily, Disp: , Rfl:   •  bisacodyl (DULCOLAX) 10 mg suppository, Insert 10 mg into the rectum daily as needed, Disp: , Rfl:   •  busPIRone (BUSPAR) 5 mg tablet, Take 1 tablet (5 mg total) by mouth 2 (two) times a day, Disp: 60 tablet, Rfl: 5  •  clonazePAM (KlonoPIN) 0 5 mg tablet, Take 0 5 mg by mouth 3 (three) times a day as needed, Disp: , Rfl:   •  docusate sodium (COLACE) 100 mg capsule, Take 100 mg by mouth 2 (two) times a day, Disp: , Rfl:   •  melatonin 3 mg, Take 3 mg by mouth daily at bedtime, Disp: , Rfl:   •  traMADol (ULTRAM) 50 mg tablet, Take 25 mg by mouth every 8 (eight) hours, Disp: , Rfl:        Please note:  Voice-recognition software may have been used in the preparation of this document  Occasional wrong word or "sound-alike" substitutions may have occurred due to the inherent limitations of voice recognition software  Interpretation should be guided by context           [facility] DEMARCO Alvarado  2/13/2023  4:11 PM

## 2023-02-13 NOTE — ASSESSMENT & PLAN NOTE
· Patient with history of chronic joint pain from arthritis  · Continue PT/OT for strength training/stability  · Maintain fall/safety precautions  · Continue tramadol 25 mg every 8 hours   · Continue Tylenol 650 mg every 6 hours as needed pain

## 2023-02-13 NOTE — ASSESSMENT & PLAN NOTE
· Patient complains of constipation  · Last documented BM 2/10/2023  · Patient has been taking Colace 100 mg twice daily, continue  · Will order MiraLAX daily as needed  · Encourage adequate p o  hydration/nutrition

## 2023-02-20 ENCOUNTER — NURSING HOME VISIT (OUTPATIENT)
Dept: GERIATRICS | Facility: OTHER | Age: 88
End: 2023-02-20

## 2023-02-20 DIAGNOSIS — G93.40 ACUTE ENCEPHALOPATHY: ICD-10-CM

## 2023-02-20 DIAGNOSIS — F41.9 ANXIETY: Chronic | ICD-10-CM

## 2023-02-20 DIAGNOSIS — R33.9 URINARY RETENTION: ICD-10-CM

## 2023-02-20 DIAGNOSIS — R26.2 AMBULATORY DYSFUNCTION: ICD-10-CM

## 2023-02-20 DIAGNOSIS — M19.90 ARTHRITIS: ICD-10-CM

## 2023-02-20 DIAGNOSIS — R29.6 FREQUENT FALLS: ICD-10-CM

## 2023-02-20 DIAGNOSIS — N18.32 STAGE 3B CHRONIC KIDNEY DISEASE (HCC): Chronic | ICD-10-CM

## 2023-02-20 DIAGNOSIS — T14.8XXA ABRASION: ICD-10-CM

## 2023-02-20 DIAGNOSIS — G47.00 INSOMNIA, UNSPECIFIED TYPE: ICD-10-CM

## 2023-02-20 DIAGNOSIS — E05.90 HYPERTHYROIDISM: ICD-10-CM

## 2023-02-20 DIAGNOSIS — U07.1 COVID-19: Primary | ICD-10-CM

## 2023-02-20 DIAGNOSIS — I16.0 HYPERTENSIVE URGENCY: ICD-10-CM

## 2023-02-20 DIAGNOSIS — Z97.8 FOLEY CATHETER IN PLACE: ICD-10-CM

## 2023-02-20 DIAGNOSIS — D32.9 MENINGIOMA (HCC): Chronic | ICD-10-CM

## 2023-02-20 RX ORDER — POLYETHYLENE GLYCOL 3350 17 G/17G
17 POWDER, FOR SOLUTION ORAL DAILY
COMMUNITY

## 2023-02-20 NOTE — ASSESSMENT & PLAN NOTE
With known history of chronic joints pain for arthritis  PT OT   Fall and safety precautions  C/w Tramadol 25 mg Q 8 hrs   Remains on Acetaminophen 650 mg every 6 hours PRN

## 2023-02-20 NOTE — ASSESSMENT & PLAN NOTE
Mood stable   C/w Buspar 5 mg daily  Remains on Melatonin 3 mg QHS  Encourage oob for meals   Encourage participation in group activities in facility as appropriate

## 2023-02-20 NOTE — ASSESSMENT & PLAN NOTE
BP was elevated during recent hospitalization, SBP > 200 mmHg  BP trending 102s - 156s / 57s - 76s : bp this /70  continue to monitor Bps  C/w Amlodipine 5 mg daily

## 2023-02-20 NOTE — ASSESSMENT & PLAN NOTE
Pt was evaluated by STR Wound NP for R buttock abrasion   Continue daily and PRN wound car with Z guard for pressure ulcer prevention and moisture management   Continue to offload  Monitor

## 2023-02-20 NOTE — ASSESSMENT & PLAN NOTE
Lab Results   Component Value Date    EGFR 62 12/29/2022    EGFR 51 12/28/2022    EGFR 55 12/25/2022    CREATININE 0 83 12/29/2022    CREATININE 0 97 12/28/2022    CREATININE 0 92 12/25/2022 1/16/23- BUN 22, creatinine 0 86, eGFR 65  Avoid nephrotoxins and hypotension   Renal dose medications for GFR <30  continue adequate oral hydration   Monitor renal function periodically

## 2023-02-20 NOTE — PROGRESS NOTES
[facility]  Maenderjanina Zhangenderashish [address]  [phone]  DISCHARGE SUMMARY  POS: STR 31  Facility: [facility]    NAME: Renetta Dias  AGE: 80 y o  SEX: female  DATE OF ADMISSION: 12/29/2022   DATE OF DISCHARGE: 2/20/2023  DISCHARGE DISPOSITION: [facility]    Reason for admission: Patient was admitted from [facility] for rehabilitation after hospitalization for increased confusion and falls    Admission Diagnoses: As mentioned above   Additional Problems:   Discharge Diagnoses: See problem list follow up recommendations below  COVID-19  Pt was recently hospitalized with acute toxic metabolic encephalopathy attriuted with acute toxic metabolic encephalopathy attributed to COVID-19 viral pneumonia and sedating medications  Klonopin dose was reduced   Patient tested positive for COVID-19 on 1223  Chest x-ray was negative for acute cardiopulmonary disease  Patient remained asymptomatic, did not require any definitive treatment of supplemental oxygen  Remains on RA   SaO2 95% at rest RA  Pt discharging to [facility]    Ambulatory dysfunction  Multifactorial in the setting of acute and chronic med conditions  PT OT  Continue fall and safety precautions  Optimize acute and chronic med conditions  OOB for meals as tolerated     Anxiety  Mood stable   C/w Buspar 5 mg daily  Remains on Melatonin 3 mg QHS  Encourage oob for meals   Encourage participation in group activities in facility as appropriate    Frequent falls  Multifactorial in the setting of acute and chronic medical conditions   With known history of falls  PT OT  Fall and safety precautions  Continue supportive care  SW following for d/c planning  Discharging today to [facility]     Alvarado catheter in place  Placed during recent hospitalization  Removed in STR, with no difficulties  On exam voiding with no difficulties  Incontinent of B/B  Assist x1 staff     Insomnia  reports no difficulties  Sleep/Wake cycle, limit nighttime interruptions  C/w Melatonin 3 mg QHS    Hypertensive urgency  BP was elevated during recent hospitalization, SBP > 200 mmHg  BP trending 102s - 156s / 57s - 76s : bp this /70  continue to monitor Bps  C/w Amlodipine 5 mg daily     Meningioma (HCC)  Pt has known history  Recent CR head revealed unchanged finding of meningioma  F u with neurology service    Acute encephalopathy  Pt recently hospitalized for altered mental status   Found to be COVID +  CT of head showed meningioma that was unchanged from prior study  At the time of my evaluation pt is alert and oriented x3  Continue supportive care at [facility]     Arthritis  With known history of chronic joints pain for arthritis  PT OT   Fall and safety precautions  C/w Tramadol 25 mg Q 8 hrs   Remains on Acetaminophen 650 mg every 6 hours PRN    Urinary retention  During recent hospitalization, discharge with donnelly catheter for urinary retention  Voiding trial failed  Bladder scan >450, Donnelly reinserted 1/12/23  Donnelly catheter was removed on 1/12/23  Incontinent of B/B  Today on exam voiding ok with no difficulties  If pt with new urinary difficulties recommend consult urology      Stage 3b chronic kidney disease [facility])  Lab Results   Component Value Date    EGFR 62 12/29/2022    EGFR 51 12/28/2022    EGFR 55 12/25/2022    CREATININE 0 83 12/29/2022    CREATININE 0 97 12/28/2022    CREATININE 0 92 12/25/2022 1/16/23- BUN 22, creatinine 0 86, eGFR 65  Avoid nephrotoxins and hypotension   Renal dose medications for GFR <30  continue adequate oral hydration   Monitor renal function periodically    Hyperthyroidism  Recent TSH and free T4 were normal limits as well   Patient is currently not on any therapy for above  F/u with PCP for management    Abrasion  Pt was evaluated by [facility] Wound NP for R buttock abrasion   Continue daily and PRN wound car with Z guard for pressure ulcer prevention and moisture management   Continue to offload  Monitor     Course of stay: Patient was admitted to [facility]  for rehabilitation following hospitalization  She was hospitalized on 12/23/2022 for altered mental status falls at home  Pt tested positive for COVID-19  Of note patient's son was hospitalized with COVID-19 infection  CT head revealed unchanged findings of meningioma  Chest x-ray was negative for acute cardiopulmonary disease  Patient remained asymptomatic with respect to COVID-19  Supportive care was provided  Patient did not require any supplemental oxygen  Altered mental status was attributed to metabolic encephalopathy likely due to COVID-19 viral pneumonia as well as sedating meds  Home dose of Klonopin was reduced  Patient was seen by PT OT services for frequent falls  She was subsequently discharged to [facility] rehab where she is being seen for posthospital admission  At the time of my evaluation patient is doing okay  Reports chronic arthritic pain, relief with acetaminophen  Denies /GI discomfort  Tolerating diet  Sleeping with no difficulties  Free of chest pain or palpitations  During the resident's stay at [facility], she received skilled nursing care, PT, OT, dietitian support, social service support and medical management  Pt is scheduled to be discharged to [address] [place] [address]  PT OT recommendations  Level Surfaces = CGA; Distance Level Surfaces = 30 feet; Assistive Device = Two-wheeled walker; Uneven Surfaces   = DNT  Stairs Stairs = CGA; Number of Stairs = 3 steps; Negotiates Stairs = Using handrails bilaterally  Original Signature: Electronically signed by [id number] F[address] [id number] 2/19/2023 01:47:11 P    Labs and testing performed during stay: reviewed from [facility]    Discharge Medications: See discharge medication list which was reviewed in [facility] and compared to facility orders for accuracy      Status at time of discharge exam: Stable    Today's Visit: 2/20/20232:45 PM    Subjective: No concerns or issues at this time    Review of systems: As per review of present illness all other systems reviewed and negative    Vitals: /70, P 80, R 18, Temp 97 4, O2 sats 95% RA, Weight 107 2 lb    Exam: Physical Exam  Constitutional:       Appearance: Normal appearance  She is not ill-appearing  Comments: Appears with generalized weakness and frailty   HENT:      Head: Normocephalic and atraumatic  Nose: Nose normal       Mouth/Throat:      Mouth: Mucous membranes are moist    Eyes:      General:         Right eye: No discharge  Left eye: No discharge  Cardiovascular:      Rate and Rhythm: Normal rate and regular rhythm  Pulses: Normal pulses  Heart sounds: Normal heart sounds  Pulmonary:      Effort: Pulmonary effort is normal  No respiratory distress  Breath sounds: Normal breath sounds  No wheezing  Abdominal:      General: Bowel sounds are normal  There is no distension  Palpations: Abdomen is soft  Tenderness: There is no abdominal tenderness  There is no guarding  Musculoskeletal:      Cervical back: Normal range of motion and neck supple  No rigidity or tenderness  Right lower leg: No edema  Left lower leg: No edema  Skin:     General: Skin is warm  Coloration: Skin is pale  Skin is not jaundiced  Findings: No bruising  Neurological:      General: No focal deficit present  Mental Status: She is alert  Cranial Nerves: No cranial nerve deficit  Motor: Weakness present  Gait: Gait abnormal    Psychiatric:         Mood and Affect: Mood normal      Discussion with patient/family and further instructions:  -Fall precautions  -Bleeding precautions  -Monitor for signs/symptoms of infection  -Medication list was reviewed    Follow-up Recommendations: Please follow-up with your primary care physician within 7-10 days of discharge to review medication changes and current status       Problem List Follow-up Recommendations:  F/U with PCP    I have spent >30 minutes with patient today in which greater than 50% of this time was spent in counseling/coordination of care regarding Diagnostic results, Prognosis, Risks and benefits of tx options, Instructions for management, Patient and family education, Importance of tx compliance, Risk factor reductions and Impressions  PCP made aware of discharge summary via epic communications  This note was completed in part utilizing [employer] direct voice recognition software  Grammatical errors, random word insertion, spelling mistakes, and incomplete sentences may be an occasional consequence of the system secondary to software limitations, ambient noise and hardware issues  At the time of dictation, efforts were made to edit, clarify and/or correct errors  Please read the chart carefully and recognize, using context, where substitutions have occurred  If you have any questions or concerns about the context, text or information contained within the body of this dictation, please contact myself, the provider, for further clarification      DEMARCO Moody  7/89/81487:50 PM

## 2023-02-20 NOTE — LETTER
February 20, 2023     Marck Dai, [address]  [address] [place] [address]  Jermain Redd   49  [phone]    Patient: Radha Bardales   YOB: 1933   Date of Visit: 2/20/2023       Dear Dr Carl Dowling:    Thank you for referring Sangita Villarreal to me for evaluation  Below are my notes for this consultation  If you have questions, please do not hesitate to call me  I look forward to following your patient along with you  Sincerely,        DEMARCO Mora        CC: No Recipients  Liss Freire, [address]  2/20/2023  2:46 PM  Incomplete  [address] [facility]  Cherie Bennett [address] [phone]  DISCHARGE SUMMARY  POS: [address]: C[address] Ahmadi    NAME: Radha Bardales  AGE: 80 y o  SEX: female  DATE OF ADMISSION: 12/29/2022   DATE OF DISCHARGE: 2/20/2023  DISCHARGE DISPOSITION: [facility]    Reason for admission: Patient was admitted from [facility] for rehabilitation after hospitalization for increased confusion and falls    Admission Diagnoses: As mentioned above   Additional Problems:   Discharge Diagnoses: See problem list follow up recommendations below  COVID-19  Pt was recently hospitalized with acute toxic metabolic encephalopathy attriuted with acute toxic metabolic encephalopathy attributed to COVID-19 viral pneumonia and sedating medications  Klonopin dose was reduced   Patient tested positive for COVID-19 on 1223  Chest x-ray was negative for acute cardiopulmonary disease  Patient remained asymptomatic, did not require any definitive treatment of supplemental oxygen  Remains on RA   SaO2 95% at rest RA  Pt discharging to [facility]    Ambulatory dysfunction  Multifactorial in the setting of acute and chronic med conditions  PT OT  Continue fall and safety precautions  Optimize acute and chronic med conditions  OOB for meals as tolerated     Anxiety  Mood stable   C/w Buspar 5 mg daily  Remains on Melatonin 3 mg QHS  Encourage oob for meals   Encourage participation in group activities in facility as appropriate    Frequent falls  Multifactorial in the setting of acute and chronic medical conditions   With known history of falls  PT OT  Fall and safety precautions  Continue supportive care  SW following for d/c planning  Discharging today to [facility]     Donnelly catheter in place  Placed during recent hospitalization  Removed in STR, with no difficulties  On exam voiding with no difficulties  Incontinent of B/B  Assist x1 staff     Insomnia  reports no difficulties  Sleep/Wake cycle, limit nighttime interruptions  C/w Melatonin 3 mg QHS    Hypertensive urgency  BP was elevated during recent hospitalization, SBP > 200 mmHg  BP trending 102s - 156s / 57s - 76s : bp this /70  continue to monitor Bps  C/w Amlodipine 5 mg daily     Meningioma ([address] )  Pt has known history  Recent CR head revealed unchanged finding of meningioma  F u with neurology service    Acute encephalopathy  Pt recently hospitalized for altered mental status   Found to be COVID +  CT of head showed meningioma that was unchanged from prior study  At the time of my evaluation pt is alert and oriented x3  [facility] at [facility]     Arthritis  With known history of chronic joints pain for arthritis  PT OT   Fall and safety precautions  C/w Tramadol 25 mg Q 8 hrs   Remains on Acetaminophen 650 mg every 6 hours PRN    Urinary retention  During recent hospitalization, discharge with donnelly catheter for urinary retention  Voiding trial failed  Bladder scan >450, Donnelly reinserted 1/12/23  Donnelly catheter was removed on 1/12/23  Incontinent of B/B   Today on exam voiding ok with no difficulties  If pt with new urinary difficulties recommend consult urology      Stage 3b chronic kidney disease [facility])  Lab Results   Component Value Date    EGFR 62 12/29/2022    EGFR 51 12/28/2022    EGFR 55 12/25/2022    CREATININE 0 83 12/29/2022    CREATININE 0 97 12/28/2022    CREATININE 0 92 12/25/2022 1/16/23- BUN 22, creatinine 0 86, eGFR 65  Avoid nephrotoxins and hypotension   Renal dose medications for GFR <30  continue adequate oral hydration   Monitor renal function periodically    Hyperthyroidism  Recent TSH and free T4 were normal limits as well  Patient is currently not on any therapy for above  F/u with PCP for management    Abrasion  Pt was evaluated by [facility] Wound NP for R buttock abrasion   Continue daily and PRN wound car with Z guard for pressure ulcer prevention and moisture management   Continue to offload  Monitor     Course of stay: Patient was admitted to [facility]  for rehabilitation following hospitalization  She was hospitalized on 12/23/2022 for altered mental status falls at home  Pt tested positive for COVID-19  Of note patient's son was hospitalized with COVID-19 infection  CT head revealed unchanged findings of meningioma  Chest x-ray was negative for acute cardiopulmonary disease  Patient remained asymptomatic with respect to COVID-19  Supportive care was provided  Patient did not require any supplemental oxygen  Altered mental status was attributed to metabolic encephalopathy likely due to COVID-19 viral pneumonia as well as sedating meds  Home dose of Klonopin was reduced  Patient was seen by PT OT services for frequent falls  She was subsequently discharged to [facility] rehab where she is being seen for posthospital admission  At the time of my evaluation patient is doing okay  Reports chronic arthritic pain, relief with acetaminophen  Denies /GI discomfort  Tolerating diet  Sleeping with no difficulties  Free of chest pain or palpitations  During the resident's stay at [facility], she received skilled nursing care, PT, OT, dietitian support, social service support and medical management  Pt is scheduled to be discharged to [address] [place] [address]       PT OT recommendations  Level Surfaces = CGA; Distance Level Surfaces = 30 feet; Assistive Device = Two-wheeled walker; Uneven Surfaces   = DNT  Stairs Stairs = CGA; Number of Stairs = 3 steps; Negotiates Stairs = Using handrails bilaterally  Original Signature: Electronically signed by [id number] [id number] 2/19/2023 01:47:11 P    Labs and testing performed during stay: reviewed from [facility]cc    Discharge Medications: See discharge medication list which was reviewed in [facility] and compared to facility orders for accuracy  Status at time of discharge exam: Stable    Today's Visit: 2/20/20232:45 PM    Subjective: No concerns or issues at this time    Review of systems: As per review of present illness all other systems reviewed and negative    Vitals: /70, P 80, R 18, Temp 97 4, O2 sats 95% RA, Weight 107 2 lb    Exam: Physical Exam  Constitutional:       Appearance: Normal appearance  She is not ill-appearing  Comments: Appears with generalized weakness and frailty   HENT:      Head: Normocephalic and atraumatic  Nose: Nose normal       Mouth/Throat:      Mouth: Mucous membranes are moist    Eyes:      General:         Right eye: No discharge  Left eye: No discharge  Cardiovascular:      Rate and Rhythm: Normal rate and regular rhythm  Pulses: Normal pulses  Heart sounds: Normal heart sounds  Pulmonary:      Effort: Pulmonary effort is normal  No respiratory distress  Breath sounds: Normal breath sounds  No wheezing  Abdominal:      General: Bowel sounds are normal  There is no distension  Palpations: Abdomen is soft  Tenderness: There is no abdominal tenderness  There is no guarding  Musculoskeletal:      Cervical back: Normal range of motion and neck supple  No rigidity or tenderness  Right lower leg: No edema  Left lower leg: No edema  Skin:     General: Skin is warm  Coloration: Skin is pale  Skin is not jaundiced  Findings: No bruising  Neurological:      General: No focal deficit present  Mental Status: She is alert  Cranial Nerves: No cranial nerve deficit  Motor: Weakness present  Gait: Gait abnormal    Psychiatric:         Mood and Affect: Mood normal      Discussion with patient/family and further instructions:  -Fall precautions  -Bleeding precautions  -Monitor for signs/symptoms of infection  -Medication list was reviewed    Follow-up Recommendations: Please follow-up with your primary care physician within 7-10 days of discharge to review medication changes and current status  Problem List Follow-up Recommendations:  F/U with PCP    I have spent >30 minutes with patient today in which greater than 50% of this time was spent in counseling/coordination of care regarding Diagnostic results, Prognosis, Risks and benefits of tx options, Instructions for management, Patient and family education, Importance of tx compliance, Risk factor reductions and Impressions  PCP made aware of discharge summary via epic communications  This note was completed in part utilizing [employer] direct voice recognition software  Grammatical errors, random word insertion, spelling mistakes, and incomplete sentences may be an occasional consequence of the system secondary to software limitations, ambient noise and hardware issues  At the time of dictation, efforts were made to edit, clarify and/or correct errors  Please read the chart carefully and recognize, using context, where substitutions have occurred  If you have any questions or concerns about the context, text or information contained within the body of this dictation, please contact myself, the provider, for further clarification      DEMARCO Brown  1/88/97406:01 PM

## 2023-02-20 NOTE — ASSESSMENT & PLAN NOTE
Pt was recently hospitalized with acute toxic metabolic encephalopathy attriuted with acute toxic metabolic encephalopathy attributed to COVID-19 viral pneumonia and sedating medications  Klonopin dose was reduced   Patient tested positive for COVID-19 on 1223  Chest x-ray was negative for acute cardiopulmonary disease  Patient remained asymptomatic, did not require any definitive treatment of supplemental oxygen  Remains on RA   SaO2 95% at rest RA  Pt discharging to [facility]

## 2023-02-20 NOTE — ASSESSMENT & PLAN NOTE
During recent hospitalization, discharge with donnelly catheter for urinary retention  Voiding trial failed  Bladder scan >450, Donnelly reinserted 1/12/23  Donnelly catheter was removed on 1/12/23  Incontinent of B/B   Today on exam voiding ok with no difficulties  If pt with new urinary difficulties recommend consult urology

## 2023-02-20 NOTE — ASSESSMENT & PLAN NOTE
Placed during recent hospitalization  Removed in STR, with no difficulties  On exam voiding with no difficulties  Incontinent of B/B  Assist x1 staff

## 2023-02-20 NOTE — ASSESSMENT & PLAN NOTE
Pt has known history  Recent CR head revealed unchanged finding of meningioma   F u with neurology service

## 2023-02-20 NOTE — ASSESSMENT & PLAN NOTE
Multifactorial in the setting of acute and chronic medical conditions   With known history of falls  PT OT  Fall and safety precautions  Continue supportive care  SW following for d/c planning  Discharging today to [facility]

## 2023-02-20 NOTE — ASSESSMENT & PLAN NOTE
Pt recently hospitalized for altered mental status   Found to be COVID +  CT of head showed meningioma that was unchanged from prior study  At the time of my evaluation pt is alert and oriented x3  Continue supportive care at [facility]

## 2023-02-20 NOTE — ASSESSMENT & PLAN NOTE
Recent TSH and free T4 were normal limits as well   Patient is currently not on any therapy for above  F/u with PCP for management

## 2023-11-11 NOTE — TELEPHONE ENCOUNTER
Patient recently aditted at [facility] for acute encephalopathy likely due to [facility]  Developed urinary retention while inpatient  She is now at [facility] for [employer]  Voiding trial was performed at facility on 1/13/23 and Alvarado had to be replaced  Patient not established with urology  Appt scheduled for 1/23/23 @ 11:30 AM [facility] office  Please confirm with nursing facility  Echo 2019 with EF 40% with inferior and basal to mid inferolateral wall hypokinesis.  - f/u TTE  - continue carvedilol 6.25 mg BID PO  - c/w home lisinopril 40 daily  - c/w home atorvastatin 80 mg daily   - Lipid panel   - TSH /w reflex

## 2024-02-19 ENCOUNTER — TELEPHONE (OUTPATIENT)
Dept: FAMILY MEDICINE CLINIC | Facility: CLINIC | Age: 89
End: 2024-02-19

## 2024-02-19 NOTE — TELEPHONE ENCOUNTER
Tried to call patient and leave message but MB full. Patient is currently due for AWV.Please advise.Thank you.

## 2024-10-07 ENCOUNTER — TELEPHONE (OUTPATIENT)
Dept: FAMILY MEDICINE CLINIC | Facility: CLINIC | Age: 89
End: 2024-10-07

## 2024-10-07 NOTE — TELEPHONE ENCOUNTER
Called patient to schedule AWV was not able to reach patient VM full and was not able to leave message. Please advise

## 2025-04-14 NOTE — TELEPHONE ENCOUNTER
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    Surgeon: Oswaldo Weiner MD    Circulator: Ella Lizarraga RN  Relief Circulator: Melvi Zhao RN  Relief Scrub: Deo Ureña  Scrub Person: Laura Betancourt  First Assistant: Lennie Jacobo RN     Anesthesia:  General    Estimated Blood Loss: 100 ml      INDICATIONS: Elsa is a 89-year-old male who is status post right below-knee amputation.  He was admitted Wednesday of last week.  At that point he had cellulitis of his foot.  He has a number of risk factors for failure for transmetatarsal amputation including diabetes, peripheral arterial disease, noncompliant with nonweightbearing.  Over the weekend he had considered his options for below-knee amputation and wished to proceed.  I resumed care from Dr. GONZALO POLLARD.  We discussed risk benefits alternatives.  Notable risks include bleeding, infection, dehiscence, failure to heal.    PROCEDURE:  A general anesthetic was administered.  His foot was excluded from the surgical field.  We began by making a fishmouth type incision 3 inches below the tibial tuberosity.  We dissected down to the bone.  We elevated the periosteum.  We then dissected through the anterior compartments using cautery and suture ligature for hemostasis.  We then dissected behind the tibia.  We were able to elevate the periosteum up to the planned transection site of the bone.  We then dissected laterally and fully expose the fibula.  We then used a oscillating saw to bevel and divide the tibia.  We again used oscillating saw to divide the fibula more proximally.  We then were able to clamp and tie the arterial venous and nerve structures posterior to the knee.  We then completed through our posterior compartment with cautery and used clamps and ties as necessary for  clonazepam sent but not due for tramadol until 7/13-when is pt actually out hemostasis.    Once we are satisfied with hemostasis we used 0 Vicryl suture and #1 Vicryl suture to close the posterior muscle belly.  This was closed up to the anterior fascia.  We then used 2-0 Vicryl for subcutaneous tissues followed by 3-0 Vicryl for deep dermal tissues.  We then used 2-0 nylon in a mattress fashion as well as scattered interrupted sutures for skin closure.  The skin flap appeared well-perfused.  There is no evidence of any ongoing bleeding.  Xeroform gauze fluffs ABDs Curlex and an Ace bandage were used for postoperative dressing.  He appeared to favor a flexed knee position and a knee immobilizer will be fitted.    Oswaldo Weiner MD on 4/14/2025 at 1:09 PM